# Patient Record
Sex: FEMALE | Race: BLACK OR AFRICAN AMERICAN | Employment: UNEMPLOYED | URBAN - METROPOLITAN AREA
[De-identification: names, ages, dates, MRNs, and addresses within clinical notes are randomized per-mention and may not be internally consistent; named-entity substitution may affect disease eponyms.]

---

## 2019-10-14 ENCOUNTER — HOSPITAL ENCOUNTER (EMERGENCY)
Age: 59
Discharge: HOME | End: 2019-10-14
Payer: COMMERCIAL

## 2019-10-14 VITALS
OXYGEN SATURATION: 98 % | BODY MASS INDEX: 22.18 KG/M2 | WEIGHT: 138 LBS | HEART RATE: 93 BPM | HEIGHT: 66 IN | RESPIRATION RATE: 16 BRPM | DIASTOLIC BLOOD PRESSURE: 88 MMHG | SYSTOLIC BLOOD PRESSURE: 122 MMHG | TEMPERATURE: 98.2 F

## 2019-10-14 DIAGNOSIS — N30.00 ACUTE CYSTITIS WITHOUT HEMATURIA: Primary | ICD-10-CM

## 2019-10-14 LAB
BACTERIA: ABNORMAL /HPF
BILIRUBIN URINE: NEGATIVE
BLOOD, URINE: NEGATIVE
CLARITY: CLEAR
COLOR: YELLOW
EPITHELIAL CELLS, UA: ABNORMAL /HPF
GLUCOSE URINE: NEGATIVE MG/DL
KETONES, URINE: NEGATIVE MG/DL
LEUKOCYTE ESTERASE, URINE: ABNORMAL
NITRITE, URINE: NEGATIVE
PH UA: 6.5 (ref 5–9)
PROTEIN UA: NEGATIVE MG/DL
RBC UA: ABNORMAL /HPF (ref 0–2)
SPECIFIC GRAVITY UA: <=1.005 (ref 1–1.03)
UROBILINOGEN, URINE: 0.2 E.U./DL
WBC UA: ABNORMAL /HPF (ref 0–5)

## 2019-10-14 PROCEDURE — 99283 EMERGENCY DEPT VISIT LOW MDM: CPT

## 2019-10-14 PROCEDURE — 81001 URINALYSIS AUTO W/SCOPE: CPT

## 2019-10-14 RX ORDER — NITROFURANTOIN 25; 75 MG/1; MG/1
100 CAPSULE ORAL 2 TIMES DAILY
Qty: 14 CAPSULE | Refills: 0 | Status: SHIPPED | OUTPATIENT
Start: 2019-10-14 | End: 2019-10-21

## 2023-05-09 ENCOUNTER — APPOINTMENT (OUTPATIENT)
Dept: LAB | Facility: LAB | Age: 63
End: 2023-05-09
Payer: COMMERCIAL

## 2023-05-09 LAB
ACTIVATED PARTIAL THROMBOPLASTIN TIME IN PPP BY COAGULATION ASSAY: 34 SEC (ref 26–39)
ALANINE AMINOTRANSFERASE (SGPT) (U/L) IN SER/PLAS: 39 U/L (ref 7–45)
ALBUMIN (G/DL) IN SER/PLAS: 3.2 G/DL (ref 3.4–5)
ALKALINE PHOSPHATASE (U/L) IN SER/PLAS: 183 U/L (ref 33–136)
ALPHA-1 FETOPROTEIN (NG/ML) IN SER/PLAS: 5 NG/ML (ref 0–9)
AMMONIA (UMOL/L) IN PLASMA: 28 UMOL/L
ANION GAP IN SER/PLAS: 10 MMOL/L (ref 10–20)
ASPARTATE AMINOTRANSFERASE (SGOT) (U/L) IN SER/PLAS: 60 U/L (ref 9–39)
BASOPHILS (10*3/UL) IN BLOOD BY AUTOMATED COUNT: 0.07 X10E9/L (ref 0–0.1)
BASOPHILS/100 LEUKOCYTES IN BLOOD BY AUTOMATED COUNT: 1.5 % (ref 0–2)
BILIRUBIN TOTAL (MG/DL) IN SER/PLAS: 2.1 MG/DL (ref 0–1.2)
CALCIUM (MG/DL) IN SER/PLAS: 8.6 MG/DL (ref 8.6–10.6)
CARBON DIOXIDE, TOTAL (MMOL/L) IN SER/PLAS: 27 MMOL/L (ref 21–32)
CHLORIDE (MMOL/L) IN SER/PLAS: 106 MMOL/L (ref 98–107)
CHOLESTEROL (MG/DL) IN SER/PLAS: 234 MG/DL (ref 0–199)
CHOLESTEROL IN HDL (MG/DL) IN SER/PLAS: 48.2 MG/DL
CHOLESTEROL/HDL RATIO: 4.9
CREATININE (MG/DL) IN SER/PLAS: 0.7 MG/DL (ref 0.5–1.05)
EOSINOPHILS (10*3/UL) IN BLOOD BY AUTOMATED COUNT: 0.12 X10E9/L (ref 0–0.7)
EOSINOPHILS/100 LEUKOCYTES IN BLOOD BY AUTOMATED COUNT: 2.5 % (ref 0–6)
ERYTHROCYTE DISTRIBUTION WIDTH (RATIO) BY AUTOMATED COUNT: 17.1 % (ref 11.5–14.5)
ERYTHROCYTE MEAN CORPUSCULAR HEMOGLOBIN CONCENTRATION (G/DL) BY AUTOMATED: 30.9 G/DL (ref 32–36)
ERYTHROCYTE MEAN CORPUSCULAR VOLUME (FL) BY AUTOMATED COUNT: 88 FL (ref 80–100)
ERYTHROCYTES (10*6/UL) IN BLOOD BY AUTOMATED COUNT: 4.09 X10E12/L (ref 4–5.2)
ESTIMATED AVERAGE GLUCOSE FOR HBA1C: 103 MG/DL
GFR FEMALE: >90 ML/MIN/1.73M2
GLUCOSE (MG/DL) IN SER/PLAS: 93 MG/DL (ref 74–99)
HEMATOCRIT (%) IN BLOOD BY AUTOMATED COUNT: 35.9 % (ref 36–46)
HEMOGLOBIN (G/DL) IN BLOOD: 11.1 G/DL (ref 12–16)
HEMOGLOBIN A1C/HEMOGLOBIN TOTAL IN BLOOD: 5.2 %
IMMATURE GRANULOCYTES/100 LEUKOCYTES IN BLOOD BY AUTOMATED COUNT: 0.2 % (ref 0–0.9)
INR IN PPP BY COAGULATION ASSAY: 1.2 (ref 0.9–1.1)
LDL: 161 MG/DL (ref 0–99)
LEUKOCYTES (10*3/UL) IN BLOOD BY AUTOMATED COUNT: 4.8 X10E9/L (ref 4.4–11.3)
LYMPHOCYTES (10*3/UL) IN BLOOD BY AUTOMATED COUNT: 1.43 X10E9/L (ref 1.2–4.8)
LYMPHOCYTES/100 LEUKOCYTES IN BLOOD BY AUTOMATED COUNT: 30.1 % (ref 13–44)
MONOCYTES (10*3/UL) IN BLOOD BY AUTOMATED COUNT: 0.9 X10E9/L (ref 0.1–1)
MONOCYTES/100 LEUKOCYTES IN BLOOD BY AUTOMATED COUNT: 18.9 % (ref 2–10)
NEUTROPHILS (10*3/UL) IN BLOOD BY AUTOMATED COUNT: 2.22 X10E9/L (ref 1.2–7.7)
NEUTROPHILS/100 LEUKOCYTES IN BLOOD BY AUTOMATED COUNT: 46.8 % (ref 40–80)
NRBC (PER 100 WBCS) BY AUTOMATED COUNT: 0 /100 WBC (ref 0–0)
PLATELETS (10*3/UL) IN BLOOD AUTOMATED COUNT: 90 X10E9/L (ref 150–450)
POTASSIUM (MMOL/L) IN SER/PLAS: 4 MMOL/L (ref 3.5–5.3)
PROTEIN TOTAL: 6.9 G/DL (ref 6.4–8.2)
PROTHROMBIN TIME (PT) IN PPP BY COAGULATION ASSAY: 14.4 SEC (ref 9.8–13.4)
SODIUM (MMOL/L) IN SER/PLAS: 139 MMOL/L (ref 136–145)
TRIGLYCERIDE (MG/DL) IN SER/PLAS: 122 MG/DL (ref 0–149)
UREA NITROGEN (MG/DL) IN SER/PLAS: 8 MG/DL (ref 6–23)
VLDL: 24 MG/DL (ref 0–40)

## 2023-05-10 LAB
HCV PCR QUANT: NOT DETECTED IU/ML
HCV RNA, PCR LOG: NORMAL LOG10 IU/ML

## 2023-06-07 LAB
ALANINE AMINOTRANSFERASE (SGPT) (U/L) IN SER/PLAS: 26 U/L (ref 7–45)
ALBUMIN (G/DL) IN SER/PLAS: 2.8 G/DL (ref 3.4–5)
ALKALINE PHOSPHATASE (U/L) IN SER/PLAS: 168 U/L (ref 33–136)
ALPHA 1 ANTITRYPSIN (MG/DL) IN SER/PLAS: 152 MG/DL (ref 84–218)
ANION GAP IN SER/PLAS: 10 MMOL/L (ref 10–20)
ASPARTATE AMINOTRANSFERASE (SGOT) (U/L) IN SER/PLAS: 51 U/L (ref 9–39)
BASOPHILS (10*3/UL) IN BLOOD BY AUTOMATED COUNT: 0.07 X10E9/L (ref 0–0.1)
BASOPHILS/100 LEUKOCYTES IN BLOOD BY AUTOMATED COUNT: 1.3 % (ref 0–2)
BILIRUBIN TOTAL (MG/DL) IN SER/PLAS: 2.1 MG/DL (ref 0–1.2)
CALCIUM (MG/DL) IN SER/PLAS: 8.1 MG/DL (ref 8.6–10.6)
CARBON DIOXIDE, TOTAL (MMOL/L) IN SER/PLAS: 25 MMOL/L (ref 21–32)
CERULOPLASMIN (MG/DL) IN SER/PLAS: 32 MG/DL (ref 20–60)
CHLORIDE (MMOL/L) IN SER/PLAS: 108 MMOL/L (ref 98–107)
CHOLESTEROL (MG/DL) IN SER/PLAS: 202 MG/DL (ref 0–199)
CHOLESTEROL IN HDL (MG/DL) IN SER/PLAS: 38.9 MG/DL
CHOLESTEROL/HDL RATIO: 5.2
CREATININE (MG/DL) IN SER/PLAS: 0.7 MG/DL (ref 0.5–1.05)
EOSINOPHILS (10*3/UL) IN BLOOD BY AUTOMATED COUNT: 0.12 X10E9/L (ref 0–0.7)
EOSINOPHILS/100 LEUKOCYTES IN BLOOD BY AUTOMATED COUNT: 2.1 % (ref 0–6)
ERYTHROCYTE DISTRIBUTION WIDTH (RATIO) BY AUTOMATED COUNT: 16.5 % (ref 11.5–14.5)
ERYTHROCYTE MEAN CORPUSCULAR HEMOGLOBIN CONCENTRATION (G/DL) BY AUTOMATED: 32.8 G/DL (ref 32–36)
ERYTHROCYTE MEAN CORPUSCULAR VOLUME (FL) BY AUTOMATED COUNT: 84 FL (ref 80–100)
ERYTHROCYTES (10*6/UL) IN BLOOD BY AUTOMATED COUNT: 3.48 X10E12/L (ref 4–5.2)
FERRITIN (UG/LL) IN SER/PLAS: 164 UG/L (ref 8–150)
GFR FEMALE: >90 ML/MIN/1.73M2
GLUCOSE (MG/DL) IN SER/PLAS: 102 MG/DL (ref 74–99)
HEMATOCRIT (%) IN BLOOD BY AUTOMATED COUNT: 29.3 % (ref 36–46)
HEMOGLOBIN (G/DL) IN BLOOD: 9.6 G/DL (ref 12–16)
HEPATITIS B VIRUS SURFACE AB (MIU/ML) IN SERUM: 5.9 MIU/ML
HEPATITIS B VIRUS SURFACE AG PRESENCE IN SERUM: NONREACTIVE
IMMATURE GRANULOCYTES/100 LEUKOCYTES IN BLOOD BY AUTOMATED COUNT: 0.2 % (ref 0–0.9)
IRON (UG/DL) IN SER/PLAS: 84 UG/DL (ref 35–150)
IRON BINDING CAPACITY (UG/DL) IN SER/PLAS: 323 UG/DL (ref 240–445)
IRON SATURATION (%) IN SER/PLAS: 26 % (ref 25–45)
LDL: 134 MG/DL (ref 0–99)
LEUKOCYTES (10*3/UL) IN BLOOD BY AUTOMATED COUNT: 5.6 X10E9/L (ref 4.4–11.3)
LYMPHOCYTES (10*3/UL) IN BLOOD BY AUTOMATED COUNT: 1.57 X10E9/L (ref 1.2–4.8)
LYMPHOCYTES/100 LEUKOCYTES IN BLOOD BY AUTOMATED COUNT: 28.1 % (ref 13–44)
MONOCYTES (10*3/UL) IN BLOOD BY AUTOMATED COUNT: 1.37 X10E9/L (ref 0.1–1)
MONOCYTES/100 LEUKOCYTES IN BLOOD BY AUTOMATED COUNT: 24.5 % (ref 2–10)
NEUTROPHILS (10*3/UL) IN BLOOD BY AUTOMATED COUNT: 2.45 X10E9/L (ref 1.2–7.7)
NEUTROPHILS/100 LEUKOCYTES IN BLOOD BY AUTOMATED COUNT: 43.8 % (ref 40–80)
NRBC (PER 100 WBCS) BY AUTOMATED COUNT: 0 /100 WBC (ref 0–0)
PLATELETS (10*3/UL) IN BLOOD AUTOMATED COUNT: 80 X10E9/L (ref 150–450)
POTASSIUM (MMOL/L) IN SER/PLAS: 3.5 MMOL/L (ref 3.5–5.3)
PROTEIN TOTAL: 6.1 G/DL (ref 6.4–8.2)
SODIUM (MMOL/L) IN SER/PLAS: 139 MMOL/L (ref 136–145)
TRIGLYCERIDE (MG/DL) IN SER/PLAS: 148 MG/DL (ref 0–149)
UREA NITROGEN (MG/DL) IN SER/PLAS: 8 MG/DL (ref 6–23)
VLDL: 30 MG/DL (ref 0–40)

## 2023-06-08 LAB
ANA PATTERN: ABNORMAL
ANA TITER: ABNORMAL
ANTI-CENTROMERE: <0.2 AI
ANTI-CHROMATIN: <0.2 AI
ANTI-DNA (DS): <1 IU/ML
ANTI-JO-1 IGG: <0.2 AI
ANTI-NUCLEAR ANTIBODY (ANA): POSITIVE
ANTI-RIBOSOMAL P: <0.2 AI
ANTI-RNP: 0.2 AI
ANTI-SCL-70: <0.2 AI
ANTI-SM/RNP: <0.2 AI
ANTI-SM: <0.2 AI
ANTI-SMOOTH MUSCLE ANTIBODY: NEGATIVE
ANTI-SSA: <0.2 AI
ANTI-SSB: <0.2 AI
MITOCHONDRIAL ANTIBODY: NEGATIVE

## 2023-06-14 ENCOUNTER — HOSPITAL ENCOUNTER (OUTPATIENT)
Dept: DATA CONVERSION | Facility: HOSPITAL | Age: 63
End: 2023-06-14
Attending: RADIOLOGY | Admitting: RADIOLOGY
Payer: COMMERCIAL

## 2023-06-14 DIAGNOSIS — K70.31 ALCOHOLIC CIRRHOSIS OF LIVER WITH ASCITES (MULTI): ICD-10-CM

## 2023-06-14 DIAGNOSIS — K70.30 ALCOHOLIC CIRRHOSIS OF LIVER WITHOUT ASCITES (MULTI): ICD-10-CM

## 2023-06-14 DIAGNOSIS — R18.8 OTHER ASCITES: ICD-10-CM

## 2023-06-14 DIAGNOSIS — R14.0 ABDOMINAL DISTENSION (GASEOUS): ICD-10-CM

## 2023-06-14 DIAGNOSIS — R10.9 UNSPECIFIED ABDOMINAL PAIN: ICD-10-CM

## 2023-06-14 DIAGNOSIS — E78.5 HYPERLIPIDEMIA, UNSPECIFIED: ICD-10-CM

## 2023-06-15 ENCOUNTER — PATIENT OUTREACH (OUTPATIENT)
Dept: CARE COORDINATION | Facility: CLINIC | Age: 63
End: 2023-06-15
Payer: COMMERCIAL

## 2023-06-15 ENCOUNTER — DOCUMENTATION (OUTPATIENT)
Dept: CARE COORDINATION | Facility: CLINIC | Age: 63
End: 2023-06-15
Payer: COMMERCIAL

## 2023-06-15 NOTE — PROGRESS NOTES
" attempted to call patient for initial outreach. SW introduced self and patient responded \"I don't feel like talking.\" SW asked patient if she would be willing to talk for a few minutes and she again said \"I don't feel like talking.\" SW asked if there would be a better time to call back. Patient then hung up and ended the call.    "

## 2023-06-15 NOTE — PROGRESS NOTES
Referral received for care management services. Will outreach to patient to assess needs and provide support.

## 2023-06-16 NOTE — PROGRESS NOTES
"SW attempted to call patient's daughters Eli Moreno and Milana Foster. Purpose of outreach attempts to these family members was to assess any safety concerns they have for patient, as SW was unable to engage patient on the phone. SW was unable to reach Eli due to the number in the chart being incorrect. Left voicemail for Milana requesting callback.     MATHEW contacted [facility] team to refer patient for outreach and possibly safety check. Spoke with  Malia. She obtained patient's information and stated the Mobile Crisis team would reach out to patient on the phone. She also said that if patient refused to talk or if she did not answer, the team would complete a safety check.    While MATHEW was on the phone with [facility], patient's daughter Milana called back. Milana told MATHEW that patient is \"doing good.\" Milana said she did not have any concerns about patient or her safety. Milana confirmed that patient lives with her, so she has seen and talked to patient throughout the day since her procedure on Wednesday. MATHEW provided Milana with the phone number for Mobile [facility] and advised her to call if patient begins to have new or worsening depression symptoms or thoughts of harming herself or others.    SW called [facility] back and provided update about conversation with patient's daughter Milana. They updated patient's chart and said they planned to still complete an outreach call at the very least.   "

## 2023-09-07 ENCOUNTER — HOSPITAL ENCOUNTER (OUTPATIENT)
Dept: DATA CONVERSION | Facility: HOSPITAL | Age: 63
End: 2023-09-07
Attending: NURSE PRACTITIONER
Payer: COMMERCIAL

## 2023-09-07 VITALS — BODY MASS INDEX: 26.68 KG/M2 | HEIGHT: 66 IN | WEIGHT: 166.01 LBS

## 2023-09-07 DIAGNOSIS — K70.30 ALCOHOLIC CIRRHOSIS OF LIVER WITHOUT ASCITES (MULTI): ICD-10-CM

## 2023-09-07 DIAGNOSIS — R18.8 OTHER ASCITES: ICD-10-CM

## 2023-09-07 DIAGNOSIS — K70.31 ALCOHOLIC CIRRHOSIS OF LIVER WITH ASCITES (MULTI): ICD-10-CM

## 2023-09-28 ENCOUNTER — HOSPITAL ENCOUNTER (OUTPATIENT)
Dept: DATA CONVERSION | Facility: HOSPITAL | Age: 63
End: 2023-09-28
Attending: NURSE PRACTITIONER | Admitting: NURSE PRACTITIONER
Payer: COMMERCIAL

## 2023-09-28 DIAGNOSIS — K70.30 ALCOHOLIC CIRRHOSIS OF LIVER WITHOUT ASCITES (MULTI): ICD-10-CM

## 2023-09-28 DIAGNOSIS — K70.31 ALCOHOLIC CIRRHOSIS OF LIVER WITH ASCITES (MULTI): ICD-10-CM

## 2023-09-28 DIAGNOSIS — F10.90 ALCOHOL USE, UNSPECIFIED, UNCOMPLICATED: ICD-10-CM

## 2023-10-05 PROBLEM — F32.0 DEPRESSION, MAJOR, SINGLE EPISODE, MILD (CMS-HCC): Status: ACTIVE | Noted: 2023-10-05

## 2023-10-05 PROBLEM — R74.01 TRANSAMINITIS: Status: ACTIVE | Noted: 2023-10-05

## 2023-10-05 PROBLEM — K70.30 ALCOHOLIC CIRRHOSIS (MULTI): Status: ACTIVE | Noted: 2023-10-05

## 2023-10-05 PROBLEM — R06.02 SHORTNESS OF BREATH ON EXERTION: Status: ACTIVE | Noted: 2023-10-05

## 2023-10-05 PROBLEM — R18.8 ABDOMINAL ASCITES: Status: ACTIVE | Noted: 2023-10-05

## 2023-10-05 PROBLEM — G89.29 CHRONIC PAIN OF RIGHT ANKLE: Status: ACTIVE | Noted: 2023-10-05

## 2023-10-05 PROBLEM — F14.90 COCAINE USE: Status: ACTIVE | Noted: 2023-10-05

## 2023-10-05 PROBLEM — R78.81 POSITIVE BLOOD CULTURES: Status: ACTIVE | Noted: 2023-10-05

## 2023-10-05 PROBLEM — R00.0 TACHYCARDIA: Status: ACTIVE | Noted: 2023-10-05

## 2023-10-05 PROBLEM — I85.00 ESOPHAGEAL VARICES (MULTI): Status: ACTIVE | Noted: 2023-10-05

## 2023-10-05 PROBLEM — R78.81 BACTEREMIA: Status: ACTIVE | Noted: 2023-10-05

## 2023-10-05 PROBLEM — B18.2 CHRONIC HEPATITIS C WITH CIRRHOSIS (MULTI): Status: ACTIVE | Noted: 2023-10-05

## 2023-10-05 PROBLEM — K74.60 CHRONIC HEPATITIS C WITH CIRRHOSIS (MULTI): Status: ACTIVE | Noted: 2023-10-05

## 2023-10-05 PROBLEM — N13.30 HYDRONEPHROSIS, LEFT: Status: ACTIVE | Noted: 2023-10-05

## 2023-10-05 PROBLEM — R60.0 LEG EDEMA: Status: ACTIVE | Noted: 2023-10-05

## 2023-10-05 PROBLEM — F31.81: Status: ACTIVE | Noted: 2023-10-05

## 2023-10-05 PROBLEM — L30.9 ECZEMA: Status: ACTIVE | Noted: 2023-10-05

## 2023-10-05 PROBLEM — K70.9 ALCOHOLIC LIVER DISEASE (MULTI): Status: ACTIVE | Noted: 2023-10-05

## 2023-10-05 PROBLEM — G31.84 MILD COGNITIVE IMPAIRMENT: Status: ACTIVE | Noted: 2023-10-05

## 2023-10-05 PROBLEM — M25.571 CHRONIC PAIN OF RIGHT ANKLE: Status: ACTIVE | Noted: 2023-10-05

## 2023-10-05 PROBLEM — H61.23 BILATERAL IMPACTED CERUMEN: Status: ACTIVE | Noted: 2023-10-05

## 2023-10-05 PROBLEM — E78.5 DYSLIPIDEMIA: Status: ACTIVE | Noted: 2023-10-05

## 2023-10-05 PROBLEM — F33.0 MILD EPISODE OF RECURRENT MAJOR DEPRESSIVE DISORDER (CMS-HCC): Status: ACTIVE | Noted: 2023-10-05

## 2023-10-05 PROBLEM — F10.10 ALCOHOL ABUSE: Status: ACTIVE | Noted: 2023-10-05

## 2023-10-05 PROBLEM — F10.929: Status: ACTIVE | Noted: 2023-10-05

## 2023-10-05 PROBLEM — F22 PARANOIA (MULTI): Status: ACTIVE | Noted: 2023-10-05

## 2023-10-05 RX ORDER — ATORVASTATIN CALCIUM 20 MG/1
1 TABLET, FILM COATED ORAL NIGHTLY
COMMUNITY
Start: 2023-06-07 | End: 2024-03-07 | Stop reason: SDUPTHER

## 2023-10-05 RX ORDER — HYDROCORTISONE 1 G/100G
CREAM TOPICAL 3 TIMES DAILY
Status: ON HOLD | COMMUNITY
Start: 2022-06-07 | End: 2023-12-26 | Stop reason: ALTCHOICE

## 2023-10-05 RX ORDER — GUAIFENESIN 1200 MG
650 TABLET, EXTENDED RELEASE 12 HR ORAL 3 TIMES DAILY PRN
COMMUNITY
Start: 2022-06-06 | End: 2024-03-07

## 2023-10-05 RX ORDER — CARVEDILOL 6.25 MG/1
6.25 TABLET ORAL 2 TIMES DAILY
COMMUNITY
Start: 2023-09-08 | End: 2024-03-07 | Stop reason: SDUPTHER

## 2023-10-05 RX ORDER — CARVEDILOL 3.12 MG/1
1 TABLET ORAL 2 TIMES DAILY
COMMUNITY
Start: 2022-08-12 | End: 2023-12-23 | Stop reason: ALTCHOICE

## 2023-10-05 RX ORDER — ACETAMINOPHEN 500 MG/1
500 CAPSULE, LIQUID FILLED ORAL 3 TIMES DAILY PRN
COMMUNITY
Start: 2022-06-06 | End: 2023-12-23 | Stop reason: ALTCHOICE

## 2023-10-05 RX ORDER — FOLIC ACID 1 MG/1
1 TABLET ORAL DAILY
COMMUNITY
Start: 2023-08-30 | End: 2024-03-07 | Stop reason: SDUPTHER

## 2023-10-05 RX ORDER — FUROSEMIDE 40 MG/1
40 TABLET ORAL DAILY
COMMUNITY
Start: 2022-06-06 | End: 2024-03-07 | Stop reason: SDUPTHER

## 2023-10-05 RX ORDER — LANOLIN ALCOHOL/MO/W.PET/CERES
100 CREAM (GRAM) TOPICAL DAILY
COMMUNITY
Start: 2023-08-30 | End: 2024-03-07 | Stop reason: SDUPTHER

## 2023-10-05 RX ORDER — ASPIRIN 81 MG
TABLET, DELAYED RELEASE (ENTERIC COATED) ORAL
COMMUNITY
Start: 2022-11-14 | End: 2023-12-23 | Stop reason: ALTCHOICE

## 2023-10-05 RX ORDER — ELECTROLYTES/DEXTROSE
SOLUTION, ORAL ORAL
Status: ON HOLD | COMMUNITY
Start: 2023-03-07 | End: 2023-12-26 | Stop reason: ALTCHOICE

## 2023-10-05 RX ORDER — ARIPIPRAZOLE 5 MG/1
1 TABLET ORAL DAILY
COMMUNITY
Start: 2022-05-26 | End: 2024-03-07 | Stop reason: SDUPTHER

## 2023-10-05 RX ORDER — POLYETHYLENE GLYCOL 3350 17 G/17G
17 POWDER, FOR SOLUTION ORAL DAILY PRN
Status: ON HOLD | COMMUNITY
Start: 2023-07-05 | End: 2023-12-26 | Stop reason: ALTCHOICE

## 2023-10-05 RX ORDER — NAPROXEN 500 MG/1
500 TABLET ORAL EVERY 12 HOURS
COMMUNITY
Start: 2023-09-28 | End: 2024-03-07

## 2023-10-05 RX ORDER — SPIRONOLACTONE 50 MG/1
1 TABLET, FILM COATED ORAL DAILY
COMMUNITY
Start: 2022-06-06 | End: 2024-03-07 | Stop reason: SDUPTHER

## 2023-10-06 ENCOUNTER — TELEPHONE (OUTPATIENT)
Dept: PRIMARY CARE | Facility: HOSPITAL | Age: 63
End: 2023-10-06
Payer: COMMERCIAL

## 2023-10-06 ENCOUNTER — APPOINTMENT (OUTPATIENT)
Dept: GYNECOLOGIC ONCOLOGY | Facility: HOSPITAL | Age: 63
End: 2023-10-06
Payer: COMMERCIAL

## 2023-10-19 ENCOUNTER — HOSPITAL ENCOUNTER (OUTPATIENT)
Dept: RADIOLOGY | Facility: HOSPITAL | Age: 63
Discharge: HOME | End: 2023-10-19
Payer: COMMERCIAL

## 2023-10-19 VITALS
TEMPERATURE: 97.2 F | DIASTOLIC BLOOD PRESSURE: 70 MMHG | SYSTOLIC BLOOD PRESSURE: 111 MMHG | RESPIRATION RATE: 16 BRPM | OXYGEN SATURATION: 89 % | HEART RATE: 88 BPM

## 2023-10-19 DIAGNOSIS — K70.30 ALCOHOLIC CIRRHOSIS OF LIVER WITHOUT ASCITES (MULTI): ICD-10-CM

## 2023-10-19 DIAGNOSIS — R18.8 OTHER ASCITES: ICD-10-CM

## 2023-10-19 DIAGNOSIS — K70.31 ASCITES DUE TO ALCOHOLIC CIRRHOSIS (MULTI): Primary | ICD-10-CM

## 2023-10-19 PROCEDURE — 49083 ABD PARACENTESIS W/IMAGING: CPT | Performed by: NURSE PRACTITIONER

## 2023-10-19 PROCEDURE — 2500000004 HC RX 250 GENERAL PHARMACY W/ HCPCS (ALT 636 FOR OP/ED): Mod: JZ,SE | Performed by: NURSE PRACTITIONER

## 2023-10-19 PROCEDURE — P9045 ALBUMIN (HUMAN), 5%, 250 ML: HCPCS | Mod: JZ,SE | Performed by: NURSE PRACTITIONER

## 2023-10-19 PROCEDURE — 49083 ABD PARACENTESIS W/IMAGING: CPT

## 2023-10-19 RX ORDER — ALBUMIN HUMAN 50 G/1000ML
50 SOLUTION INTRAVENOUS ONCE
Status: COMPLETED | OUTPATIENT
Start: 2023-10-19 | End: 2023-10-19

## 2023-10-19 RX ADMIN — ALBUMIN (HUMAN) 50 G: 12.5 SOLUTION INTRAVENOUS at 10:30

## 2023-10-19 ASSESSMENT — PAIN - FUNCTIONAL ASSESSMENT
PAIN_FUNCTIONAL_ASSESSMENT: 0-10

## 2023-10-19 ASSESSMENT — ENCOUNTER SYMPTOMS
ABDOMINAL DISTENTION: 1
MUSCULOSKELETAL NEGATIVE: 1
CONSTITUTIONAL NEGATIVE: 1
PSYCHIATRIC NEGATIVE: 1
NEUROLOGICAL NEGATIVE: 1
CARDIOVASCULAR NEGATIVE: 1
ABDOMINAL PAIN: 1
RESPIRATORY NEGATIVE: 1

## 2023-10-19 ASSESSMENT — PAIN SCALES - GENERAL
PAINLEVEL_OUTOF10: 0 - NO PAIN

## 2023-10-19 NOTE — POST-PROCEDURE NOTE
A time out was performed and Left Hemiabdomen was examined with US and appropriate entry point was confirmed and marked.  The patient was prepped and draped in a sterile manner, 1% lidocaine was used to anesthesize the skin and subcutaneous tissue. A 5F Centesis needle was then introduced through the skin into the peritoneal space, the centesis catheter was then threaded without difficulty. 6800 ml of yellow fluid was removed without difficulty. The catheter was then removed. No immediate complications were noted during and immediately following the procedure.

## 2023-10-19 NOTE — H&P (VIEW-ONLY)
History Of Present Illness  Yolie Moreno is a 63 y.o. female presenting with ascites.     Past Medical History  Past Medical History:   Diagnosis Date    Encounter for gynecological examination (general) (routine) without abnormal findings 07/20/2022    Well woman exam    Trichomonal vulvovaginitis 07/30/2022    Trichomonas vaginitis       Surgical History  Past Surgical History:   Procedure Laterality Date    CHOLECYSTECTOMY  11/12/2014    Cholecystectomy    US GUIDED ABDOMINAL PARACENTESIS  6/14/2023    US GUIDED ABDOMINAL PARACENTESIS 6/14/2023 [facility]    US GUIDED ABDOMINAL PARACENTESIS  7/3/2023    US GUIDED ABDOMINAL PARACENTESIS 7/3/2023 [facility]    US GUIDED ABDOMINAL PARACENTESIS  9/7/2023    US GUIDED ABDOMINAL PARACENTESIS 9/7/2023 [facility]    US GUIDED ABDOMINAL PARACENTESIS  9/28/2023    US GUIDED ABDOMINAL PARACENTESIS 9/28/2023 [facility]        Social History  She has no history on file for tobacco use, alcohol use, and drug use.    Family History  No family history on file.     Allergies  Patient has no known allergies.    Review of Systems   Constitutional: Negative.    HENT: Negative.     Respiratory: Negative.     Cardiovascular: Negative.    Gastrointestinal:  Positive for abdominal distention and abdominal pain.   Genitourinary: Negative.    Musculoskeletal: Negative.    Skin: Negative.    Neurological: Negative.    Psychiatric/Behavioral: Negative.          Physical Exam  Constitutional:       Appearance: Normal appearance. She is normal weight.   Eyes:      Pupils: Pupils are equal, round, and reactive to light.   Cardiovascular:      Rate and Rhythm: Normal rate and regular rhythm.   Pulmonary:      Effort: Pulmonary effort is normal.   Abdominal:      General: Bowel sounds are normal. There is distension.      Tenderness: There is abdominal tenderness.   Musculoskeletal:         General: Normal range of motion.      Cervical back: Normal range of motion.   Skin:     General: Skin is warm and  dry.   Neurological:      Mental Status: She is alert and oriented to person, place, and time.   Psychiatric:         Mood and Affect: Mood normal.         Behavior: Behavior normal.         Thought Content: Thought content normal.         Judgment: Judgment normal.          Last Recorded Vitals  Blood pressure 104/57, pulse 90, temperature 36.6 °C (97.9 °F), temperature source Temporal, resp. rate 18, SpO2 90 %.    Relevant Results        Most recent labwork within safe range for procedure.     Assessment/Plan   Active Problems:  There are no active Hospital Problems.      Paracentesis.       I spent 30 minutes in the professional and overall care of this patient.      Carlos Chan, APRN-CNP

## 2023-10-19 NOTE — H&P
History Of Present Illness  Yolie Moreno is a 63 y.o. female presenting with ascites.     Past Medical History  Past Medical History:   Diagnosis Date    Encounter for gynecological examination (general) (routine) without abnormal findings 07/20/2022    Well woman exam    Trichomonal vulvovaginitis 07/30/2022    Trichomonas vaginitis       Surgical History  Past Surgical History:   Procedure Laterality Date    CHOLECYSTECTOMY  11/12/2014    Cholecystectomy    US GUIDED ABDOMINAL PARACENTESIS  6/14/2023    US GUIDED ABDOMINAL PARACENTESIS 6/14/2023 [facility]    US GUIDED ABDOMINAL PARACENTESIS  7/3/2023    US GUIDED ABDOMINAL PARACENTESIS 7/3/2023 [facility]    US GUIDED ABDOMINAL PARACENTESIS  9/7/2023    US GUIDED ABDOMINAL PARACENTESIS 9/7/2023 [facility]    US GUIDED ABDOMINAL PARACENTESIS  9/28/2023    US GUIDED ABDOMINAL PARACENTESIS 9/28/2023 [facility]        Social History  She has no history on file for tobacco use, alcohol use, and drug use.    Family History  No family history on file.     Allergies  Patient has no known allergies.    Review of Systems   Constitutional: Negative.    HENT: Negative.     Respiratory: Negative.     Cardiovascular: Negative.    Gastrointestinal:  Positive for abdominal distention and abdominal pain.   Genitourinary: Negative.    Musculoskeletal: Negative.    Skin: Negative.    Neurological: Negative.    Psychiatric/Behavioral: Negative.          Physical Exam  Constitutional:       Appearance: Normal appearance. She is normal weight.   Eyes:      Pupils: Pupils are equal, round, and reactive to light.   Cardiovascular:      Rate and Rhythm: Normal rate and regular rhythm.   Pulmonary:      Effort: Pulmonary effort is normal.   Abdominal:      General: Bowel sounds are normal. There is distension.      Tenderness: There is abdominal tenderness.   Musculoskeletal:         General: Normal range of motion.      Cervical back: Normal range of motion.   Skin:     General: Skin is warm and  dry.   Neurological:      Mental Status: She is alert and oriented to person, place, and time.   Psychiatric:         Mood and Affect: Mood normal.         Behavior: Behavior normal.         Thought Content: Thought content normal.         Judgment: Judgment normal.          Last Recorded Vitals  Blood pressure 104/57, pulse 90, temperature 36.6 °C (97.9 °F), temperature source Temporal, resp. rate 18, SpO2 90 %.    Relevant Results        Most recent labwork within safe range for procedure.     Assessment/Plan   Active Problems:  There are no active Hospital Problems.      Paracentesis.       I spent 30 minutes in the professional and overall care of this patient.      Carlos Chan, APRN-CNP

## 2023-10-29 PROBLEM — M25.571 CHRONIC PAIN OF RIGHT ANKLE: Status: ACTIVE | Noted: 2023-10-29

## 2023-10-29 PROBLEM — F31.81: Status: ACTIVE | Noted: 2023-10-29

## 2023-10-29 PROBLEM — K70.30 ALCOHOLIC CIRRHOSIS OF LIVER (MULTI): Status: ACTIVE | Noted: 2023-10-29

## 2023-10-29 PROBLEM — N13.30 HYDRONEPHROSIS, LEFT: Status: ACTIVE | Noted: 2023-10-29

## 2023-10-29 PROBLEM — I85.00 ESOPHAGEAL VARICES (MULTI): Status: ACTIVE | Noted: 2023-10-29

## 2023-10-29 PROBLEM — G89.29 CHRONIC PAIN OF RIGHT ANKLE: Status: ACTIVE | Noted: 2023-10-29

## 2023-10-29 PROBLEM — G31.84 MILD COGNITIVE IMPAIRMENT: Status: ACTIVE | Noted: 2023-10-29

## 2023-10-29 PROBLEM — R00.0 TACHYCARDIA: Status: ACTIVE | Noted: 2023-10-29

## 2023-10-29 PROBLEM — F10.90 ALCOHOL USE DISORDER: Status: ACTIVE | Noted: 2023-10-29

## 2023-10-29 PROBLEM — R06.02 SOB (SHORTNESS OF BREATH) ON EXERTION: Status: ACTIVE | Noted: 2023-10-29

## 2023-10-29 PROBLEM — R18.8 ASCITES: Status: ACTIVE | Noted: 2023-10-29

## 2023-10-29 PROBLEM — F33.0 MILD EPISODE OF RECURRENT MAJOR DEPRESSIVE DISORDER (CMS-HCC): Status: ACTIVE | Noted: 2023-10-29

## 2023-10-29 PROBLEM — K74.60 CHRONIC HEPATITIS C WITH CIRRHOSIS (MULTI): Status: ACTIVE | Noted: 2023-10-29

## 2023-10-29 PROBLEM — B18.2 CHRONIC HEPATITIS C WITH CIRRHOSIS (MULTI): Status: ACTIVE | Noted: 2023-10-29

## 2023-10-29 PROBLEM — L30.9 ECZEMA: Status: ACTIVE | Noted: 2023-10-29

## 2023-10-29 PROBLEM — F10.10 ALCOHOL ABUSE: Status: ACTIVE | Noted: 2023-10-29

## 2023-10-29 PROBLEM — E78.5 DYSLIPIDEMIA: Status: ACTIVE | Noted: 2023-10-29

## 2023-10-30 ENCOUNTER — TELEPHONE (OUTPATIENT)
Dept: PRIMARY CARE | Facility: HOSPITAL | Age: 63
End: 2023-10-30
Payer: COMMERCIAL

## 2023-10-31 DIAGNOSIS — Z59.41 FOOD INSECURITY: Primary | ICD-10-CM

## 2023-10-31 SDOH — ECONOMIC STABILITY: FOOD INSECURITY: WITHIN THE PAST 12 MONTHS, THE FOOD YOU BOUGHT JUST DIDN'T LAST AND YOU DIDN'T HAVE MONEY TO GET MORE.: OFTEN TRUE

## 2023-10-31 SDOH — ECONOMIC STABILITY: FOOD INSECURITY: WITHIN THE PAST 12 MONTHS, YOU WORRIED THAT YOUR FOOD WOULD RUN OUT BEFORE YOU GOT MONEY TO BUY MORE.: OFTEN TRUE

## 2023-10-31 SDOH — ECONOMIC STABILITY - FOOD INSECURITY: FOOD INSECURITY: Z59.41

## 2023-11-07 ENCOUNTER — APPOINTMENT (OUTPATIENT)
Dept: NUTRITION | Facility: HOSPITAL | Age: 63
End: 2023-11-07
Payer: COMMERCIAL

## 2023-11-07 ENCOUNTER — APPOINTMENT (OUTPATIENT)
Dept: PRIMARY CARE | Facility: HOSPITAL | Age: 63
End: 2023-11-07
Payer: COMMERCIAL

## 2023-11-08 ENCOUNTER — HOSPITAL ENCOUNTER (EMERGENCY)
Facility: HOSPITAL | Age: 63
Discharge: HOME | End: 2023-11-08
Attending: EMERGENCY MEDICINE
Payer: COMMERCIAL

## 2023-11-08 ENCOUNTER — DOCUMENTATION (OUTPATIENT)
Dept: PRIMARY CARE | Facility: HOSPITAL | Age: 63
End: 2023-11-08

## 2023-11-08 VITALS
RESPIRATION RATE: 18 BRPM | SYSTOLIC BLOOD PRESSURE: 110 MMHG | DIASTOLIC BLOOD PRESSURE: 66 MMHG | HEART RATE: 118 BPM | TEMPERATURE: 99.1 F | OXYGEN SATURATION: 97 %

## 2023-11-08 DIAGNOSIS — R60.0 LOCALIZED EDEMA: Primary | ICD-10-CM

## 2023-11-08 LAB
ALBUMIN SERPL BCP-MCNC: 2.8 G/DL (ref 3.4–5)
ALP SERPL-CCNC: 201 U/L (ref 33–136)
ALT SERPL W P-5'-P-CCNC: 42 U/L (ref 7–45)
AMMONIA PLAS-SCNC: 54 UMOL/L (ref 16–53)
ANION GAP SERPL CALC-SCNC: 9 MMOL/L (ref 10–20)
APTT PPP: 27 SECONDS (ref 27–38)
AST SERPL W P-5'-P-CCNC: 102 U/L (ref 9–39)
BASOPHILS # BLD AUTO: 0.05 X10*3/UL (ref 0–0.1)
BASOPHILS NFR BLD AUTO: 1 %
BILIRUB DIRECT SERPL-MCNC: 0.2 MG/DL (ref 0–0.3)
BILIRUB SERPL-MCNC: 1.2 MG/DL (ref 0–1.2)
BNP SERPL-MCNC: 10 PG/ML (ref 0–99)
BUN SERPL-MCNC: 5 MG/DL (ref 6–23)
CALCIUM SERPL-MCNC: 8.1 MG/DL (ref 8.6–10.6)
CARDIAC TROPONIN I PNL SERPL HS: 3 NG/L (ref 0–34)
CARDIAC TROPONIN I PNL SERPL HS: 3 NG/L (ref 0–34)
CHLORIDE SERPL-SCNC: 110 MMOL/L (ref 98–107)
CO2 SERPL-SCNC: 26 MMOL/L (ref 21–32)
CREAT SERPL-MCNC: 0.63 MG/DL (ref 0.5–1.05)
EOSINOPHIL # BLD AUTO: 0.24 X10*3/UL (ref 0–0.7)
EOSINOPHIL NFR BLD AUTO: 4.7 %
ERYTHROCYTE [DISTWIDTH] IN BLOOD BY AUTOMATED COUNT: 16.9 % (ref 11.5–14.5)
GFR SERPL CREATININE-BSD FRML MDRD: >90 ML/MIN/1.73M*2
GLUCOSE SERPL-MCNC: 94 MG/DL (ref 74–99)
HCT VFR BLD AUTO: 33.6 % (ref 36–46)
HGB BLD-MCNC: 10.6 G/DL (ref 12–16)
IMM GRANULOCYTES # BLD AUTO: 0.02 X10*3/UL (ref 0–0.7)
IMM GRANULOCYTES NFR BLD AUTO: 0.4 % (ref 0–0.9)
INR PPP: 1.2 (ref 0.9–1.1)
LYMPHOCYTES # BLD AUTO: 1.39 X10*3/UL (ref 1.2–4.8)
LYMPHOCYTES NFR BLD AUTO: 27.1 %
MCH RBC QN AUTO: 26.4 PG (ref 26–34)
MCHC RBC AUTO-ENTMCNC: 31.5 G/DL (ref 32–36)
MCV RBC AUTO: 84 FL (ref 80–100)
MONOCYTES # BLD AUTO: 1.05 X10*3/UL (ref 0.1–1)
MONOCYTES NFR BLD AUTO: 20.5 %
NEUTROPHILS # BLD AUTO: 2.38 X10*3/UL (ref 1.2–7.7)
NEUTROPHILS NFR BLD AUTO: 46.3 %
NRBC BLD-RTO: 0 /100 WBCS (ref 0–0)
PLATELET # BLD AUTO: 96 X10*3/UL (ref 150–450)
POTASSIUM SERPL-SCNC: 4 MMOL/L (ref 3.5–5.3)
PROT SERPL-MCNC: 7.5 G/DL (ref 6.4–8.2)
PROTHROMBIN TIME: 13.6 SECONDS (ref 9.8–12.8)
RBC # BLD AUTO: 4.01 X10*6/UL (ref 4–5.2)
SODIUM SERPL-SCNC: 141 MMOL/L (ref 136–145)
WBC # BLD AUTO: 5.1 X10*3/UL (ref 4.4–11.3)

## 2023-11-08 PROCEDURE — 82248 BILIRUBIN DIRECT: CPT | Performed by: EMERGENCY MEDICINE

## 2023-11-08 PROCEDURE — 99285 EMERGENCY DEPT VISIT HI MDM: CPT | Mod: 25 | Performed by: EMERGENCY MEDICINE

## 2023-11-08 PROCEDURE — 99284 EMERGENCY DEPT VISIT MOD MDM: CPT | Mod: 25

## 2023-11-08 PROCEDURE — 36415 COLL VENOUS BLD VENIPUNCTURE: CPT | Performed by: EMERGENCY MEDICINE

## 2023-11-08 PROCEDURE — 84075 ASSAY ALKALINE PHOSPHATASE: CPT | Performed by: EMERGENCY MEDICINE

## 2023-11-08 PROCEDURE — 82140 ASSAY OF AMMONIA: CPT | Performed by: EMERGENCY MEDICINE

## 2023-11-08 PROCEDURE — 2500000004 HC RX 250 GENERAL PHARMACY W/ HCPCS (ALT 636 FOR OP/ED): Mod: SE | Performed by: STUDENT IN AN ORGANIZED HEALTH CARE EDUCATION/TRAINING PROGRAM

## 2023-11-08 PROCEDURE — 99285 EMERGENCY DEPT VISIT HI MDM: CPT | Performed by: EMERGENCY MEDICINE

## 2023-11-08 PROCEDURE — 84484 ASSAY OF TROPONIN QUANT: CPT | Performed by: EMERGENCY MEDICINE

## 2023-11-08 PROCEDURE — 71045 X-RAY EXAM CHEST 1 VIEW: CPT | Mod: FOREIGN READ | Performed by: RADIOLOGY

## 2023-11-08 PROCEDURE — 84484 ASSAY OF TROPONIN QUANT: CPT | Mod: 91 | Performed by: EMERGENCY MEDICINE

## 2023-11-08 PROCEDURE — 83880 ASSAY OF NATRIURETIC PEPTIDE: CPT | Performed by: EMERGENCY MEDICINE

## 2023-11-08 PROCEDURE — 85025 COMPLETE CBC W/AUTO DIFF WBC: CPT | Performed by: EMERGENCY MEDICINE

## 2023-11-08 PROCEDURE — 80053 COMPREHEN METABOLIC PANEL: CPT | Performed by: EMERGENCY MEDICINE

## 2023-11-08 PROCEDURE — 85610 PROTHROMBIN TIME: CPT | Performed by: EMERGENCY MEDICINE

## 2023-11-08 PROCEDURE — 93005 ELECTROCARDIOGRAM TRACING: CPT

## 2023-11-08 PROCEDURE — 96374 THER/PROPH/DIAG INJ IV PUSH: CPT

## 2023-11-08 RX ORDER — FUROSEMIDE 10 MG/ML
40 INJECTION INTRAMUSCULAR; INTRAVENOUS ONCE
Status: COMPLETED | OUTPATIENT
Start: 2023-11-08 | End: 2023-11-08

## 2023-11-08 RX ADMIN — FUROSEMIDE 40 MG: 10 INJECTION, SOLUTION INTRAVENOUS at 19:32

## 2023-11-08 SDOH — HEALTH STABILITY: MENTAL HEALTH: HAVE YOU WISHED YOU WERE DEAD OR WISHED YOU COULD GO TO SLEEP AND NOT WAKE UP?: NO

## 2023-11-08 SDOH — HEALTH STABILITY: MENTAL HEALTH: SUICIDE ASSESSMENT: ADULT (C-SSRS)

## 2023-11-08 SDOH — HEALTH STABILITY: MENTAL HEALTH: HAVE YOU ACTUALLY HAD ANY THOUGHTS OF KILLING YOURSELF?: NO

## 2023-11-08 SDOH — HEALTH STABILITY: MENTAL HEALTH: HAVE YOU EVER DONE ANYTHING, STARTED TO DO ANYTHING, OR PREPARED TO DO ANYTHING TO END YOUR LIFE?: NO

## 2023-11-08 ASSESSMENT — PAIN - FUNCTIONAL ASSESSMENT: PAIN_FUNCTIONAL_ASSESSMENT: 0-10

## 2023-11-08 ASSESSMENT — COLUMBIA-SUICIDE SEVERITY RATING SCALE - C-SSRS
6. HAVE YOU EVER DONE ANYTHING, STARTED TO DO ANYTHING, OR PREPARED TO DO ANYTHING TO END YOUR LIFE?: NO
1. IN THE PAST MONTH, HAVE YOU WISHED YOU WERE DEAD OR WISHED YOU COULD GO TO SLEEP AND NOT WAKE UP?: NO
2. HAVE YOU ACTUALLY HAD ANY THOUGHTS OF KILLING YOURSELF?: NO

## 2023-11-08 ASSESSMENT — LIFESTYLE VARIABLES
HAVE PEOPLE ANNOYED YOU BY CRITICIZING YOUR DRINKING: NO
EVER HAD A DRINK FIRST THING IN THE MORNING TO STEADY YOUR NERVES TO GET RID OF A HANGOVER: NO
REASON UNABLE TO ASSESS: NO
EVER FELT BAD OR GUILTY ABOUT YOUR DRINKING: NO
HAVE YOU EVER FELT YOU SHOULD CUT DOWN ON YOUR DRINKING: NO

## 2023-11-08 ASSESSMENT — PAIN SCALES - GENERAL: PAINLEVEL_OUTOF10: 0 - NO PAIN

## 2023-11-08 NOTE — ED TRIAGE NOTES
Patient came to ED for fluid in abdomen and shortness of breath. Pt has hx of cirrohsis and states she gets paracentesis every 3 weeks. Patient states last paracentesis was 10/19.

## 2023-11-08 NOTE — PROGRESS NOTES
Patient called office stating that she wanted to kill herself. Stated she was tired of dealing with leg and stomach swelling and not being able to breath as a result of liver disease. I asked one of the doctors to come in and talk with Ms. Moreno while I went to call the police for a well check. I also called her emergency contact to make sure we had the correct address because all the patient would say was she had just moved, but would give no further details. Her daughter Princess gave me the correct address and while Dr. Lr was talking to the patient [place] [employer] showed up. The patient was upset and said she could not trust Dr. Lr because that's what everyone always does is call the police and she does not like them. Dr. Lr convinved her to answer the door despite her hesitation and she stated she did not want to be transported to the hospital. Dr. Lr continued to talk to her and told her that it would be beneficial because the ED could possibly get the fluid off of her today which would give her some relief and she would not have to wait until tomorrow. Ms. Moreno finally agreed to be transported and Dr. Lr ended the conversation and told her that she could call back if she needed anything else. [place] dispatch called the office with an update that Ms. Moreno was transported to  for suicidal ideation.

## 2023-11-09 ENCOUNTER — HOSPITAL ENCOUNTER (OUTPATIENT)
Dept: RADIOLOGY | Facility: HOSPITAL | Age: 63
Discharge: HOME | End: 2023-11-09
Payer: COMMERCIAL

## 2023-11-09 ENCOUNTER — CLINICAL SUPPORT (OUTPATIENT)
Dept: EMERGENCY MEDICINE | Facility: HOSPITAL | Age: 63
End: 2023-11-09

## 2023-11-09 VITALS
DIASTOLIC BLOOD PRESSURE: 76 MMHG | RESPIRATION RATE: 16 BRPM | HEART RATE: 114 BPM | SYSTOLIC BLOOD PRESSURE: 136 MMHG | OXYGEN SATURATION: 98 % | TEMPERATURE: 97.9 F

## 2023-11-09 DIAGNOSIS — R18.8 OTHER ASCITES: ICD-10-CM

## 2023-11-09 DIAGNOSIS — K70.31 ASCITES DUE TO ALCOHOLIC CIRRHOSIS (MULTI): ICD-10-CM

## 2023-11-09 DIAGNOSIS — K70.30 ALCOHOLIC CIRRHOSIS (MULTI): Primary | ICD-10-CM

## 2023-11-09 LAB
ATRIAL RATE: 116 BPM
P AXIS: 49 DEGREES
P OFFSET: 210 MS
P ONSET: 157 MS
PR INTERVAL: 124 MS
Q ONSET: 219 MS
QRS COUNT: 19 BEATS
QRS DURATION: 88 MS
QT INTERVAL: 326 MS
QTC CALCULATION(BAZETT): 453 MS
QTC FREDERICIA: 406 MS
R AXIS: 27 DEGREES
T AXIS: -4 DEGREES
T OFFSET: 382 MS
VENTRICULAR RATE: 116 BPM

## 2023-11-09 PROCEDURE — 49083 ABD PARACENTESIS W/IMAGING: CPT

## 2023-11-09 PROCEDURE — 49083 ABD PARACENTESIS W/IMAGING: CPT | Performed by: NURSE PRACTITIONER

## 2023-11-09 RX ORDER — ALBUMIN HUMAN 250 G/1000ML
25 SOLUTION INTRAVENOUS ONCE
Status: DISCONTINUED | OUTPATIENT
Start: 2023-11-09 | End: 2023-12-21

## 2023-11-09 ASSESSMENT — PAIN SCALES - GENERAL
PAINLEVEL_OUTOF10: 0 - NO PAIN

## 2023-11-09 ASSESSMENT — PAIN - FUNCTIONAL ASSESSMENT
PAIN_FUNCTIONAL_ASSESSMENT: 0-10

## 2023-11-09 NOTE — ED PROVIDER NOTES
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  Patient reports that she started using alcohol approximately 1 month ago and stopped using her medications at this time.  These medications include 40 of Lasix.  Patient presenting the emergency department with a documented chief concern of shortness of breath however on my examination patient denies any shortness of breath.  She states that her primary presentation is due to swelling of the bilateral lower extremities.  With the swelling of the bilateral lower extremities patient reports that she has had minimal pain, no other significant symptoms.  Patient reports that she is scheduled for paracentesis tomorrow.  Patient states that she has adequate transportation to present to this paracentesis, states that she has Lasix at home, has filled her prescription but has not taken the medications.  Patient is otherwise denying any current chest pain, shortness of breath, nausea/vomiting, fevers/chills, changes in bowel bladder function, new focal numbness weeks but her arms, legs, face.    Records Reviewed:  Recent available ED and inpatient notes reviewed in EMR.    PMHx/PSHx:  Per HPI.   - has a past medical history of Cirrhosis (CMS/HCC).  - has no past surgical history on file.    Medications:  Reviewed in EMR. See EMR for complete list of medications and doses.    Allergies:  Patient has no known allergies.    Social History:  - Tobacco:  reports that she has never smoked. She has never used smokeless tobacco.   - Alcohol:  has no history on file for alcohol use.   - Illicit Drugs:  has no history on file for drug use.     ROS:  Per HPI.       ???????????????????????????????????????????????????????????????  Triage Vitals:  T 37.3 °C (99.1 °F)  HR (!) 118  /66  RR 18  O2 97 %      Physical Exam  Vitals and nursing note reviewed.   Constitutional:       General:  She is not in acute distress.     Appearance: Normal appearance. She is not toxic-appearing.   HENT:      Head: Normocephalic and atraumatic.      Nose: No congestion or rhinorrhea.      Mouth/Throat:      Mouth: Mucous membranes are moist.      Pharynx: Oropharynx is clear.   Eyes:      Extraocular Movements: Extraocular movements intact.      Conjunctiva/sclera: Conjunctivae normal.      Pupils: Pupils are equal, round, and reactive to light.   Cardiovascular:      Rate and Rhythm: Normal rate and regular rhythm.      Pulses: Normal pulses.      Heart sounds: No murmur heard.     No friction rub. No gallop.   Pulmonary:      Effort: Pulmonary effort is normal.      Breath sounds: No wheezing, rhonchi or rales.   Abdominal:      General: There is distension.      Palpations: Abdomen is soft. There is fluid wave.      Tenderness: There is no abdominal tenderness. There is no guarding or rebound.      Comments: No tenderness to palpation in all 4 abdominal quadrants   Musculoskeletal:         General: No swelling, deformity or signs of injury. Normal range of motion.      Cervical back: Normal range of motion.      Right lower le+ Edema present.      Left lower le+ Edema present.   Skin:     General: Skin is warm and dry.      Capillary Refill: Capillary refill takes less than 2 seconds.      Findings: No bruising, lesion or rash.   Neurological:      General: No focal deficit present.      Mental Status: She is alert and oriented to person, place, and time. Mental status is at baseline.      Cranial Nerves: No cranial nerve deficit.      Sensory: No sensory deficit.      Coordination: Coordination normal.   Psychiatric:         Mood and Affect: Mood normal.         Thought Content: Thought content normal.         Judgment: Judgment normal.       ???????????????????????????????????????????????????????????????  EKG:  EKG performed at 1449 is noted to be tachycardic with a ventricular rate of 116 bpm, normal  rhythm indicating sinus tachycardia, IA interval 124, QRS duration of 88 and a QTc of 453, there are no consistent T wave inversions, no significant ST elevations, no prior EKG available for comparison.  Overall interpretation is sinus tachycardia with no other signs of ischemia or infarction.    Assessment and Plan:  Patient is a 63-year-old female with past medical history significant for alcoholic cirrhosis who is presenting to the emergency department with a chief concern of swelling in the bilateral lower extremities.  Patient reports that she has been noncompliant with diuretics over the course of the past month.  Patient reports mild pain in the bilateral lower extremity however there is no significant calf tenderness, no erythema, no asymmetric swelling that would be consistent or concerning for DVT.  Patient does have 2+ pitting edema of the bilateral lower extremities most consistent with peripheral edema likely secondary to hepatic cirrhosis.  Patient was given 40 of IV Lasix in the emergency department with appropriate response, advised to resume diuretics.  Patient's labs are reviewed and are consistent with cirrhosis.  Patient is scheduled for therapeutic paracentesis tomorrow and beyond abdominal distention and fluid wave has no tenderness to palpation that would make me concern for SBP.  With advised to resume diuretics patient is appropriate for discharge from the emergency department.  She reports that she has adequate transport social support to make paracentesis that is scheduled for tomorrow.  She agrees to return to the emergency department should she have any asymmetrical swelling of the bilateral lower extremities, significant shortness of breath, lightheadedness or near syncope.  She is discharged from emergency department stable condition.    ED Course:  ED Course as of 11/10/23 1410   Wed Nov 08, 2023   1916 Results reviewed elevated ammonia, elevated coags consistent with cirrhosis, no  concerning findings on CMP, no significant findings on CBC, chest x-ray reviewed by emergency physician no signs of significant infiltrate/pulmonary edema. [BN]   1923 Heart Rate(!): 118 [AL]      ED Course User Index  [AL] Melvin Sargent MD  [BN] Parth Golden MD         Diagnoses as of 11/10/23 1410   Localized edema        Social Determinants Limiting Care:      Disposition:  Discharge    Parth Golden MD       Procedures ? [employer] last updated 11/8/2023 7:17 PM        Parth Golden MD  Resident  11/10/23 1415

## 2023-11-09 NOTE — POST-PROCEDURE NOTE
A time out was performed and Right Hemiabdomen was examined with US and appropriate entry point was confirmed and marked.  The patient was prepped and draped in a sterile manner, 1% lidocaine was used to anesthesize the skin and subcutaneous tissue. A 5F Centesis needle was then introduced through the skin into the peritoneal space, the centesis catheter was then threaded without difficulty. 5500 ml of yellow fluid was removed without difficulty. The catheter was then removed. No immediate complications were noted during and immediately following the procedure.

## 2023-11-30 ENCOUNTER — HOSPITAL ENCOUNTER (OUTPATIENT)
Dept: RADIOLOGY | Facility: HOSPITAL | Age: 63
Discharge: HOME | End: 2023-11-30
Payer: COMMERCIAL

## 2023-11-30 VITALS
DIASTOLIC BLOOD PRESSURE: 78 MMHG | OXYGEN SATURATION: 98 % | HEART RATE: 99 BPM | TEMPERATURE: 98.1 F | RESPIRATION RATE: 16 BRPM | SYSTOLIC BLOOD PRESSURE: 111 MMHG

## 2023-11-30 DIAGNOSIS — K70.30 ALCOHOLIC CIRRHOSIS (MULTI): ICD-10-CM

## 2023-11-30 DIAGNOSIS — K70.9 ALCOHOLIC LIVER DISEASE (MULTI): Primary | ICD-10-CM

## 2023-11-30 PROCEDURE — 49083 ABD PARACENTESIS W/IMAGING: CPT | Performed by: NURSE PRACTITIONER

## 2023-11-30 PROCEDURE — 94760 N-INVAS EAR/PLS OXIMETRY 1: CPT

## 2023-11-30 PROCEDURE — 49083 ABD PARACENTESIS W/IMAGING: CPT

## 2023-11-30 ASSESSMENT — PAIN - FUNCTIONAL ASSESSMENT
PAIN_FUNCTIONAL_ASSESSMENT: 0-10
PAIN_FUNCTIONAL_ASSESSMENT: 0-10

## 2023-11-30 ASSESSMENT — PAIN SCALES - GENERAL: PAINLEVEL_OUTOF10: 0 - NO PAIN

## 2023-11-30 NOTE — PROCEDURES
A time out was performed and Left Hemiabdomen was examined with US and appropriate entry point was confirmed and marked.  The patient was prepped and draped in a sterile manner, 1% lidocaine was used to anesthesize the skin and subcutaneous tissue. A 5F Centesis needle was then introduced through the skin into the peritoneal space, the centesis catheter was then threaded without difficulty. 3500 ml of yellow fluid was removed without difficulty. The catheter was then removed. No immediate complications were noted during and immediately following the procedure.

## 2023-12-07 ENCOUNTER — APPOINTMENT (OUTPATIENT)
Dept: RADIOLOGY | Facility: HOSPITAL | Age: 63
End: 2023-12-07
Payer: COMMERCIAL

## 2023-12-07 ENCOUNTER — HOSPITAL ENCOUNTER (EMERGENCY)
Facility: HOSPITAL | Age: 63
Discharge: HOME | End: 2023-12-07
Attending: EMERGENCY MEDICINE
Payer: COMMERCIAL

## 2023-12-07 VITALS
BODY MASS INDEX: 22.98 KG/M2 | OXYGEN SATURATION: 96 % | SYSTOLIC BLOOD PRESSURE: 107 MMHG | WEIGHT: 143 LBS | HEART RATE: 106 BPM | RESPIRATION RATE: 16 BRPM | DIASTOLIC BLOOD PRESSURE: 75 MMHG | TEMPERATURE: 100.9 F | HEIGHT: 66 IN

## 2023-12-07 DIAGNOSIS — R60.0 BILATERAL LOWER EXTREMITY EDEMA: Primary | ICD-10-CM

## 2023-12-07 DIAGNOSIS — E88.09 HYPOALBUMINEMIA: ICD-10-CM

## 2023-12-07 LAB
ALBUMIN SERPL BCP-MCNC: 3.1 G/DL (ref 3.4–5)
ALP SERPL-CCNC: 170 U/L (ref 33–136)
ALT SERPL W P-5'-P-CCNC: 35 U/L (ref 7–45)
ANION GAP SERPL CALC-SCNC: 15 MMOL/L (ref 10–20)
APTT PPP: 30 SECONDS (ref 27–38)
AST SERPL W P-5'-P-CCNC: 85 U/L (ref 9–39)
BASOPHILS # BLD AUTO: 0.06 X10*3/UL (ref 0–0.1)
BASOPHILS NFR BLD AUTO: 1.1 %
BILIRUB SERPL-MCNC: 1.9 MG/DL (ref 0–1.2)
BUN SERPL-MCNC: 4 MG/DL (ref 6–23)
CALCIUM SERPL-MCNC: 8.5 MG/DL (ref 8.6–10.6)
CHLORIDE SERPL-SCNC: 110 MMOL/L (ref 98–107)
CO2 SERPL-SCNC: 20 MMOL/L (ref 21–32)
CREAT SERPL-MCNC: 0.58 MG/DL (ref 0.5–1.05)
EOSINOPHIL # BLD AUTO: 0.2 X10*3/UL (ref 0–0.7)
EOSINOPHIL NFR BLD AUTO: 3.7 %
ERYTHROCYTE [DISTWIDTH] IN BLOOD BY AUTOMATED COUNT: 19.6 % (ref 11.5–14.5)
GFR SERPL CREATININE-BSD FRML MDRD: >90 ML/MIN/1.73M*2
GLUCOSE SERPL-MCNC: 97 MG/DL (ref 74–99)
HCT VFR BLD AUTO: 35.2 % (ref 36–46)
HGB BLD-MCNC: 11.5 G/DL (ref 12–16)
IMM GRANULOCYTES # BLD AUTO: 0.01 X10*3/UL (ref 0–0.7)
IMM GRANULOCYTES NFR BLD AUTO: 0.2 % (ref 0–0.9)
INR PPP: 1.2 (ref 0.9–1.1)
LYMPHOCYTES # BLD AUTO: 1.76 X10*3/UL (ref 1.2–4.8)
LYMPHOCYTES NFR BLD AUTO: 33 %
MAGNESIUM SERPL-MCNC: 1.97 MG/DL (ref 1.6–2.4)
MCH RBC QN AUTO: 27.4 PG (ref 26–34)
MCHC RBC AUTO-ENTMCNC: 32.7 G/DL (ref 32–36)
MCV RBC AUTO: 84 FL (ref 80–100)
MONOCYTES # BLD AUTO: 1.06 X10*3/UL (ref 0.1–1)
MONOCYTES NFR BLD AUTO: 19.9 %
NEUTROPHILS # BLD AUTO: 2.25 X10*3/UL (ref 1.2–7.7)
NEUTROPHILS NFR BLD AUTO: 42.1 %
NRBC BLD-RTO: 0 /100 WBCS (ref 0–0)
PHOSPHATE SERPL-MCNC: 3.2 MG/DL (ref 2.5–4.9)
PLATELET # BLD AUTO: 123 X10*3/UL (ref 150–450)
POTASSIUM SERPL-SCNC: 4 MMOL/L (ref 3.5–5.3)
PROT SERPL-MCNC: 8.3 G/DL (ref 6.4–8.2)
PROTHROMBIN TIME: 13 SECONDS (ref 9.8–12.8)
RBC # BLD AUTO: 4.19 X10*6/UL (ref 4–5.2)
SODIUM SERPL-SCNC: 141 MMOL/L (ref 136–145)
WBC # BLD AUTO: 5.3 X10*3/UL (ref 4.4–11.3)

## 2023-12-07 PROCEDURE — 71045 X-RAY EXAM CHEST 1 VIEW: CPT | Mod: FR

## 2023-12-07 PROCEDURE — 36415 COLL VENOUS BLD VENIPUNCTURE: CPT

## 2023-12-07 PROCEDURE — 83735 ASSAY OF MAGNESIUM: CPT

## 2023-12-07 PROCEDURE — 85730 THROMBOPLASTIN TIME PARTIAL: CPT

## 2023-12-07 PROCEDURE — 99284 EMERGENCY DEPT VISIT MOD MDM: CPT | Performed by: EMERGENCY MEDICINE

## 2023-12-07 PROCEDURE — 71045 X-RAY EXAM CHEST 1 VIEW: CPT | Mod: FOREIGN READ | Performed by: RADIOLOGY

## 2023-12-07 PROCEDURE — 99283 EMERGENCY DEPT VISIT LOW MDM: CPT | Mod: 25 | Performed by: EMERGENCY MEDICINE

## 2023-12-07 PROCEDURE — 85025 COMPLETE CBC W/AUTO DIFF WBC: CPT

## 2023-12-07 PROCEDURE — 80053 COMPREHEN METABOLIC PANEL: CPT

## 2023-12-07 PROCEDURE — 84100 ASSAY OF PHOSPHORUS: CPT

## 2023-12-07 RX ORDER — FUROSEMIDE 40 MG/1
40 TABLET ORAL DAILY
Qty: 5 TABLET | Refills: 0 | Status: SHIPPED | OUTPATIENT
Start: 2023-12-07 | End: 2023-12-26 | Stop reason: HOSPADM

## 2023-12-07 ASSESSMENT — COLUMBIA-SUICIDE SEVERITY RATING SCALE - C-SSRS
1. IN THE PAST MONTH, HAVE YOU WISHED YOU WERE DEAD OR WISHED YOU COULD GO TO SLEEP AND NOT WAKE UP?: NO
6. HAVE YOU EVER DONE ANYTHING, STARTED TO DO ANYTHING, OR PREPARED TO DO ANYTHING TO END YOUR LIFE?: NO
2. HAVE YOU ACTUALLY HAD ANY THOUGHTS OF KILLING YOURSELF?: NO

## 2023-12-07 ASSESSMENT — PAIN - FUNCTIONAL ASSESSMENT: PAIN_FUNCTIONAL_ASSESSMENT: 0-10

## 2023-12-07 ASSESSMENT — PAIN SCALES - GENERAL: PAINLEVEL_OUTOF10: 0 - NO PAIN

## 2023-12-07 NOTE — ED PROVIDER NOTES
HPI   Chief Complaint   Patient presents with    Numbness       Patient is a 63-year-old female with past medical history of liver cirrhosis with ascites, chronic hydronephrosis, bipolar disease presenting with concern for right lower leg numbness since this morning.  Patient endorses numbness has been inconsistently located but predominantly on right anterior shin down to foot.  Patient does endorse some associated pain related to swelling in that leg but otherwise does not endorse profound weakness other than related to pain.  Patient endorses that she had a paracentesis on Thursday but otherwise does not endorse fevers, chills, malaise, or abdominal pain other than mild tenderness at site of needle insertion.  Patient endorses she had similar symptoms a year ago related to worsening edema.  On review of systems, patient otherwise endorses 1 episode of coughing up a streak ok blood but otherwise no cough, congestion, rhinorrhea, or repeated hemoptysis.  Patient otherwise does not endorse any blood in stools or dark bowel movements or any concern for bleeding anywhere else.                          No data recorded                Patient History   Past Medical History:   Diagnosis Date    Encounter for gynecological examination (general) (routine) without abnormal findings 07/20/2022    Well woman exam    Trichomonal vulvovaginitis 07/30/2022    Trichomonas vaginitis     Past Surgical History:   Procedure Laterality Date    CHOLECYSTECTOMY  11/12/2014    Cholecystectomy    US GUIDED ABDOMINAL PARACENTESIS  6/14/2023    US GUIDED ABDOMINAL PARACENTESIS 6/14/2023 [facility] [place]    US GUIDED ABDOMINAL PARACENTESIS  7/3/2023    US GUIDED ABDOMINAL PARACENTESIS 7/3/2023 [facility] [place]    US GUIDED ABDOMINAL PARACENTESIS  9/7/2023    US GUIDED ABDOMINAL PARACENTESIS 9/7/2023 [facility] [place]    US GUIDED ABDOMINAL PARACENTESIS  9/28/2023    US GUIDED ABDOMINAL PARACENTESIS 9/28/2023 [facility] [place]    US GUIDED ABDOMINAL PARACENTESIS  10/19/2023    [place]  GUIDED ABDOMINAL PARACENTESIS 10[place]19/2023 [facility] [place]    US GUIDED ABDOMINAL PARACENTESIS  11/9/2023    US GUIDED ABDOMINAL PARACENTESIS 11/9/2023 Carlos Chan, APRN-CNP [facility] [place]    US GUIDED ABDOMINAL PARACENTESIS  11/30/2023    US GUIDED ABDOMINAL PARACENTESIS 11/30/2023 [facility]     No family history on file.  Social History     Tobacco Use    Smoking status: Not on file    Smokeless tobacco: Not on file   Substance Use Topics    Alcohol use: Not on file    Drug use: Not on file       Physical Exam   ED Triage Vitals [12/07/23 0742]   Temp Heart Rate Resp BP   38.3 °C (100.9 °F) 106 16 107/75      SpO2 Temp Source Heart Rate Source Patient Position   96 % Temporal -- --      BP Location FiO2 (%)     -- --       Physical Exam  Vitals and nursing note reviewed.   Constitutional:       General: She is not in acute distress.     Appearance: She is well-developed.   HENT:      Head: Normocephalic and atraumatic.   Eyes:      Conjunctiva/sclera: Conjunctivae normal.   Cardiovascular:      Rate and Rhythm: Normal rate and regular rhythm.      Heart sounds: No murmur heard.  Pulmonary:      Effort: Pulmonary effort is normal. No respiratory distress.      Breath sounds: Normal breath sounds.   Abdominal:      Comments: Very mild distention, no palpable fluid wave.  No significant tenderness palpation.  Tolerates deep palpation in all 4 quadrants.   Musculoskeletal:         General: No swelling.      Cervical back: Neck supple.   Skin:     General: Skin is warm and dry.      Capillary Refill: Capillary refill takes less than 2 seconds.   Neurological:      Mental Status: She is alert.      Comments: Inconsistent neurological exam.  5/5 strength in hip flexion, knee flexion/extension, dorsiflexion/plantarflexion in bilateral lower extremities.  Reported complete absence of symptoms most consistently in L5 distribution on right shin and foot but does not extend up to hip or lower back.  Reported sensation changes otherwise in  left lower extremity but does respond to pain in other dermatome distributions of lower leg.  No saddle anesthesia.  Reports foot pain with ambulation and resistant to ambulation but able to ambulate.   Psychiatric:         Mood and Affect: Mood normal.         ED Course & MDM   Diagnoses as of 12/07/23 1133   Hypoalbuminemia   Bilateral lower extremity edema       Medical Decision Making  Patient is a 63-year-old female with past medical history of liver cirrhosis with ascites, chronic hydronephrosis, bipolar disease presenting with concern for right lower leg numbness since this morning.  Neuroexam fairly nonspecific with roughly L5 distribution of sensory loss but inconsistent.  No associated symptoms concerning for cauda equina.  Otherwise no deficits in adjacent dermatomes and no weakness or associated neurological symptoms.  Overall impression is this is most probably related to patient's worsening lower extremity edema suspected to be secondary to hypoalbuminemia and liver failure.  Patient otherwise screened for electrolyte abnormalities to explain sensation loss  but labs generally near patient's baseline.  Outpatient spinal MRI discussed as well but not currently recommended given inability to isolate symptoms to spinal cord level.  Central cause of sensory loss felt to be less likely as well given isolated symptoms on anterior shin and CT head deferred at this time.  Exam otherwise less consistent with peripheral cause of symptoms as symptoms are unilateral, not in stocking distribution.  Patient otherwise with reported single episode of hemoptysis but INR near baseline, patient otherwise with no infectious symptoms and chest x-ray generally benign.  Patient with no evidence in oropharynx of bleeding and no reported symptoms of GI bleed.  Paracentesis considered as well but patient with benign abdominal exam, had paracentesis on Thursday. Patient otherwise has no leukocytosis, did not have fever on repeat  evaluation and has no other infectious symptoms.  Given predominant concern is for worsening lower extremity swelling, patient prescribed 5-day course of additional 20 mg furosemide and recommended to use compression stockings.  Return precautions discussed with patient and patient discharged home.    Patient seen and discussed with Dr. Abdias Le MD, PhD  Emergency Medicine PGY2          Procedure  Procedures     Adama Le MD  Resident  12/07/23 5862

## 2023-12-07 NOTE — ED TRIAGE NOTES
Pt presents to the ED with R lower leg numbness. Denies injury to leg and states that she was walking and then all of a sudden it went numb and she had to drag her leg. Feelings are the same on both sides. No other sx at this time. Denies pain. Pmhx cirrhosis of liver.

## 2023-12-07 NOTE — DISCHARGE INSTRUCTIONS
We are most concerned your symptoms are exacerbated by your worsening lower extremity swelling.  We would recommend an additional dose of your water pill for 5 days and using compression stockings.  Following up with your liver doctor for treatment of your low blood protein will also help this.  We would recommend follow-up with neurology if this does not improve symptoms for consideration of spinal imaging.

## 2023-12-21 ENCOUNTER — HOSPITAL ENCOUNTER (OUTPATIENT)
Dept: RADIOLOGY | Facility: HOSPITAL | Age: 63
Discharge: HOME | End: 2023-12-21
Payer: COMMERCIAL

## 2023-12-21 ENCOUNTER — TELEPHONE (OUTPATIENT)
Dept: PRIMARY CARE | Facility: HOSPITAL | Age: 63
End: 2023-12-21

## 2023-12-21 VITALS
HEART RATE: 97 BPM | TEMPERATURE: 98.2 F | DIASTOLIC BLOOD PRESSURE: 73 MMHG | SYSTOLIC BLOOD PRESSURE: 113 MMHG | OXYGEN SATURATION: 99 % | RESPIRATION RATE: 18 BRPM

## 2023-12-21 DIAGNOSIS — K70.9 ALCOHOLIC LIVER DISEASE (MULTI): ICD-10-CM

## 2023-12-21 DIAGNOSIS — K70.30 ALCOHOLIC CIRRHOSIS (MULTI): ICD-10-CM

## 2023-12-21 DIAGNOSIS — K70.31 ALCOHOLIC CIRRHOSIS OF LIVER WITH ASCITES (MULTI): Primary | ICD-10-CM

## 2023-12-21 PROCEDURE — P9047 ALBUMIN (HUMAN), 25%, 50ML: HCPCS | Mod: JZ,SE | Performed by: NURSE PRACTITIONER

## 2023-12-21 PROCEDURE — 49083 ABD PARACENTESIS W/IMAGING: CPT | Performed by: NURSE PRACTITIONER

## 2023-12-21 PROCEDURE — 2500000004 HC RX 250 GENERAL PHARMACY W/ HCPCS (ALT 636 FOR OP/ED): Mod: JZ,SE | Performed by: NURSE PRACTITIONER

## 2023-12-21 PROCEDURE — 49083 ABD PARACENTESIS W/IMAGING: CPT

## 2023-12-21 RX ORDER — ALBUMIN HUMAN 250 G/1000ML
25 SOLUTION INTRAVENOUS ONCE
Status: DISCONTINUED | OUTPATIENT
Start: 2023-12-21 | End: 2023-12-21

## 2023-12-21 RX ORDER — ALBUMIN HUMAN 250 G/1000ML
25 SOLUTION INTRAVENOUS ONCE
Status: COMPLETED | OUTPATIENT
Start: 2023-12-21 | End: 2023-12-21

## 2023-12-21 RX ADMIN — ALBUMIN (HUMAN) 25 G: 0.25 INJECTION, SOLUTION INTRAVENOUS at 10:02

## 2023-12-21 ASSESSMENT — ENCOUNTER SYMPTOMS
BACK PAIN: 1
NEUROLOGICAL NEGATIVE: 1
CARDIOVASCULAR NEGATIVE: 1
ACTIVITY CHANGE: 1
ABDOMINAL DISTENTION: 1
PSYCHIATRIC NEGATIVE: 1
RESPIRATORY NEGATIVE: 1
FATIGUE: 1
ABDOMINAL PAIN: 1

## 2023-12-21 NOTE — H&P
History Of Present Illness  Yolie Moreno is a 63 y.o. female presenting with ascites, cirrhosis.     Past Medical History  Past Medical History:   Diagnosis Date    Encounter for gynecological examination (general) (routine) without abnormal findings 07/20/2022    Well woman exam    Trichomonal vulvovaginitis 07/30/2022    Trichomonas vaginitis       Surgical History  Past Surgical History:   Procedure Laterality Date    CHOLECYSTECTOMY  11/12/2014    Cholecystectomy    US GUIDED ABDOMINAL PARACENTESIS  6/14/2023    US GUIDED ABDOMINAL PARACENTESIS 6/14/2023 [facility] [place]    US GUIDED ABDOMINAL PARACENTESIS  7/3/2023    US GUIDED ABDOMINAL PARACENTESIS 7/3/2023 [facility] [place]    US GUIDED ABDOMINAL PARACENTESIS  9/7/2023    US GUIDED ABDOMINAL PARACENTESIS 9/7/2023 [facility] [place]    US GUIDED ABDOMINAL PARACENTESIS  9/28/2023    US GUIDED ABDOMINAL PARACENTESIS 9/28/2023 [facility] [place] [facility]    US GUIDED ABDOMINAL PARACENTESIS  10/19/2023    US GUIDED ABDOMINAL PARACENTESIS 10/19/2023 [facility] [place] [facility]    US GUIDED ABDOMINAL PARACENTESIS  11/9/2023    US GUIDED ABDOMINAL PARACENTESIS 11/9/2023 Carlos Chan, APRN-CNP [facility] [place] [facility]    US GUIDED ABDOMINAL PARACENTESIS  11/30/2023    US GUIDED ABDOMINAL PARACENTESIS 11/30/2023 [facility] [place] [facility]        Social History  She has no history on file for tobacco use, alcohol use, and drug use.    Family History  No family history on file.     Allergies  Patient has no known allergies.    Review of Systems   Constitutional:  Positive for activity change and fatigue.   Respiratory: Negative.     Cardiovascular: Negative.    Gastrointestinal:  Positive for abdominal distention and abdominal pain.   Musculoskeletal:  Positive for back pain.   Skin: Negative.    Neurological: Negative.    Psychiatric/Behavioral: Negative.          Physical Exam     Last Recorded Vitals  Blood pressure 113/73, pulse 97, temperature 36.8 °C (98.2 °F), resp. rate 18, SpO2 99 %.    Relevant Results      12/7/23 Labs:    INR: 1.2  Plt: 123  H+H: 11.5/35.2         Assessment/Plan   Active Problems:  There are no active Hospital Problems.      Paracentesis       I spent 30 minutes in the professional and overall care of this patient.      Carlos Chan, MOODY-CNP

## 2023-12-21 NOTE — DISCHARGE INSTRUCTIONS
Discharge information    Ok to remove dressing on 12/22/23 at 1000.  Ok to shower on 12/22/23, no baths, jacuzzis, or swimming. Do not get submerged in a body of water (leads to increased risk of infection.)  Ok to keep open until healed. Healing is when a scab is created and falls off, usually within 5-10 days.     Look for signs and symptoms of infection:  Including: redness, swelling, discharge such as pus, or odor from site.    Look for bleeding from site:  If bleeding occurs hold pressure for 5 minutes with paper towel, check site if still bleeding hold for 5 more minutes  If site continues to bleed after 10 minutes call 911.    For questions related to your procedure:  Please call [phone] between the hours of 7:00am-5:00pm Monday through Friday.  Please call [phone] after 5:00pm weeknights and on weekends and holidays.     In the event of an emergency call 911 or go to your nearest emergency room.

## 2023-12-21 NOTE — POST-PROCEDURE NOTE
INTERVENTIONAL RADIOLOGY ADVANCED PRACTICE PROCEDURE  [facility] (formerly [facility])    A time out was performed and Left Hemiabdomen was examined with US and appropriate entry point was confirmed and marked.   The patient was prepped and draped in a sterile manner, 1% lidocaine was used to anesthesize the skin and subcutaneous tissue.   A 5F Centesis needle was then introduced through the skin into the peritoneal space, the centesis catheter was then threaded without difficulty.   6300 ml of yellow fluid was removed without difficulty. The catheter was then removed.   No immediate complications were noted during and immediately following the procedure.

## 2023-12-22 ENCOUNTER — APPOINTMENT (OUTPATIENT)
Dept: RADIOLOGY | Facility: HOSPITAL | Age: 63
End: 2023-12-22
Payer: COMMERCIAL

## 2023-12-22 ENCOUNTER — HOSPITAL ENCOUNTER (INPATIENT)
Facility: HOSPITAL | Age: 63
LOS: 3 days | Discharge: LEFT BEFORE DISCHARGE | End: 2023-12-26
Attending: EMERGENCY MEDICINE | Admitting: NURSE PRACTITIONER
Payer: COMMERCIAL

## 2023-12-22 DIAGNOSIS — I85.10 SECONDARY ESOPHAGEAL VARICES WITHOUT BLEEDING (MULTI): ICD-10-CM

## 2023-12-22 DIAGNOSIS — K70.31 ALCOHOLIC CIRRHOSIS OF LIVER WITH ASCITES (MULTI): ICD-10-CM

## 2023-12-22 DIAGNOSIS — K55.069 SUPERIOR MESENTERIC VEIN THROMBOSIS (MULTI): Primary | ICD-10-CM

## 2023-12-22 DIAGNOSIS — I85.00 ESOPHAGEAL VARICES WITHOUT BLEEDING, UNSPECIFIED ESOPHAGEAL VARICES TYPE (MULTI): ICD-10-CM

## 2023-12-22 DIAGNOSIS — R18.8 OTHER ASCITES: ICD-10-CM

## 2023-12-22 DIAGNOSIS — K55.069 MESENTERIC VENOUS THROMBOSIS (MULTI): ICD-10-CM

## 2023-12-22 LAB
ABO GROUP (TYPE) IN BLOOD: NORMAL
ALBUMIN SERPL BCP-MCNC: 2.9 G/DL (ref 3.4–5)
ALP SERPL-CCNC: 142 U/L (ref 33–136)
ALT SERPL W P-5'-P-CCNC: 42 U/L (ref 7–45)
AMPHETAMINES UR QL SCN: NORMAL
ANION GAP SERPL CALC-SCNC: 10 MMOL/L (ref 10–20)
ANTIBODY SCREEN: NORMAL
APAP SERPL-MCNC: <10 UG/ML
APPEARANCE UR: CLEAR
APTT PPP: 33 SECONDS (ref 27–38)
AST SERPL W P-5'-P-CCNC: 78 U/L (ref 9–39)
BARBITURATES UR QL SCN: NORMAL
BASOPHILS # BLD AUTO: 0.06 X10*3/UL (ref 0–0.1)
BASOPHILS NFR BLD AUTO: 1 %
BENZODIAZ UR QL SCN: NORMAL
BILIRUB SERPL-MCNC: 1.6 MG/DL (ref 0–1.2)
BILIRUB UR STRIP.AUTO-MCNC: NEGATIVE MG/DL
BUN SERPL-MCNC: 6 MG/DL (ref 6–23)
BZE UR QL SCN: NORMAL
CALCIUM SERPL-MCNC: 8.3 MG/DL (ref 8.6–10.6)
CANNABINOIDS UR QL SCN: NORMAL
CHLORIDE SERPL-SCNC: 107 MMOL/L (ref 98–107)
CO2 SERPL-SCNC: 22 MMOL/L (ref 21–32)
COLOR UR: YELLOW
CREAT SERPL-MCNC: 0.61 MG/DL (ref 0.5–1.05)
EOSINOPHIL # BLD AUTO: 0.08 X10*3/UL (ref 0–0.7)
EOSINOPHIL NFR BLD AUTO: 1.3 %
ERYTHROCYTE [DISTWIDTH] IN BLOOD BY AUTOMATED COUNT: 18.5 % (ref 11.5–14.5)
ETHANOL SERPL-MCNC: 18 MG/DL
FENTANYL+NORFENTANYL UR QL SCN: NORMAL
GFR SERPL CREATININE-BSD FRML MDRD: >90 ML/MIN/1.73M*2
GLUCOSE SERPL-MCNC: 109 MG/DL (ref 74–99)
GLUCOSE UR STRIP.AUTO-MCNC: NEGATIVE MG/DL
HCT VFR BLD AUTO: 33.5 % (ref 36–46)
HGB BLD-MCNC: 11.3 G/DL (ref 12–16)
IMM GRANULOCYTES # BLD AUTO: 0.02 X10*3/UL (ref 0–0.7)
IMM GRANULOCYTES NFR BLD AUTO: 0.3 % (ref 0–0.9)
INR PPP: 1.3 (ref 0.9–1.1)
KETONES UR STRIP.AUTO-MCNC: NEGATIVE MG/DL
LEUKOCYTE ESTERASE UR QL STRIP.AUTO: ABNORMAL
LYMPHOCYTES # BLD AUTO: 1.28 X10*3/UL (ref 1.2–4.8)
LYMPHOCYTES NFR BLD AUTO: 21.3 %
MAGNESIUM SERPL-MCNC: 2.1 MG/DL (ref 1.6–2.4)
MCH RBC QN AUTO: 28.5 PG (ref 26–34)
MCHC RBC AUTO-ENTMCNC: 33.7 G/DL (ref 32–36)
MCV RBC AUTO: 84 FL (ref 80–100)
MONOCYTES # BLD AUTO: 1.43 X10*3/UL (ref 0.1–1)
MONOCYTES NFR BLD AUTO: 23.8 %
MUCOUS THREADS #/AREA URNS AUTO: ABNORMAL /LPF
NEUTROPHILS # BLD AUTO: 3.14 X10*3/UL (ref 1.2–7.7)
NEUTROPHILS NFR BLD AUTO: 52.3 %
NITRITE UR QL STRIP.AUTO: POSITIVE
NRBC BLD-RTO: 0 /100 WBCS (ref 0–0)
OPIATES UR QL SCN: NORMAL
OXYCODONE+OXYMORPHONE UR QL SCN: NORMAL
PCP UR QL SCN: NORMAL
PH UR STRIP.AUTO: 5 [PH]
PLATELET # BLD AUTO: 98 X10*3/UL (ref 150–450)
POTASSIUM SERPL-SCNC: 3.4 MMOL/L (ref 3.5–5.3)
PREGNANCY TEST URINE, POC: NEGATIVE
PROT SERPL-MCNC: 7.3 G/DL (ref 6.4–8.2)
PROT UR STRIP.AUTO-MCNC: NEGATIVE MG/DL
PROTHROMBIN TIME: 14.1 SECONDS (ref 9.8–12.8)
RBC # BLD AUTO: 3.97 X10*6/UL (ref 4–5.2)
RBC # UR STRIP.AUTO: NEGATIVE /UL
RBC #/AREA URNS AUTO: ABNORMAL /HPF
RH FACTOR (ANTIGEN D): NORMAL
SALICYLATES SERPL-MCNC: <3 MG/DL
SARS-COV-2 RNA RESP QL NAA+PROBE: NOT DETECTED
SODIUM SERPL-SCNC: 136 MMOL/L (ref 136–145)
SP GR UR STRIP.AUTO: 1.04
SQUAMOUS #/AREA URNS AUTO: ABNORMAL /HPF
UROBILINOGEN UR STRIP.AUTO-MCNC: 4 MG/DL
WBC # BLD AUTO: 6 X10*3/UL (ref 4.4–11.3)
WBC #/AREA URNS AUTO: ABNORMAL /HPF

## 2023-12-22 PROCEDURE — 2550000001 HC RX 255 CONTRASTS: Mod: SE | Performed by: EMERGENCY MEDICINE

## 2023-12-22 PROCEDURE — 84075 ASSAY ALKALINE PHOSPHATASE: CPT | Performed by: STUDENT IN AN ORGANIZED HEALTH CARE EDUCATION/TRAINING PROGRAM

## 2023-12-22 PROCEDURE — 83880 ASSAY OF NATRIURETIC PEPTIDE: CPT | Performed by: NURSE PRACTITIONER

## 2023-12-22 PROCEDURE — 74177 CT ABD & PELVIS W/CONTRAST: CPT

## 2023-12-22 PROCEDURE — 87086 URINE CULTURE/COLONY COUNT: CPT | Performed by: EMERGENCY MEDICINE

## 2023-12-22 PROCEDURE — 74177 CT ABD & PELVIS W/CONTRAST: CPT | Performed by: RADIOLOGY

## 2023-12-22 PROCEDURE — 99285 EMERGENCY DEPT VISIT HI MDM: CPT | Performed by: EMERGENCY MEDICINE

## 2023-12-22 PROCEDURE — 87635 SARS-COV-2 COVID-19 AMP PRB: CPT | Performed by: EMERGENCY MEDICINE

## 2023-12-22 PROCEDURE — 36415 COLL VENOUS BLD VENIPUNCTURE: CPT | Performed by: STUDENT IN AN ORGANIZED HEALTH CARE EDUCATION/TRAINING PROGRAM

## 2023-12-22 PROCEDURE — 84443 ASSAY THYROID STIM HORMONE: CPT | Performed by: NURSE PRACTITIONER

## 2023-12-22 PROCEDURE — 83735 ASSAY OF MAGNESIUM: CPT | Performed by: STUDENT IN AN ORGANIZED HEALTH CARE EDUCATION/TRAINING PROGRAM

## 2023-12-22 PROCEDURE — 80307 DRUG TEST PRSMV CHEM ANLYZR: CPT | Performed by: STUDENT IN AN ORGANIZED HEALTH CARE EDUCATION/TRAINING PROGRAM

## 2023-12-22 PROCEDURE — 2500000005 HC RX 250 GENERAL PHARMACY W/O HCPCS: Mod: SE | Performed by: STUDENT IN AN ORGANIZED HEALTH CARE EDUCATION/TRAINING PROGRAM

## 2023-12-22 PROCEDURE — 80143 DRUG ASSAY ACETAMINOPHEN: CPT | Performed by: STUDENT IN AN ORGANIZED HEALTH CARE EDUCATION/TRAINING PROGRAM

## 2023-12-22 PROCEDURE — 86901 BLOOD TYPING SEROLOGIC RH(D): CPT | Performed by: STUDENT IN AN ORGANIZED HEALTH CARE EDUCATION/TRAINING PROGRAM

## 2023-12-22 PROCEDURE — 85025 COMPLETE CBC W/AUTO DIFF WBC: CPT | Performed by: STUDENT IN AN ORGANIZED HEALTH CARE EDUCATION/TRAINING PROGRAM

## 2023-12-22 PROCEDURE — 96365 THER/PROPH/DIAG IV INF INIT: CPT

## 2023-12-22 PROCEDURE — 2500000004 HC RX 250 GENERAL PHARMACY W/ HCPCS (ALT 636 FOR OP/ED): Mod: SE | Performed by: EMERGENCY MEDICINE

## 2023-12-22 PROCEDURE — 85610 PROTHROMBIN TIME: CPT | Performed by: STUDENT IN AN ORGANIZED HEALTH CARE EDUCATION/TRAINING PROGRAM

## 2023-12-22 PROCEDURE — 81001 URINALYSIS AUTO W/SCOPE: CPT | Performed by: EMERGENCY MEDICINE

## 2023-12-22 PROCEDURE — 96366 THER/PROPH/DIAG IV INF ADDON: CPT

## 2023-12-22 RX ORDER — CEFTRIAXONE 1 G/50ML
1 INJECTION, SOLUTION INTRAVENOUS ONCE
Status: COMPLETED | OUTPATIENT
Start: 2023-12-22 | End: 2023-12-23

## 2023-12-22 RX ORDER — LIDOCAINE 560 MG/1
1 PATCH PERCUTANEOUS; TOPICAL; TRANSDERMAL ONCE
Status: COMPLETED | OUTPATIENT
Start: 2023-12-22 | End: 2023-12-23

## 2023-12-22 RX ADMIN — IOHEXOL 80 ML: 350 INJECTION, SOLUTION INTRAVENOUS at 20:50

## 2023-12-22 RX ADMIN — LIDOCAINE 1 PATCH: 4 PATCH TOPICAL at 20:11

## 2023-12-22 RX ADMIN — CEFTRIAXONE SODIUM 1 G: 1 INJECTION, SOLUTION INTRAVENOUS at 22:57

## 2023-12-22 SDOH — HEALTH STABILITY: MENTAL HEALTH: IN THE PAST FEW WEEKS, HAVE YOU WISHED YOU WERE DEAD?: YES

## 2023-12-22 SDOH — HEALTH STABILITY: MENTAL HEALTH
DEPRESSION SYMPTOMS: FEELINGS OF HELPLESSNESS;FEELINGS OF HOPELESSESS;INCREASED IRRITABILITY;ISOLATIVE;LOSS OF INTEREST;SLEEP DISTURBANCE

## 2023-12-22 SDOH — HEALTH STABILITY: MENTAL HEALTH: IN THE PAST FEW WEEKS, HAVE YOU FELT THAT YOU OR YOUR FAMILY WOULD BE BETTER OFF IF YOU WERE DEAD?: YES

## 2023-12-22 SDOH — ECONOMIC STABILITY: HOUSING INSECURITY: FEELS SAFE LIVING IN HOME: YES

## 2023-12-22 SDOH — HEALTH STABILITY: MENTAL HEALTH: SUICIDAL BEHAVIOR (LIFETIME): YES

## 2023-12-22 SDOH — HEALTH STABILITY: MENTAL HEALTH: NON-SPECIFIC ACTIVE SUICIDAL THOUGHTS (PAST 1 MONTH): YES

## 2023-12-22 SDOH — HEALTH STABILITY: MENTAL HEALTH: ARE YOU HAVING THOUGHTS OF KILLING YOURSELF RIGHT NOW?: NO

## 2023-12-22 SDOH — HEALTH STABILITY: MENTAL HEALTH: WHEN DID YOU TRY TO KILL YOURSELF?: 10 YEARS AGO

## 2023-12-22 SDOH — HEALTH STABILITY: MENTAL HEALTH: HOW DID YOU TRY TO KILL YOURSELF?: CUTTING WRIST

## 2023-12-22 SDOH — HEALTH STABILITY: MENTAL HEALTH: ACTIVE SUICIDAL IDEATION WITH SPECIFIC PLAN AND INTENT (PAST 1 MONTH): YES

## 2023-12-22 SDOH — HEALTH STABILITY: MENTAL HEALTH: ANXIETY SYMPTOMS: NO PROBLEMS REPORTED OR OBSERVED.

## 2023-12-22 SDOH — HEALTH STABILITY: MENTAL HEALTH: ACTIVE SUICIDAL IDEATION WITH SOME INTENT TO ACT, WITHOUT SPECIFIC PLAN (PAST 1 MONTH): YES

## 2023-12-22 SDOH — HEALTH STABILITY: MENTAL HEALTH: HAVE YOU EVER TRIED TO KILL YOURSELF?: YES

## 2023-12-22 SDOH — ECONOMIC STABILITY: GENERAL

## 2023-12-22 SDOH — HEALTH STABILITY: MENTAL HEALTH: WISH TO BE DEAD (PAST 1 MONTH): YES

## 2023-12-22 SDOH — HEALTH STABILITY: MENTAL HEALTH: IN THE PAST WEEK, HAVE YOU BEEN HAVING THOUGHTS ABOUT KILLING YOURSELF?: YES

## 2023-12-22 SDOH — HEALTH STABILITY: MENTAL HEALTH: SUICIDAL BEHAVIOR (3 MONTHS): YES

## 2023-12-22 SDOH — HEALTH STABILITY: MENTAL HEALTH
SUICIDAL BEHAVIOR (DESCRIPTION): PATIENT CUT WRIST 10 YEARS AGO. TODAY SHE HAD BLOOD PRESSURE PILLS OUT AND WAS READY TO INGEST THEM, BUT THANKFULLY DID NOT. PATIENT HAD SUICIDAL IDEATION WITH A CLEAR PLAN TO OVERDOSE

## 2023-12-22 ASSESSMENT — PAIN SCALES - GENERAL
PAINLEVEL_OUTOF10: 10 - WORST POSSIBLE PAIN
PAINLEVEL_OUTOF10: 0 - NO PAIN
PAINLEVEL_OUTOF10: 10 - WORST POSSIBLE PAIN

## 2023-12-22 ASSESSMENT — PAIN - FUNCTIONAL ASSESSMENT
PAIN_FUNCTIONAL_ASSESSMENT: 0-10
PAIN_FUNCTIONAL_ASSESSMENT: 0-10

## 2023-12-22 ASSESSMENT — COLUMBIA-SUICIDE SEVERITY RATING SCALE - C-SSRS
5. HAVE YOU STARTED TO WORK OUT OR WORKED OUT THE DETAILS OF HOW TO KILL YOURSELF? DO YOU INTEND TO CARRY OUT THIS PLAN?: YES
6. HAVE YOU EVER DONE ANYTHING, STARTED TO DO ANYTHING, OR PREPARED TO DO ANYTHING TO END YOUR LIFE?: YES
4. HAVE YOU HAD THESE THOUGHTS AND HAD SOME INTENTION OF ACTING ON THEM?: YES
2. HAVE YOU ACTUALLY HAD ANY THOUGHTS OF KILLING YOURSELF?: YES
1. IN THE PAST MONTH, HAVE YOU WISHED YOU WERE DEAD OR WISHED YOU COULD GO TO SLEEP AND NOT WAKE UP?: YES
6. HAVE YOU EVER DONE ANYTHING, STARTED TO DO ANYTHING, OR PREPARED TO DO ANYTHING TO END YOUR LIFE?: YES

## 2023-12-22 ASSESSMENT — LIFESTYLE VARIABLES
PRESCIPTION_ABUSE_PAST_12_MONTHS: NO
SUBSTANCE_ABUSE_PAST_12_MONTHS: NO

## 2023-12-22 NOTE — ED PROVIDER NOTES
HPI:  Yolie Moreno is a 63 y.o. female with a history of cirrhosis, bipolar disorder, presents to the emergency department with concern for suicidal ideation.  Patient states that she had a paracentesis done yesterday, that was reportedly uncomplicated however afterwards she started to have right-sided back pain, abdominal pain so severe that she had suicidal ideation.  She endorses she called her doctor stating that her pain was so intolerable that she wanted to kill herself by overdosing on her medications prior to EMS arrival. She denies actually overdosing on her medications. She denies any homicidal ideation. She denies any trauma, falls, or injuries. No passing out. Denies any chest pain, shortness of breath, but does endorse nausea. No headache, dizziness, vision changes, neck pain, vomiting, numbness, tingling, fevers, or chills. She is unsure of last bowel movement.     ------------------------------------------------------------------------------------------------------------------------------------------    Physical Exam:    ED Triage Vitals [12/22/23 1713]   Temp Heart Rate Resp BP   36.9 °C (98.4 °F) 84 16 106/67      SpO2 Temp Source Heart Rate Source Patient Position   99 % Oral Monitor Lying      BP Location FiO2 (%)     Right arm --         Gen: Alert, NAD. Sitting up in bed  Head: normocephalic, atraumatic.   Neck: supple, full ROM intact, no meningeal signs.   Eyes: EOMI, PERRL, anicteric sclerae, noninjected conjunctivae  Nose: Nares patent w/o rhinorrhea  Mouth:  MMM, no OP lesions noted  Heart: RRR, well perfused  Lungs: CTA b/l no RRW, no increased work of breathing  Abdomen: soft, diffusely distended. LLQ with bandage which was removed no underlying skin changes, no drainage or significant ttp. R flank pinpoint area of reproducible ttp without overt CVA ttp. No overlying skin changes.   Musculoskeletal: no joint swelling noted  Extremities: Warm, well perfused. Compartments soft,  nontender   Neurologic: Alert, symmetrical facies, phonates clearly, moves all extremities equally, responsive to touch   Skin: warm, dry   Psychological: calm, endorsing suicidal ideation, denies homicidal ideation.    ------------------------------------------------------------------------------------------------------------------------------------------    Medical Decision Making  63-year-old female with past medical history of cirrhosis, bipolar disorder, presenting to the emergency department primarily with concern for suicidal ideation in the setting of worsening right flank pain/abdominal pain since recent paracentesis done yesterday.  Of note, the paracentesis was done in  the left lower quadrant, and the site itself looks well.  Vital signs on arrival do show soft blood pressure 106/67, no tachycardia, afebrile.  Patient is nontoxic though notably does have distended abdomen.  She remains mentating well, suicide precautions were placed. Will plan for CTA of the abdomen.  CTA does show concern for partially occlusive SMV thrombus, hgb is stable, no leukocytosis. Metabolic panel shows elevated alkp which is downtrending from prior, cr wnl, slight hypokalemia. Patient was found to also have UTI for which she was given a dose of IV ceftriaxone in the ED. Vascular surgery team consulted given the CTA findings and are recommending medicine admission for vascular medicine consult. They did not recommend starting anticoagulation at this time. EPAT did evaluate the patient and and the patient meets inpt psych criteria. Discussed patient with medicine team, will plan for med psych admission. Remains mentating well, afebrile and nontoxic.       Diagnoses as of 12/30/23 1427   Superior mesenteric vein thrombosis (CMS/HCC)        Procedures      Discussion of Management with Other Providers:  vasc sx, gen med       Clinical Impression: SMV thrombus     Dispo: [facility]ADM      Discussed with ED Attending, Dr. Rhonda Jolly  DO [place]   Emergency Medicine, PGY3       Rik Skelton DO  Resident  12/30/23 1432       Liberty Ellis MD  12/30/23 0572

## 2023-12-22 NOTE — ED TRIAGE NOTES
Patient arrives to the ED via [facility] d/t suicidal ideation. Patient originally called emergency with a complaint of ABD pain but then told EMS she tried to overdose on BP meds. Patient denies HI and A/V hallucinations.

## 2023-12-23 PROBLEM — F10.20 ALCOHOLISM (MULTI): Status: ACTIVE | Noted: 2023-12-23

## 2023-12-23 PROBLEM — I10 ESSENTIAL (PRIMARY) HYPERTENSION: Status: ACTIVE | Noted: 2023-08-30

## 2023-12-23 PROBLEM — K70.31 ALCOHOLIC CIRRHOSIS OF LIVER WITH ASCITES (MULTI): Status: ACTIVE | Noted: 2023-12-23

## 2023-12-23 PROBLEM — K55.069 SUPERIOR MESENTERIC VEIN THROMBOSIS (MULTI): Status: ACTIVE | Noted: 2023-12-23

## 2023-12-23 LAB
25(OH)D3 SERPL-MCNC: 8 NG/ML (ref 30–100)
ALBUMIN SERPL BCP-MCNC: 2.7 G/DL (ref 3.4–5)
ALP SERPL-CCNC: 120 U/L (ref 33–136)
ALT SERPL W P-5'-P-CCNC: 42 U/L (ref 7–45)
ANION GAP SERPL CALC-SCNC: 8 MMOL/L (ref 10–20)
ANION GAP SERPL CALC-SCNC: 8 MMOL/L (ref 10–20)
APTT PPP: 188 SECONDS (ref 27–38)
APTT PPP: >200 SECONDS (ref 27–38)
AST SERPL W P-5'-P-CCNC: 64 U/L (ref 9–39)
BILIRUB DIRECT SERPL-MCNC: 0.9 MG/DL (ref 0–0.3)
BILIRUB SERPL-MCNC: 2.3 MG/DL (ref 0–1.2)
BNP SERPL-MCNC: 10 PG/ML (ref 0–99)
BUN SERPL-MCNC: 6 MG/DL (ref 6–23)
BUN SERPL-MCNC: 8 MG/DL (ref 6–23)
CALCIUM SERPL-MCNC: 7.9 MG/DL (ref 8.6–10.6)
CALCIUM SERPL-MCNC: 8 MG/DL (ref 8.6–10.6)
CHLORIDE SERPL-SCNC: 106 MMOL/L (ref 98–107)
CHLORIDE SERPL-SCNC: 106 MMOL/L (ref 98–107)
CO2 SERPL-SCNC: 24 MMOL/L (ref 21–32)
CO2 SERPL-SCNC: 25 MMOL/L (ref 21–32)
CREAT SERPL-MCNC: 0.55 MG/DL (ref 0.5–1.05)
CREAT SERPL-MCNC: 0.62 MG/DL (ref 0.5–1.05)
ERYTHROCYTE [DISTWIDTH] IN BLOOD BY AUTOMATED COUNT: 18.6 % (ref 11.5–14.5)
GFR SERPL CREATININE-BSD FRML MDRD: >90 ML/MIN/1.73M*2
GFR SERPL CREATININE-BSD FRML MDRD: >90 ML/MIN/1.73M*2
GLUCOSE SERPL-MCNC: 113 MG/DL (ref 74–99)
GLUCOSE SERPL-MCNC: 122 MG/DL (ref 74–99)
HCT VFR BLD AUTO: 32.7 % (ref 36–46)
HGB BLD-MCNC: 10.3 G/DL (ref 12–16)
HOLD SPECIMEN: NORMAL
INR PPP: 1.4 (ref 0.9–1.1)
INR PPP: 1.4 (ref 0.9–1.1)
LACTATE SERPL-SCNC: 1.2 MMOL/L (ref 0.4–2)
LACTATE SERPL-SCNC: 1.5 MMOL/L (ref 0.4–2)
MAGNESIUM SERPL-MCNC: 1.98 MG/DL (ref 1.6–2.4)
MCH RBC QN AUTO: 27.6 PG (ref 26–34)
MCHC RBC AUTO-ENTMCNC: 31.5 G/DL (ref 32–36)
MCV RBC AUTO: 88 FL (ref 80–100)
NRBC BLD-RTO: 0 /100 WBCS (ref 0–0)
PHOSPHATE SERPL-MCNC: 2.6 MG/DL (ref 2.5–4.9)
PHOSPHATE SERPL-MCNC: 3 MG/DL (ref 2.5–4.9)
PLATELET # BLD AUTO: 92 X10*3/UL (ref 150–450)
POTASSIUM SERPL-SCNC: 3.9 MMOL/L (ref 3.5–5.3)
POTASSIUM SERPL-SCNC: 4.1 MMOL/L (ref 3.5–5.3)
PROT SERPL-MCNC: 6.6 G/DL (ref 6.4–8.2)
PROTHROMBIN TIME: 15.6 SECONDS (ref 9.8–12.8)
PROTHROMBIN TIME: 15.6 SECONDS (ref 9.8–12.8)
RBC # BLD AUTO: 3.73 X10*6/UL (ref 4–5.2)
SODIUM SERPL-SCNC: 134 MMOL/L (ref 136–145)
SODIUM SERPL-SCNC: 135 MMOL/L (ref 136–145)
TSH SERPL-ACNC: 1.2 MIU/L (ref 0.44–3.98)
UFH PPP CHRO-ACNC: 0.6 IU/ML
UFH PPP CHRO-ACNC: 0.7 IU/ML
UFH PPP CHRO-ACNC: 0.9 IU/ML
VIT B12 SERPL-MCNC: 1195 PG/ML (ref 211–911)
WBC # BLD AUTO: 5.7 X10*3/UL (ref 4.4–11.3)

## 2023-12-23 PROCEDURE — 85610 PROTHROMBIN TIME: CPT | Performed by: NURSE PRACTITIONER

## 2023-12-23 PROCEDURE — 80069 RENAL FUNCTION PANEL: CPT | Mod: CCI | Performed by: NURSE PRACTITIONER

## 2023-12-23 PROCEDURE — 82105 ALPHA-FETOPROTEIN SERUM: CPT | Performed by: STUDENT IN AN ORGANIZED HEALTH CARE EDUCATION/TRAINING PROGRAM

## 2023-12-23 PROCEDURE — 2500000001 HC RX 250 WO HCPCS SELF ADMINISTERED DRUGS (ALT 637 FOR MEDICARE OP): Performed by: NURSE PRACTITIONER

## 2023-12-23 PROCEDURE — 82306 VITAMIN D 25 HYDROXY: CPT | Performed by: NURSE PRACTITIONER

## 2023-12-23 PROCEDURE — 2500000004 HC RX 250 GENERAL PHARMACY W/ HCPCS (ALT 636 FOR OP/ED): Performed by: STUDENT IN AN ORGANIZED HEALTH CARE EDUCATION/TRAINING PROGRAM

## 2023-12-23 PROCEDURE — 85520 HEPARIN ASSAY: CPT | Performed by: NURSE PRACTITIONER

## 2023-12-23 PROCEDURE — 99222 1ST HOSP IP/OBS MODERATE 55: CPT | Performed by: NURSE PRACTITIONER

## 2023-12-23 PROCEDURE — 85730 THROMBOPLASTIN TIME PARTIAL: CPT | Performed by: NURSE PRACTITIONER

## 2023-12-23 PROCEDURE — 85610 PROTHROMBIN TIME: CPT | Performed by: STUDENT IN AN ORGANIZED HEALTH CARE EDUCATION/TRAINING PROGRAM

## 2023-12-23 PROCEDURE — 2500000004 HC RX 250 GENERAL PHARMACY W/ HCPCS (ALT 636 FOR OP/ED): Performed by: NURSE PRACTITIONER

## 2023-12-23 PROCEDURE — 83735 ASSAY OF MAGNESIUM: CPT | Performed by: NURSE PRACTITIONER

## 2023-12-23 PROCEDURE — 1100000001 HC PRIVATE ROOM DAILY

## 2023-12-23 PROCEDURE — 85027 COMPLETE CBC AUTOMATED: CPT | Performed by: NURSE PRACTITIONER

## 2023-12-23 PROCEDURE — 36415 COLL VENOUS BLD VENIPUNCTURE: CPT | Performed by: NURSE PRACTITIONER

## 2023-12-23 PROCEDURE — 99222 1ST HOSP IP/OBS MODERATE 55: CPT | Performed by: PSYCHIATRY & NEUROLOGY

## 2023-12-23 PROCEDURE — 83605 ASSAY OF LACTIC ACID: CPT | Performed by: NURSE PRACTITIONER

## 2023-12-23 PROCEDURE — 82040 ASSAY OF SERUM ALBUMIN: CPT | Performed by: STUDENT IN AN ORGANIZED HEALTH CARE EDUCATION/TRAINING PROGRAM

## 2023-12-23 PROCEDURE — 99223 1ST HOSP IP/OBS HIGH 75: CPT | Performed by: INTERNAL MEDICINE

## 2023-12-23 PROCEDURE — 82607 VITAMIN B-12: CPT | Performed by: NURSE PRACTITIONER

## 2023-12-23 RX ORDER — FUROSEMIDE 40 MG/1
40 TABLET ORAL DAILY
Status: DISCONTINUED | OUTPATIENT
Start: 2023-12-23 | End: 2023-12-24

## 2023-12-23 RX ORDER — HEPARIN SODIUM 5000 [USP'U]/ML
2000-4000 INJECTION, SOLUTION INTRAVENOUS; SUBCUTANEOUS EVERY 4 HOURS PRN
Status: DISCONTINUED | OUTPATIENT
Start: 2023-12-23 | End: 2023-12-26 | Stop reason: HOSPADM

## 2023-12-23 RX ORDER — SPIRONOLACTONE 25 MG/1
50 TABLET ORAL DAILY
Status: DISCONTINUED | OUTPATIENT
Start: 2023-12-23 | End: 2023-12-26 | Stop reason: HOSPADM

## 2023-12-23 RX ORDER — ACETAMINOPHEN 500 MG
5 TABLET ORAL NIGHTLY PRN
Status: DISCONTINUED | OUTPATIENT
Start: 2023-12-23 | End: 2023-12-26 | Stop reason: HOSPADM

## 2023-12-23 RX ORDER — CEFTRIAXONE 1 G/50ML
1 INJECTION, SOLUTION INTRAVENOUS EVERY 24 HOURS
Status: DISCONTINUED | OUTPATIENT
Start: 2023-12-23 | End: 2023-12-26 | Stop reason: HOSPADM

## 2023-12-23 RX ORDER — HEPARIN SODIUM 10000 [USP'U]/100ML
0-4500 INJECTION, SOLUTION INTRAVENOUS CONTINUOUS
Status: DISCONTINUED | OUTPATIENT
Start: 2023-12-23 | End: 2023-12-26 | Stop reason: HOSPADM

## 2023-12-23 RX ORDER — ATORVASTATIN CALCIUM 20 MG/1
20 TABLET, FILM COATED ORAL NIGHTLY
Status: DISCONTINUED | OUTPATIENT
Start: 2023-12-23 | End: 2023-12-26 | Stop reason: HOSPADM

## 2023-12-23 RX ORDER — CARVEDILOL 12.5 MG/1
6.25 TABLET ORAL 2 TIMES DAILY
Status: DISCONTINUED | OUTPATIENT
Start: 2023-12-23 | End: 2023-12-26 | Stop reason: HOSPADM

## 2023-12-23 RX ORDER — HEPARIN SODIUM 5000 [USP'U]/ML
80 INJECTION, SOLUTION INTRAVENOUS; SUBCUTANEOUS ONCE
Status: COMPLETED | OUTPATIENT
Start: 2023-12-23 | End: 2023-12-23

## 2023-12-23 RX ORDER — HYDROMORPHONE HYDROCHLORIDE 1 MG/ML
0.4 INJECTION, SOLUTION INTRAMUSCULAR; INTRAVENOUS; SUBCUTANEOUS ONCE
Status: COMPLETED | OUTPATIENT
Start: 2023-12-23 | End: 2023-12-23

## 2023-12-23 RX ORDER — AMOXICILLIN 250 MG
2 CAPSULE ORAL 2 TIMES DAILY
Status: DISCONTINUED | OUTPATIENT
Start: 2023-12-23 | End: 2023-12-26 | Stop reason: HOSPADM

## 2023-12-23 RX ORDER — ARIPIPRAZOLE 5 MG/1
5 TABLET ORAL DAILY
Status: DISCONTINUED | OUTPATIENT
Start: 2023-12-23 | End: 2023-12-26 | Stop reason: HOSPADM

## 2023-12-23 RX ORDER — FOLIC ACID 1 MG/1
1 TABLET ORAL DAILY
Status: DISCONTINUED | OUTPATIENT
Start: 2023-12-23 | End: 2023-12-26 | Stop reason: HOSPADM

## 2023-12-23 RX ORDER — HYDROMORPHONE HYDROCHLORIDE 1 MG/ML
0.5 INJECTION, SOLUTION INTRAMUSCULAR; INTRAVENOUS; SUBCUTANEOUS ONCE
Status: COMPLETED | OUTPATIENT
Start: 2023-12-23 | End: 2023-12-23

## 2023-12-23 RX ORDER — ACETAMINOPHEN 325 MG/1
650 TABLET ORAL EVERY 8 HOURS PRN
Status: DISCONTINUED | OUTPATIENT
Start: 2023-12-23 | End: 2023-12-26 | Stop reason: HOSPADM

## 2023-12-23 RX ORDER — LANOLIN ALCOHOL/MO/W.PET/CERES
100 CREAM (GRAM) TOPICAL DAILY
Status: DISCONTINUED | OUTPATIENT
Start: 2023-12-23 | End: 2023-12-26 | Stop reason: HOSPADM

## 2023-12-23 RX ORDER — POLYETHYLENE GLYCOL 3350 17 G/17G
17 POWDER, FOR SOLUTION ORAL DAILY PRN
Status: DISCONTINUED | OUTPATIENT
Start: 2023-12-23 | End: 2023-12-26 | Stop reason: HOSPADM

## 2023-12-23 RX ORDER — POTASSIUM CHLORIDE 20 MEQ/1
40 TABLET, EXTENDED RELEASE ORAL ONCE
Status: COMPLETED | OUTPATIENT
Start: 2023-12-23 | End: 2023-12-23

## 2023-12-23 RX ADMIN — FUROSEMIDE 40 MG: 40 TABLET ORAL at 09:51

## 2023-12-23 RX ADMIN — SPIRONOLACTONE 50 MG: 25 TABLET, FILM COATED ORAL at 09:52

## 2023-12-23 RX ADMIN — THIAMINE HCL TAB 100 MG 100 MG: 100 TAB at 09:51

## 2023-12-23 RX ADMIN — SENNOSIDES AND DOCUSATE SODIUM 2 TABLET: 8.6; 5 TABLET ORAL at 09:00

## 2023-12-23 RX ADMIN — FOLIC ACID 1 MG: 1 TABLET ORAL at 09:52

## 2023-12-23 RX ADMIN — HEPARIN SODIUM 1000 UNITS/HR: 10000 INJECTION, SOLUTION INTRAVENOUS at 21:10

## 2023-12-23 RX ADMIN — ARIPIPRAZOLE 5 MG: 5 TABLET ORAL at 09:51

## 2023-12-23 RX ADMIN — ATORVASTATIN CALCIUM 20 MG: 20 TABLET, FILM COATED ORAL at 21:10

## 2023-12-23 RX ADMIN — HEPARIN SODIUM 1100 UNITS/HR: 10000 INJECTION, SOLUTION INTRAVENOUS at 03:00

## 2023-12-23 RX ADMIN — CARVEDILOL 6.25 MG: 12.5 TABLET, FILM COATED ORAL at 09:51

## 2023-12-23 RX ADMIN — CEFTRIAXONE SODIUM 1 G: 1 INJECTION, SOLUTION INTRAVENOUS at 21:10

## 2023-12-23 RX ADMIN — SENNOSIDES AND DOCUSATE SODIUM 2 TABLET: 8.6; 5 TABLET ORAL at 21:10

## 2023-12-23 RX ADMIN — HYDROMORPHONE HYDROCHLORIDE 0.5 MG: 1 INJECTION, SOLUTION INTRAMUSCULAR; INTRAVENOUS; SUBCUTANEOUS at 01:18

## 2023-12-23 RX ADMIN — HYDROMORPHONE HYDROCHLORIDE 0.4 MG: 1 INJECTION, SOLUTION INTRAMUSCULAR; INTRAVENOUS; SUBCUTANEOUS at 03:12

## 2023-12-23 RX ADMIN — HEPARIN SODIUM 5000 UNITS: 5000 INJECTION, SOLUTION INTRAVENOUS; SUBCUTANEOUS at 01:15

## 2023-12-23 RX ADMIN — POTASSIUM CHLORIDE 40 MEQ: 1500 TABLET, EXTENDED RELEASE ORAL at 01:17

## 2023-12-23 SDOH — SOCIAL STABILITY: SOCIAL INSECURITY: ARE THERE ANY APPARENT SIGNS OF INJURIES/BEHAVIORS THAT COULD BE RELATED TO ABUSE/NEGLECT?: NO

## 2023-12-23 SDOH — SOCIAL STABILITY: SOCIAL INSECURITY: ARE YOU OR HAVE YOU BEEN THREATENED OR ABUSED PHYSICALLY, EMOTIONALLY, OR SEXUALLY BY ANYONE?: NO

## 2023-12-23 SDOH — SOCIAL STABILITY: SOCIAL INSECURITY: ABUSE: ADULT

## 2023-12-23 SDOH — SOCIAL STABILITY: SOCIAL INSECURITY: HAS ANYONE EVER THREATENED TO HURT YOUR FAMILY OR YOUR PETS?: NO

## 2023-12-23 SDOH — SOCIAL STABILITY: SOCIAL INSECURITY: DO YOU FEEL ANYONE HAS EXPLOITED OR TAKEN ADVANTAGE OF YOU FINANCIALLY OR OF YOUR PERSONAL PROPERTY?: NO

## 2023-12-23 SDOH — SOCIAL STABILITY: SOCIAL INSECURITY: DO YOU FEEL UNSAFE GOING BACK TO THE PLACE WHERE YOU ARE LIVING?: NO

## 2023-12-23 SDOH — SOCIAL STABILITY: SOCIAL INSECURITY: DOES ANYONE TRY TO KEEP YOU FROM HAVING/CONTACTING OTHER FRIENDS OR DOING THINGS OUTSIDE YOUR HOME?: NO

## 2023-12-23 SDOH — SOCIAL STABILITY: SOCIAL INSECURITY: HAVE YOU HAD THOUGHTS OF HARMING ANYONE ELSE?: NO

## 2023-12-23 ASSESSMENT — ACTIVITIES OF DAILY LIVING (ADL)
JUDGMENT_ADEQUATE_SAFELY_COMPLETE_DAILY_ACTIVITIES: YES
ADEQUATE_TO_COMPLETE_ADL: YES
TOILETING: INDEPENDENT
DRESSING YOURSELF: INDEPENDENT
GROOMING: INDEPENDENT
PATIENT'S MEMORY ADEQUATE TO SAFELY COMPLETE DAILY ACTIVITIES?: YES
HEARING - LEFT EAR: FUNCTIONAL
LACK_OF_TRANSPORTATION: NO
WALKS IN HOME: INDEPENDENT
HEARING - RIGHT EAR: FUNCTIONAL
FEEDING YOURSELF: INDEPENDENT
BATHING: INDEPENDENT

## 2023-12-23 ASSESSMENT — COGNITIVE AND FUNCTIONAL STATUS - GENERAL
DAILY ACTIVITIY SCORE: 24
PATIENT BASELINE BEDBOUND: NO
MOBILITY SCORE: 24
MOBILITY SCORE: 24
DAILY ACTIVITIY SCORE: 24

## 2023-12-23 ASSESSMENT — COLUMBIA-SUICIDE SEVERITY RATING SCALE - C-SSRS
6. HAVE YOU EVER DONE ANYTHING, STARTED TO DO ANYTHING, OR PREPARED TO DO ANYTHING TO END YOUR LIFE?: YES
6. HAVE YOU EVER DONE ANYTHING, STARTED TO DO ANYTHING, OR PREPARED TO DO ANYTHING TO END YOUR LIFE?: YES
4. HAVE YOU HAD THESE THOUGHTS AND HAD SOME INTENTION OF ACTING ON THEM?: NO
5. HAVE YOU STARTED TO WORK OUT OR WORKED OUT THE DETAILS OF HOW TO KILL YOURSELF? DO YOU INTEND TO CARRY OUT THIS PLAN?: YES
1. IN THE PAST MONTH, HAVE YOU WISHED YOU WERE DEAD OR WISHED YOU COULD GO TO SLEEP AND NOT WAKE UP?: YES
2. HAVE YOU ACTUALLY HAD ANY THOUGHTS OF KILLING YOURSELF?: YES

## 2023-12-23 ASSESSMENT — ENCOUNTER SYMPTOMS
RHINORRHEA: 0
FEVER: 0
HEMATURIA: 0
NUMBNESS: 0
HEADACHES: 0
LIGHT-HEADEDNESS: 0
EYES NEGATIVE: 1
WHEEZING: 0
TROUBLE SWALLOWING: 0
AGITATION: 0
FATIGUE: 0
COUGH: 0
DIZZINESS: 0
FREQUENCY: 1
CHILLS: 0
DYSURIA: 0
SLEEP DISTURBANCE: 0
FLANK PAIN: 1
NERVOUS/ANXIOUS: 0
ABDOMINAL PAIN: 1
DIFFICULTY URINATING: 0
SHORTNESS OF BREATH: 0
ENDOCRINE NEGATIVE: 1
CONFUSION: 0
DIARRHEA: 0
NAUSEA: 0
VOMITING: 0
SORE THROAT: 0

## 2023-12-23 ASSESSMENT — PAIN SCALES - GENERAL
PAINLEVEL_OUTOF10: 10 - WORST POSSIBLE PAIN
PAINLEVEL_OUTOF10: 0 - NO PAIN
PAINLEVEL_OUTOF10: 10 - WORST POSSIBLE PAIN
PAINLEVEL_OUTOF10: 2
PAINLEVEL_OUTOF10: 5 - MODERATE PAIN

## 2023-12-23 ASSESSMENT — LIFESTYLE VARIABLES
HOW OFTEN DO YOU HAVE A DRINK CONTAINING ALCOHOL: NEVER
SKIP TO QUESTIONS 9-10: 1
AUDIT-C TOTAL SCORE: 0
HOW MANY STANDARD DRINKS CONTAINING ALCOHOL DO YOU HAVE ON A TYPICAL DAY: PATIENT DOES NOT DRINK
AUDIT-C TOTAL SCORE: 0
HOW OFTEN DO YOU HAVE 6 OR MORE DRINKS ON ONE OCCASION: NEVER

## 2023-12-23 ASSESSMENT — PAIN - FUNCTIONAL ASSESSMENT
PAIN_FUNCTIONAL_ASSESSMENT: 0-10

## 2023-12-23 ASSESSMENT — PAIN DESCRIPTION - LOCATION: LOCATION: ABDOMEN

## 2023-12-23 ASSESSMENT — PATIENT HEALTH QUESTIONNAIRE - PHQ9
2. FEELING DOWN, DEPRESSED OR HOPELESS: NOT AT ALL
1. LITTLE INTEREST OR PLEASURE IN DOING THINGS: SEVERAL DAYS
SUM OF ALL RESPONSES TO PHQ9 QUESTIONS 1 & 2: 1

## 2023-12-23 ASSESSMENT — PAIN DESCRIPTION - ORIENTATION: ORIENTATION: RIGHT;LEFT

## 2023-12-23 NOTE — H&P (VIEW-ONLY)
"History Of Present Illness  Yolie Moreno is a 63 y.o. female with a past medical history of Hep C, alcoholic cirrhosis c/b ascites, ETOH abuse, bipolar II disorder with psychotic features, MDD, dysplipidemia, and esophageal varices. Pt presented to the ED for further evaluation of 10/10 right flank pain that started at noon on 12/22 and SI. Pt reports that she gets scheduled paracentesis outpatient and last one was performed yesterday. 6300 mL was removed and pt received 25g of albumin after paracentesis on 12/21. Pt reports that she usually has no issues after paracentesis is done. Pt called her PCP d/t intolerable pain and told her PCP that she felt suicidal due to pain. Pt reports that she planned on overdosing on her BP medications. PCP called 911 after speaking with pt. Pt also called her daughter and told her that she was contemplating suicide with plan to overdose on her BP meds. Dtr left work early after speaking with pt but when she arrived to home of pt EMS was there to take pt to hospital. She reports that pain improves with movement and walking but pain gets worse when she lies down. Pt did not take any medications at home prior to ED presentation. Pt denied SI upon arrival to ED and pt told ED provider that she realizes suicide will not solve anything. Pt stated, \"I am just in pain.\" Pt also reports that she is not complaint with her home dose of Abilify because it makes her too sleepy. EPAT consulted by ED provider and pt meets criteria for inpatient criteria on Med psych unit.     Last admit to inpatient psych was 8/29-8/30 d/t paranoia/hallucinations. Labs obtained on admit notable for hypokalemia, UTI, elevated LFTs  -, and hypocalcemia. ETOH level 18 on admit. Utox negative. Imaging obtained while in the ED showed superior mesenteric vein thromobosis and persistent large volume ascites. Vascular surgery was consulted by ED provider due to findings seen on imaging. Pt seen by consulting team " while in the ED and no surgical intervention is planned at this time. Consulting team recommended admission to medicine team with vascular medicine consult for AC recs. IV antibiotics (Rocephin) initiated while pt in the ED. Suicide and elopement precautions initiated while in ED. Pt  ordered for pt safety. Heparin gtt initiated while in ED. Pt denied SI when seen by writer on HH3. Pt is still experiencing severe right flank pain. She denies hematuria, dysuria, pelvic pain, and suprapubic tenderness. She does endorse urinary frequency and dark colored urine. Pt denied SI when seen by writer. Pt will be admitted to medicine service for further treatment and management of UTI, SMV thrombosis, and SI.      Past Medical History  Past Medical History:   Diagnosis Date    Alcohol abuse     Bipolar II disorder (CMS/HCC)     Cirrhosis of liver (CMS/HCC)     Cocaine abuse (CMS/HCC)     Esophageal varices (CMS/HCC)     Hepatitis C     Hydronephrosis     MDD (major depressive disorder)     Trichomonal vulvovaginitis 07/30/2022    Trichomonas vaginitis       Surgical History  Past Surgical History:   Procedure Laterality Date    CHOLECYSTECTOMY  11/12/2014    Cholecystectomy    US GUIDED ABDOMINAL PARACENTESIS  6/14/2023    [place] GUIDED ABDOMINAL PARACENTES[place] [facility] GUIDED ABDOMINAL PARACENTESIS  7/3/2023    US GUIDED ABDOMINAL PARACENTESIS 7[place]/2023 [facility] [place]    US GUIDED ABDOMINAL PARACENTESIS  9/7/2023    US GUIDED ABDOMINAL PARACENTESIS 9[place]/2023 [facility]    US GUIDED ABDOMINAL PARACENTESIS  9/28/2023    US GUIDED ABDOMINAL PARACENTESIS 9[place]28/2023 [facility] [place]    US GUIDED ABDOMINAL PARACENTESIS  10/19/2023    US GUIDED ABDOMINAL PARACENTESIS 10[place]19/2023 [facility] [place]    US GUIDED ABDOMINAL PARACENTESIS  11/9/2023    US GUIDED ABDOMINAL PARACENTESIS 11/9/2023 Carlos Chan, APRN-CNP [facility] [place]    US GUIDED ABDOMINAL PARACENTESIS  11/30/2023    US GUIDED ABDOMINAL PARACENTESIS [place]/2023 [facility] [place]    [place] GUIDED ABDOMINAL  PARACENTESIS  12/21/2023     GUIDED ABDOMINAL PARACENTESIS 12/21/2023 [facility]        Social History  She reports that she has never smoked. She has never used smokeless tobacco. She reports that she does not currently use alcohol. She reports that she does not currently use drugs.    Family History  Family History   Problem Relation Name Age of Onset    Diabetes type II Father          Allergies  Patient has no known allergies.    Review of Systems   Constitutional:  Negative for chills, fatigue and fever.   HENT:  Negative for congestion, rhinorrhea, sore throat and trouble swallowing.    Eyes: Negative.    Respiratory:  Negative for cough, shortness of breath and wheezing.    Cardiovascular:  Positive for leg swelling. Negative for chest pain.   Gastrointestinal:  Positive for abdominal pain. Negative for diarrhea, nausea and vomiting.   Endocrine: Negative.    Genitourinary:  Positive for flank pain, frequency and urgency. Negative for difficulty urinating, dysuria and hematuria.   Skin: Negative.    Neurological:  Negative for dizziness, light-headedness, numbness and headaches.   Psychiatric/Behavioral:  Positive for suicidal ideas. Negative for agitation, confusion and sleep disturbance. The patient is not nervous/anxious.         Physical Exam  Vitals reviewed.   Constitutional:       General: She is not in acute distress.     Appearance: Normal appearance. She is not ill-appearing.   HENT:      Head: Normocephalic and atraumatic.      Nose: Nose normal.      Mouth/Throat:      Mouth: Mucous membranes are moist.   Eyes:      Extraocular Movements: Extraocular movements intact.      Conjunctiva/sclera: Conjunctivae normal.   Cardiovascular:      Rate and Rhythm: Normal rate.      Pulses: Normal pulses.      Heart sounds: Normal heart sounds.   Abdominal:      General: Bowel sounds are normal. There is distension.      Palpations: Abdomen is soft.      Tenderness: There is right CVA tenderness. There is no  "guarding.   Musculoskeletal:      Cervical back: Normal range of motion and neck supple.      Comments: Trace BLE edema   Skin:     General: Skin is warm and dry.   Neurological:      Mental Status: She is alert and oriented to person, place, and time. Mental status is at baseline.   Psychiatric:         Mood and Affect: Mood normal.          Last Recorded Vitals  Blood pressure 112/80, pulse 98, temperature 37.2 °C (98.9 °F), temperature source Oral, resp. rate 17, height 1.651 m (5' 5\"), weight 63.5 kg (140 lb), SpO2 100 %.    Relevant Results  CT abdomen pelvis w IV contrast    Result Date: 12/22/2023  Interpreted By:  Deepika Young  and Ursula Lovett STUDY: CT ABDOMEN PELVIS W IV CONTRAST;  12/22/2023 8:49 pm   INDICATION: Signs/Symptoms:severe R flank pain, recent paracentesis, hx cirrhosis.   COMPARISON: CT chest abdomen pelvis 07/02/2023   ACCESSION NUMBER(S): [id number]   ORDERING CLINICIAN: YUNIOR PRICE   TECHNIQUE: Contiguous axial images of the abdomen and pelvis were obtained after the intravenous administration of 80 mL Omnipaque 350 contrast. Coronal and sagittal reformatted images were reconstructed from the axial data.   FINDINGS: LOWER CHEST: No acute abnormality.     ABDOMEN/PELVIS:   ABDOMINAL WALL: No significant abnormality.   LIVER: The liver is shrunken with nodular contour. No focal lesion identified.   BILE DUCTS: There is left-greater-than-right intrahepatic biliary duct dilatation, similar to prior. Dilated common bile duct measuring up to 11 mm. There common bile duct again demonstrates internal intermediate density material.   GALLBLADDER: Surgically absent.   PANCREAS: Stable 12 mm hypodense lesion in the pancreatic uncinate process.   SPLEEN: Similar enlargement of the spleen measuring up to 13.6 cm in craniocaudal dimension. No focal lesion.   ADRENALS: No significant abnormality.   KIDNEYS, URETERS, BLADDER: The kidneys are symmetric in size and enhancement. There is chronic " marked enlargement of the left renal pelvis without significant hydronephrosis or hydroureter. No visualized urinary tract calculus. The urinary bladder is within normal limits for degree of distention.   REPRODUCTIVE ORGANS: There is similar multi-cystic enlargement of ovaries, left-greater-than-right. A 4.3 x 3.1 cm circumscribed hyperdense structure is again noted in the right anterior uterine fundus in keeping with suspected leiomyoma.   VESSELS: Recanalized umbilical vein. There is a new nonocclusive filling defect along the posterior and left lateral wall of the upper aspect of the superior mesenteric vein (series 201, images 50-53).   RETROPERITONEUM/LYMPH NODES: No enlarged lymph nodes. No acute retroperitoneal abnormality. No retroperitoneal hematoma.   BOWEL/MESENTERY/PERITONEUM: The stomach is within normal limits. No inflammatory bowel wall thickening or dilatation. The appendix is within normal limits.   Persistent large volume ascites. No focal fluid collection. No pneumoperitoneum.     MUSCULOSKELETAL: No acute osseous abnormality. No suspicious osseous lesions.       No acute abdominopelvic process.   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   Hepatic cirrhosis with similar findings of portal hypertension including large volume ascites, splenomegaly, and recanalized umbilical vein.   Enlarged ovaries with multiple cysts, grossly stable from 07/02/2023, however new from 05/26/2022. Further evaluation nonemergent pelvic ultrasound is recommended, if not already performed.   Stable biliary duct dilatation with intermediate density debris within the common bile duct. Choledocholithiasis not excluded. Please correlate clinically for biliary obstruction and consider further evaluation with ERCP or MRCP if clinically indicated.   I personally reviewed the images/study and I agree with the findings as stated by Rachell Vergara MD. This study was interpreted at  [place], [place].   MACRO: None.   Signed by: Deepika oYung 12/22/2023 10:45 PM Dictation workstation:   [id number]    US guided abdominal paracentesis    Result Date: 12/21/2023  Interpreted By:  Deanna Turner, STUDY: US GUIDED ABDOMINAL PARACENTESIS; 12/21/202312:02 pm   INDICATION: Signs/Symptoms:ascites.   COMPARISON: None.   ACCESSION NUMBER(S): [id number]   ORDERING CLINICIAN: DEANNA TURNER   TECHNIQUE: INTERVENTIONALIST(S): Deanna Turner   CONSENT: The patient/patient's POA/next of kin was informed of the nature of the proposed procedure. The purposes, alternatives, risks, and benefits were explained and discussed. All questions were answered and consent was obtained.   SEDATION: None   MEDICATION/CONTRAST: 25 Grams albumin IV was ordered following procedure.   TIME OUT: A time out was performed immediately prior to procedure start with the interventional team, correctly identifying the patient name, date of birth, MRN, procedure, anatomy (including marking of site and side), patient position, procedure consent form, relevant laboratory and imaging test results, antibiotic administration, safety precautions, and procedure-specific equipment needs.   FINDINGS: The patient was placed in the supine position.   The abdominal space was examined with grey scale ultrasound, and the most accessible fluid identified and marked for paracentesis.   The skin was prepped and draped in usual manner. Local anesthesia with Lidocaine was administered and a left-sided paracentesis was performed.  A 5 [place] One-Step paracentesis needle/catheter was then placed where marked.  Approximately 6300 mL of yellowish colored fluid was removed.  The needle/catheter was then withdrawn.   The patient tolerated the procedure well and there were no immediate complications.       Uneventful paracentesis, as detailed above. Left Hemiabdomen, 6300 mL   I personally performed and/or  directly supervised this study and was present for the entire procedure.   Performed and dictated at [facility].   Signed by: Carlos Chan 12/21/2023 12:02 PM Dictation workstation:   [id number]      Results for orders placed or performed during the hospital encounter of 12/22/23 (from the past 24 hour(s))   CBC and Auto Differential   Result Value Ref Range    WBC 6.0 4.4 - 11.3 x10*3/uL    nRBC 0.0 0.0 - 0.0 /100 WBCs    RBC 3.97 (L) 4.00 - 5.20 x10*6/uL    Hemoglobin 11.3 (L) 12.0 - 16.0 g/dL    Hematocrit 33.5 (L) 36.0 - 46.0 %    MCV 84 80 - 100 fL    MCH 28.5 26.0 - 34.0 pg    MCHC 33.7 32.0 - 36.0 g/dL    RDW 18.5 (H) 11.5 - 14.5 %    Platelets 98 (L) 150 - 450 x10*3/uL    Neutrophils % 52.3 40.0 - 80.0 %    Immature Granulocytes %, Automated 0.3 0.0 - 0.9 %    Lymphocytes % 21.3 13.0 - 44.0 %    Monocytes % 23.8 2.0 - 10.0 %    Eosinophils % 1.3 0.0 - 6.0 %    Basophils % 1.0 0.0 - 2.0 %    Neutrophils Absolute 3.14 1.20 - 7.70 x10*3/uL    Immature Granulocytes Absolute, Automated 0.02 0.00 - 0.70 x10*3/uL    Lymphocytes Absolute 1.28 1.20 - 4.80 x10*3/uL    Monocytes Absolute 1.43 (H) 0.10 - 1.00 x10*3/uL    Eosinophils Absolute 0.08 0.00 - 0.70 x10*3/uL    Basophils Absolute 0.06 0.00 - 0.10 x10*3/uL   Comprehensive metabolic panel   Result Value Ref Range    Glucose 109 (H) 74 - 99 mg/dL    Sodium 136 136 - 145 mmol/L    Potassium 3.4 (L) 3.5 - 5.3 mmol/L    Chloride 107 98 - 107 mmol/L    Bicarbonate 22 21 - 32 mmol/L    Anion Gap 10 10 - 20 mmol/L    Urea Nitrogen 6 6 - 23 mg/dL    Creatinine 0.61 0.50 - 1.05 mg/dL    eGFR >90 >60 mL/min/1.73m*2    Calcium 8.3 (L) 8.6 - 10.6 mg/dL    Albumin 2.9 (L) 3.4 - 5.0 g/dL    Alkaline Phosphatase 142 (H) 33 - 136 U/L    Total Protein 7.3 6.4 - 8.2 g/dL    AST 78 (H) 9 - 39 U/L    Bilirubin, Total 1.6 (H) 0.0 - 1.2 mg/dL    ALT 42 7 - 45 U/L   Acute Toxicology Panel, Blood   Result Value Ref Range    Acetaminophen <10.0  10.0 - 30.0 ug/mL    Salicylate  <3 4 - 20 mg/dL    Alcohol 18 (H) <=10 mg/dL   Magnesium   Result Value Ref Range    Magnesium 2.10 1.60 - 2.40 mg/dL   Coagulation Screen   Result Value Ref Range    Protime 14.1 (H) 9.8 - 12.8 seconds    INR 1.3 (H) 0.9 - 1.1    aPTT 33 27 - 38 seconds   Type and Screen   Result Value Ref Range    ABO TYPE A     Rh TYPE POS     ANTIBODY SCREEN NEG    Sars-CoV-2 PCR, Screen Asymptomatic   Result Value Ref Range    Coronavirus 2019, PCR Not Detected Not Detected   Drug Screen, Urine   Result Value Ref Range    Amphetamine Screen, Urine Presumptive Negative Presumptive Negative    Barbiturate Screen, Urine Presumptive Negative Presumptive Negative    Benzodiazepines Screen, Urine Presumptive Negative Presumptive Negative    Cannabinoid Screen, Urine Presumptive Negative Presumptive Negative    Cocaine Metabolite Screen, Urine Presumptive Negative Presumptive Negative    Fentanyl Screen, Urine Presumptive Negative Presumptive Negative    Opiate Screen, Urine Presumptive Negative Presumptive Negative    Oxycodone Screen, Urine Presumptive Negative Presumptive Negative    PCP Screen, Urine Presumptive Negative Presumptive Negative   Urinalysis with Reflex Culture and Microscopic   Result Value Ref Range    Color, Urine Yellow Straw, Yellow    Appearance, Urine Clear Clear    Specific Gravity, Urine 1.036 (N) 1.005 - 1.035    pH, Urine 5.0 5.0, 5.5, 6.0, 6.5, 7.0, 7.5, 8.0    Protein, Urine NEGATIVE NEGATIVE mg/dL    Glucose, Urine NEGATIVE NEGATIVE mg/dL    Blood, Urine NEGATIVE NEGATIVE    Ketones, Urine NEGATIVE NEGATIVE mg/dL    Bilirubin, Urine NEGATIVE NEGATIVE    Urobilinogen, Urine 4.0 (N) <2.0 mg/dL    Nitrite, Urine POSITIVE (A) NEGATIVE    Leukocyte Esterase, Urine MODERATE (2+) (A) NEGATIVE   Microscopic Only, Urine   Result Value Ref Range    WBC, Urine 21-50 (A) 1-5, NONE /HPF    RBC, Urine 1-2 NONE, 1-2, 3-5 /HPF    Squamous Epithelial Cells, Urine 1-9 (SPARSE) Reference  range not established. /HPF    Mucus, Urine 1+ Reference range not established. /LPF   POCT pregnancy, urine   Result Value Ref Range    Preg Test, Ur Negative Negative       Scheduled medications  ARIPiprazole, 5 mg, oral, Daily  atorvastatin, 20 mg, oral, Nightly  carvedilol, 6.25 mg, oral, BID  cefTRIAXone, 1 g, intravenous, q24h  folic acid, 1 mg, oral, Daily  furosemide, 40 mg, oral, Daily  heparin, 80 Units/kg, intravenous, Once  lidocaine, 1 patch, transdermal, Once  sennosides-docusate sodium, 2 tablet, oral, BID  spironolactone, 50 mg, oral, Daily  thiamine, 100 mg, oral, Daily      Continuous medications  heparin, 0-4,500 Units/hr      PRN medications  PRN medications: acetaminophen, heparin, melatonin, polyethylene glycol    Assessment/Plan   Principal Problem:    Superior mesenteric vein thrombosis (CMS/HCC)    Yolie Moreno is 63 yr old female with a PMH of Hep C, alcoholic cirrhosis c/b ascites, ETOH abuse, bipolar II disorder with psychotic features, MDD, dysplipidemia, and esophageal varices. Pt admitted to medicine service for further treatment and management of UTI, SMV thrombosis, and SI. Vascular medicine consult needed.    #suicidal ideation  #bipolar II disorder  - suicide and elopement precautions in place  - EPAT consulted and pt meets criteria for inpatient psych admission. Plan to transfer pt to med psych unit later today  - patient safety tray ordered  - sitter ordered for pt safety  - continue home dose of Abilify 5 mg PO every day (consider switching to alternate medication since pt does not like feeling too sleepy while on this med)    #superior mesenteric vein thrombosis  - consult vascular medicine for AC recs  - Heparin gtt initiated   - monitor pt for s/s of acute mesenteric ischemia (worsening abdominal pain out of proportion to physical exam, n/v/d), trend lactate (AM draw and PM draw ordered), and RFP (ordered for AM draw and PM draw)  - Lidocaine patch to R flank  - Tylenol  650 mg q8h PRN     #UTI  - UA: +2 leukocyte esterase, +nitrite, 21-50 WBCs, 1-2 RBC Urine culture: Pending results with sensitivities   - Prior Urine culture result (6/25/23): E.coli >100,000 CFU/ml sensitive to Ampicillin, Ancef, Rocephin, Zosyn, Bactrim DS, Levaquin, Gentamicin, and Cipro  - Antibiotic Regimen: Rocephin 1g Q24hr (12/22-). Transition to PO antibiotic at discharge   - Monitor kidney function; Provide maintenance fluids if needed; Encourage oral hydration     #hypokalemia  - K 3.4 on admit, potassium chloride 40 mEQ x1 dose ordered  - likely d/t diuretic use outpatient  - RFP in AM    #anemia (stable)  - Hgb 11.3 on admit, Hgb 11.5 on 12/7  - continue to monitor CBC    #Alcoholic cirrhosis c/b recurrent ascites  #Hep C  #elevated LFTs (stable)  - AST 78 Alk P 142 total bili 1.6 on admit, AST 85 Alk P 170 total bili 1.9 on 12/7  - consider RUQ ultrasound if LFTs start to uptrend  - last paracentesis 12/21: 6300 ml removed  - MELD score: 11  - continue home dose of Lasix 40 mg PO every day and Aldactone 50 mg PO every day  - 2g Na diet, 2L FR  - daily weight  - Hep C + 5/2022, viral load undetectable  - HCV viral load undetectable 5/2023     #HTN  - last /80  - continue to monitor BP  - continue home dose of Coreg 6.25 mg PO BID (hold for SBP<110 or HR<60)    #dyslipidemia  - continue home dose of Lipitor 20 mg PO at bedtime    #ETOH abuse  #cocaine abuse  - Ethanol level 18 on admit  - Utox  negative  - Utox +cocaine on 8/29/83  - continue home dose of Thiamine 100 mg PO every day and Folic acid 1 mg PO QD    Dispo: admit to [facility]. Plan per above      I spent 60 minutes in the professional and overall care of this patient.      Sepideh Frias, APRN-CNP

## 2023-12-23 NOTE — PROGRESS NOTES
Emergency Medicine Transition of Care Note.    I assumed care of Yolie Moreno at signout.  Please see the previous ED provider note for all HPI, PE and MDM prior to my arrival. This is in addition to the primary record.    In brief Yolie Moreno is an 63 y.o. female presenting for   Chief Complaint   Patient presents with    Suicidal     At the time of signout we were awaiting: Patient with acute flank pain after therapeutic paracentesis yesterday.  Also endorsing suicidal ideation for which EPAT recommended inpatient psychiatric hospitalization.  Given her multiple comorbidities, patient was admitted to medicine with plan to transfer to med psych in the a.m.          Procedure  Procedures    Jose A Peter, DO

## 2023-12-23 NOTE — PROGRESS NOTES
EPAT - Social Work Psychiatric Assessment    Arrival Details  Mode of Arrival: Ambulance  Admission Source: Emergency department  Admission Type: Voluntary  EPAT Assessment Start Date: 12/22/23  EPAT Assessment Start Time: 1945  Name of : Franchesca Rick M.Ed., [facility]    History of Present Illness    Admission Reason: Suicidal ideation    HPI: The patient is a 63 yr old female with a history of Major Depressive Disorder and Bipolar Disorder. She presents in the ED with concerns of suicidal ideation. According to ED notes, the patient initially came into the ED via EMS after calling her PCP with SI and indicating she was in pain. The PCP called EMS. When EMS arrived, the patient told EMS that she had attempted to overdose on her BP medication, however, she said she did not swallow any pills. When she got to the ED, she denied any SI,HI,AH,VH. The patient reports that she does not take her Abilify because it makes her sleepy. The patient scored high risk for suicide on the C-SSRS when assessed in the ED. The patient was then referred to EPAT for a psychiatric evaluation.  Patient history was reviewed before EPAT evaluation.       Psychiatric Diagnosis History: Bipolar, Major Depressive Disorder      Psychiatric Medication/Treatment History: Abilify 5 mg tab 1X/daily. Patient has not been admitted for psychiatric reasons in over 10 years. 10 years ago, patient attempted suicide by cutting wrist.    SW Readmission Information   Readmission within 30 Days: No    Psychiatric Symptoms  Anxiety Symptoms: No problems reported or observed.  Depression Symptoms: Feelings of helplessness, Feelings of hopelessess, Increased irritability, Isolative, Loss of interest, Sleep disturbance  Mariely Symptoms: No problems reported or observed.    Psychosis Symptoms  Hallucination Type: No problems reported or observed.  Delusion Type: No problems reported or observed.    Additional Symptoms - Adult  Generalized Anxiety Disorder: No  problems reported or observed.  Obsessive Compulsive Disorder: No problems reported or observed.  Panic Attack: No problems reported or observed.  Post Traumatic Stress Disorder: No problems reported or observed.  Delirium: No problems reported or observed.  Review of Symptoms Comments: The patient has been struggling with cirrohsis of the liver and has been experiencing increased depressive symptoms with SI.    Past Psychiatric History/Meds/Treatments  Past Psychiatric History: Bipolar, Major Depressive Disorder  Past Psychiatric Meds/Treatments: Abilify 5 mg tab 1X/daily. Patient has not been admitted for psychiatric reasons in over 10 years. 10 years ago, patient attempted suicide by cutting wrist.  Past Violence/Victimization History: None reported    Current Mental Health Contacts   Name/Phone Number: No KELLY   Last Appointment Date: N/A  Provider Name/Phone Number: Faby Wakefield MD/ [phone]  Provider Last Appointment Date: 12/21/2023    Support System: Immediate family (Daughters)    Living Arrangement: Apartment, Lives alone    Home Safety  Feels Safe Living in Home: Yes  Potentially Unsafe Housing Conditions: Unable to Assess    Income Information  Employment Status for: Patient  Employment Status: Unemployed, Disabled  Income Source: Disability  Current/Previous Occupation: Unable to Assess  Financial Concerns: None    [place] Service/Education History  Current or Previous  Service: None  Education Level:  (Not assessed)    Social/Cultural History  Social History: Patient is own guarantor  Cultural Requests During Hospitalization: None  Spiritual Requests During Hospitalization: None  Important Activities: Family    Legal  Legal Considerations: Other (Comment) (None reported)  Criminal Activity/ Legal Involvement Pertinent to Current Situation/ Hospitalization: None    Drug Screening  Have you used any substances (canabis, cocaine, heroin, hallucinogens, inhalants,  etc.) in the past 12 months?: No (Patient was negative for drugs/alcohol. Substance treatment is not necessary at this time)  Have you used any prescription drugs other than prescribed in the past 12 months?: No  Is a toxicology screen needed?: Yes    Stage of Change  Stage of Change:  (Patient was negative for drugs/alcohol. Substance treatment is not necessary at this time)    Psychosocial  Psychosocial (WDL): Within Defined Limits  Behaviors/Mood: Appropriate for situation, Cooperative, Flat affect, Guarded  Affect: Appropriate to circumstances  Parent/Guardian/Significant Other Involvement: No involvement  Family Behaviors: Appropriate for situation, Cooperative, Calm, Supportive  Visitor Behaviors: Unable to assess  Needs Expressed: Emotional  Emotional Support Given: Patient counseling    Orientation  Orientation Level: Oriented X4    General Appearance  Motor Activity: Unremarkable  Speech Pattern: Other (Comment) (Normal speech pattern)  General Attitude: Cooperative, Guarded  Appearance/Hygiene: Unremarkable    Thought Process  Coherency: Other (Comment) (Patient was coherent)  Content: Unremarkable  Delusions: Other (Comment) (No delusions observed)  Perception: Not altered  Hallucination: None  Judgment/Insight: Poor  Confusion: None  Cognition: Appropriate attention/concentration, Follows commands    Sleep Pattern  Sleep Pattern: Sleeps all night    Risk Factors  Self Harm/Suicidal Ideation Plan: Patient was having suicidal thoughts with a plan to overdose on BP pills  Previous Self Harm/Suicidal Plans: Patient reports a suicide attempt 10 years ago by cutting wrist  Risk Factors: Major mental illness, Other (Comment) (Chronic illness)  Description of Thoughts/Ideas Leaving Unit Now: Unable to assess    Violence Risk Assessment  Assessment of Violence: On admission  Thoughts of Harm to Others: No    Ability to Assess Risk Screen  Risk Screen - Ability to Assess: Able to be screened  Ask Suicide-Screening  Questions  1. In the past few weeks, have you wished you were dead?: Yes  2. In the past few weeks, have you felt that you or your family would be better off if you were dead?: Yes  3. In the past week, have you been having thoughts about killing yourself?: Yes  4. Have you ever tried to kill yourself?: Yes  How did you try to kill yourself?: cutting wrist  When did you try to kill yourself?: 10 years ago  5. Are you having thoughts of killing yourself right now?: No  Calculated Risk Score: Potential Risk  [place] Suicide Severity Rating Scale (Screener/Recent Self-Report)  1. Wish to be Dead (Past 1 Month): Yes  2. Non-Specific Active Suicidal Thoughts (Past 1 Month): Yes  3. Active Suicidal Ideation with any Methods (Not Plan) Without Intent to Act (Past 1 Month): Yes  4. Active Suicidal Ideation with Some Intent to Act, Without Specific Plan (Past 1 Month): Yes  5. Active Suicidal Ideation with Specific Plan and Intent (Past 1 Month): Yes  6. Suicidal Behavior (Lifetime): Yes  6. Suicidal Behavior (3 Months): Yes  6. Suicidal Behavior (Description): Patient cut wrist 10 years ago. Today she had blood pressure pills out and was ready to ingest them, but thankfully did not. Patient had suicidal ideation with a clear plan to overdose  Calculated C-SSRS Risk Score (Lifetime/Recent): High Risk  Step 1: Risk Factors  Current & Past Psychiatric Dx: Mood disorder  Presenting Symptoms: Anhedonia, Hopelessness or despair  Family History: Other (Comment) (None reported)  Precipitants/Stressors: Triggering events leading to humiliation, shame, and/or despair (e.g. loss of relationship, financial or health status) (real or anticipated), Chronic physical pain or other acute medical problem (e.g. CNS disorders), Social isolation  Change in Treatment: Hopeless or dissatisfied with provider or treatment  Access to Lethal Methods : No  Step 2: Protective Factors   Protective Factors Internal: Identifies reasons for living  "(Patient said, \"My girls\" keep her going)  Protective Factors External: Supportive social network or family or friends  Step 3: Suicidal Ideation Intensity  Most Severe Suicidal Ideation Identified: Patient had BP pills out and was ready to take them with an intent to end her life.  How Many Times Have You Had These Thoughts: Daily or almost daily  When You Have the Thoughts How Long do They Last : 1-4 hours/a lot of the time  Could/Can You Stop Thinking About Killing Yourself or Wanting to Die if You Want to: Can control thoughts with some difficulty  Are There Things - Anyone or Anything - That Stopped You From Wanting to Die or Acting on: Uncertain that deterrents stopped you  What Sort of Reasons Did You Have For Thinking About Wanting to Die or Killing Yourself: Mostly to end or stop the pain (you couldn't go on living with the pain or how you were feeling)  Total Score: 17  Step 5: Documentation  Risk Level: High suicide risk    Psychiatric Impression and Plan of Care    Assessment and Plan: The patient is a 63 yr old AA female with a history of Bipolar disorder and Major Depressive Disorder. She presents with suicidal ideation with a plan to overdose on pills. The patient was calm and cooperative but also guarded throughout the EPAT evaluation.       The patient denies any HI/AH/VH. She endorses suicidal ideation “just for a moment because I am tired of dealing with the pain of this cirrhosis. I’m not suicidal anymore”. She reports that she was thinking about overdosing on her blood pressure pills because she was in pain. The patient reports that she attempted suicide 10 years ago by cutting her wrist and has not had an inpatient psychiatric admission in over 10 years. She does report that she has had multiple inpatient admissions but “that was long ago”. After a few minutes into the psychiatric interview, the patient was more open to answering questions more honestly and reported that she is feeling " depressed and has had suicidal thoughts since she has been dealing with cirrhosis. The patient denies any current drug/alcohol use and drug/alcohol screen was negative. The patient declines substance use treatment. The patient reports that the psychiatric medication she is on is not working. The patient scored high risk for suicide on the C-SSRS when assessed in the ED and when assessed by EPAT.      Collateral: This writer spoke with patient’s daughter who reports that she had left work early to drive to the patient’s house because she said that she “wanted to end her life”. The patient’s daughter reports that the ambulance was there by the time she got there. She reports her mother has voiced to her that she “doesn’t want to live anymore with the cirrhosis”.       After discussing evaluation with the ED provider, it was mutually decided that the patient meets criteria for inpatient psychiatric admission.  Diagnostic Impression: Major Depressive Disorder  Specific Resources Provided to Patient: Inpatient psychiatric admission.    CM Notified: N/A  PHP/IOP Recommended: None  Specific Information Provided for PHP/IOP: None  Plan Comments: None    Outcome/Disposition  Patient's Perception of Outcome Achieved: Unable to assess  Assessment, Recommendations and Risk Level Reviewed with: Liberty Ellis  Contact Name: Princess Moreno  Contact Number(s): [phone]  Contact Relationship: Patient's daughter  EPAT Assessment Completed Date: 12/22/23  EPAT Assessment Completed Time: 2323  Patient Disposition:  Adult Medical (ED provider informed EPAT that patient is most likely headed towards med/psych admission. Will keep EPAT updated.)

## 2023-12-23 NOTE — CONSULTS
"Psychiatry Consult-Liaison Initial Note    Inpatient consult to Psychiatry  Consult performed by: Kirk Anders MD  Consult ordered by: Abel Girard MD  Reason for consult: Si  Assessment/Recommendations: See assessment/recommendations below          HISTORY OF PRESENT ILLNESS:     Yolie Moreno is a 63 y.o. female with PMH of AUD, HCV, Alcoholic liver cirrhosis c/b esophageal varicies and ascites; PPH of Bipolar 2 with psychotic features (last admission >10 years ago). Patient presents to ED for R flank pain and SI. EPAT saw patient and recommended inpatient psychitric admission, however admitted to medicine for SMV thrombosis and UTI. [facility] psychiatry consulted for SI    On chart review, patient is non-adherent with home medications of abilify 5mg. Currently on heparin ggt and ceftriaxone qd with plans to transition to po. ALD with MELD 11, last paracentesis 12/21 w/ HCV viral load undetectable.     On interview, patient seen to be in discomfort given her R flank pain. She reported that yesterday her male friend called EMS given patient with severe R flank pain accompanied by SI. She reported that SI came on that may be related to pain. She reported intent with plans to OD on pills, which she has laying around at home. Over the past month, she reported stable mood, without symptoms of depression or dwayne, denies psychosis, denies HI/AVH. She is non-adherent with Abilify 5mg for over a year, reporting that \"I am control my mind and thoughts\". Currently, she denies symptoms of depression/dwayne/psychosis, denies current SI/HI/AVH. Reportedly pain is better control but still with acute pain. Regarding cessation of SI, she reported \"Well, I do not have it now because I can control my thoughts, and has nothing to do with my pain\". She continues to be rigid at times and irritably, but understandably due to pain.     Regarding PPH, patient unwilling to discuss symptoms of when she decompensates, and " reported that she is on SSD due to mental illness    Past Psychiatric History  Current/Previous Diagnoses:  Bipolar 2 with psychotic features  Current Psychiatrist/Provider: Denies  Current Therapist: Denies  Other Providers / Agencies: Denies  Outpatient Treatment History:  Non adherent with psychiatric medication and not connected with psychiatry  Past Medication Trials:  Abilify, does not remember others  Inpatient Hospitalizations:  7 times, last one >10 years ago  Suicide Attempts: x1 SA via cutting of wrist >10 years ago per chart  Homicide attempts/Violence: Denies  Self Harm/Self Injurious: Denies    Substance Abuse History  Reports chronic history of alcohol use, but abstinent for 17 months after her diagnosis of alcohol liver disease  Denies tobacco, THC, opioids, stimulant, hallucinogens or inhalants     Social History  Household: lives alone  Occupation: unemployed  Weapons at home and access to lethal means: Denies    OARRS REVIEW  OARRS checked: No data recorded   OARRS comments: No record, no red flags     Past Medical History  She has a past medical history of Alcohol abuse, Bipolar II disorder (CMS/HCC), Cirrhosis of liver (CMS/HCC), Cocaine abuse (CMS/HCC), Esophageal varices (CMS/HCC), Hepatitis C, Hydronephrosis, MDD (major depressive disorder), and Trichomonal vulvovaginitis (07/30/2022).    ALLERGIES  Patient has no known allergies.    Surgical History  She has a past surgical history that includes Cholecystectomy (11/12/2014); US guided abdominal paracentesis (6/14/2023); US guided abdominal paracentesis (7/3/2023); US guided abdominal paracentesis (9/7/2023); US guided abdominal paracentesis (9/28/2023); US guided abdominal paracentesis (10/19/2023); US guided abdominal paracentesis (11/9/2023); US guided abdominal paracentesis (11/30/2023); and US guided abdominal paracentesis (12/21/2023).    FAMILY HISTORY  Family History   Problem Relation Name Age of Onset    Diabetes type II Father       "    PSYCHIATRIC REVIEW OF SYSTEMS  Depression: fatigue or loss of energy  Anxiety: negative  Mariely: negative  Psychosis: negative  Delirium: negative   Trauma: negative    OBJECTIVE:     VITALS      12/21/2023     9:02 AM 12/22/2023     5:13 PM 12/22/2023     5:43 PM 12/22/2023     8:02 PM 12/23/2023     1:00 AM 12/23/2023     4:39 AM 12/23/2023     8:08 AM   Vitals   Systolic 113 106 100 112 112 96 104   Diastolic 73 67 64 80 80 64 61   Heart Rate 97 84 86 98 91 91 84   Temp 36.8 °C (98.2 °F) 36.9 °C (98.4 °F)  37.2 °C (98.9 °F) 36.5 °C (97.7 °F) 36.3 °C (97.3 °F) 36.4 °C (97.5 °F)   Resp 18 16 16 17 16 16 16   Height (in)  1.651 m (5' 5\")        Weight (lb)  140        BMI  23.3 kg/m2        BSA (m2)  1.71 m2           Body mass index is 23.3 kg/m².  Facility age limit for growth %randy is 20 years.  Wt Readings from Last 4 Encounters:   12/22/23 63.5 kg (140 lb)   12/07/23 64.9 kg (143 lb)   09/08/23 64 kg (141 lb)   08/01/23 67.2 kg (148 lb 2.4 oz)       Mental Status Exam  General: Mild distress appearing  Appearance: Appeared as age stated; appropriately dressed/groomed.  Attitude: Partially cooperative   Behavior: Fair EC; overall responding appropriately  Motor Activity: No notable norma PMAR  Speech: Clear, with fair phonation, and no lisp nor dysarthria.   Mood: \"In pain\"  Affect: Constricted and irritable   Thought Process: Goal directed, and perseverative on pain and \"I can control my thoughts\"  Thought Content: Denied SI/HI. Not voicing/endorsing delusions.  Thought Perception: Did not appear to be responding to internal stimuli. Not endorsing AVH  Cognition: Grossly intact; A&O x4/4 to self, place, date, and context.  Insight: Fair  Judgement: Fair    HOME MEDICATIONS  Medication Documentation Review Audit       Reviewed by Sepideh Frias APRN-CNP (Nurse Practitioner) on 12/23/23 at 0119      Medication Order Taking? Sig Documenting Provider Last Dose Status   acetaminophen (TylenoL) 325 mg " capsule [id number]  Take 2 capsules (650 mg) by mouth 3 times a day as needed. Elizabeth Fermin MD  Active   ARIPiprazole (Abilify) 5 mg tablet [id number]  Take 1 tablet (5 mg) by mouth once daily. Elizabeth Fermin MD  Active   atorvastatin (Lipitor) 20 mg tablet [id number]  Take 1 tablet (20 mg) by mouth once daily at bedtime. Elizabeth Fermin MD  Active   carvedilol (Coreg) 6.25 mg tablet [id number]  Take 1 tablet (6.25 mg) by mouth 2 times a day. Elizabeth Fermin MD  Active   folic acid (Folvite) 1 mg tablet [id number]  Take 1 tablet (1 mg) by mouth once daily. Elizabeth Fermin MD  Active   furosemide (Lasix) 40 mg tablet [id number]  Take 1 tablet (40 mg) by mouth once daily. Elizabeth Fermin MD  Active   furosemide (Lasix) 40 mg tablet [id number]  Take 1 tablet (40 mg) by mouth once daily for 5 days. Adama Le MD   [id number]   hydrocortisone acetate 1 % cream [id number]  Apply topically 3 times a day. Elizabeth Fermin MD  Active   hydrocortisone-aloe vera 1 % cream [id number]  Apply topically 3 times a day. Elizabeth Fermin MD  Active   naproxen (Naprosyn) 500 mg tablet [id number]  Take 1 tablet (500 mg) by mouth every 12 hours. Elizabeth Fermin MD  Active   polyethylene glycol (Glycolax, Miralax) 17 gram/dose powder [id number]  Take 17 g by mouth once daily as needed (constipation). Elizabeth Fermin MD  Active   spironolactone (Aldactone) 50 mg tablet [id number]  Take 1 tablet (50 mg) by mouth once daily. Elizabeth Fermin MD  Active   thiamine 100 mg tablet [id number]  Take 1 tablet (100 mg) by mouth once daily. Elizabeth Fermin MD  Active                     CURRENT MEDICATIONS  Scheduled medications  ARIPiprazole, 5 mg, oral, Daily  atorvastatin, 20 mg, oral, Nightly  carvedilol, 6.25 mg, oral, BID  cefTRIAXone, 1 g, intravenous, q24h  folic acid, 1 mg, oral, Daily  furosemide, 40 mg, oral, Daily  sennosides-docusate sodium, 2 tablet, oral,  BID  spironolactone, 50 mg, oral, Daily  thiamine, 100 mg, oral, Daily        Continuous medications  heparin, 0-4,500 Units/hr, Last Rate: 1,000 Units/hr (12/23/23 0835)        PRN medications  PRN medications: acetaminophen, heparin, melatonin, polyethylene glycol     LABS  Admission on 12/22/2023   Component Date Value Ref Range Status    WBC 12/22/2023 6.0  4.4 - 11.3 x10*3/uL Final    nRBC 12/22/2023 0.0  0.0 - 0.0 /100 WBCs Final    RBC 12/22/2023 3.97 (L)  4.00 - 5.20 x10*6/uL Final    Hemoglobin 12/22/2023 11.3 (L)  12.0 - 16.0 g/dL Final    Hematocrit 12/22/2023 33.5 (L)  36.0 - 46.0 % Final    MCV 12/22/2023 84  80 - 100 fL Final    MCH 12/22/2023 28.5  26.0 - 34.0 pg Final    MCHC 12/22/2023 33.7  32.0 - 36.0 g/dL Final    RDW 12/22/2023 18.5 (H)  11.5 - 14.5 % Final    Platelets 12/22/2023 98 (L)  150 - 450 x10*3/uL Final    Neutrophils % 12/22/2023 52.3  40.0 - 80.0 % Final    Immature Granulocytes %, Automated 12/22/2023 0.3  0.0 - 0.9 % Final    Immature Granulocyte Count (IG) includes promyelocytes, myelocytes and metamyelocytes but does not include bands. Percent differential counts (%) should be interpreted in the context of the absolute cell counts (cells/UL).    Lymphocytes % 12/22/2023 21.3  13.0 - 44.0 % Final    Monocytes % 12/22/2023 23.8  2.0 - 10.0 % Final    Eosinophils % 12/22/2023 1.3  0.0 - 6.0 % Final    Basophils % 12/22/2023 1.0  0.0 - 2.0 % Final    Neutrophils Absolute 12/22/2023 3.14  1.20 - 7.70 x10*3/uL Final    Percent differential counts (%) should be interpreted in the context of the absolute cell counts (cells/uL).    Immature Granulocytes Absolute, Au* 12/22/2023 0.02  0.00 - 0.70 x10*3/uL Final    Lymphocytes Absolute 12/22/2023 1.28  1.20 - 4.80 x10*3/uL Final    Monocytes Absolute 12/22/2023 1.43 (H)  0.10 - 1.00 x10*3/uL Final    Eosinophils Absolute 12/22/2023 0.08  0.00 - 0.70 x10*3/uL Final    Basophils Absolute 12/22/2023 0.06  0.00 - 0.10 x10*3/uL Final    Glucose  12/22/2023 109 (H)  74 - 99 mg/dL Final    Sodium 12/22/2023 136  136 - 145 mmol/L Final    Potassium 12/22/2023 3.4 (L)  3.5 - 5.3 mmol/L Final    Chloride 12/22/2023 107  98 - 107 mmol/L Final    Bicarbonate 12/22/2023 22  21 - 32 mmol/L Final    Anion Gap 12/22/2023 10  10 - 20 mmol/L Final    Urea Nitrogen 12/22/2023 6  6 - 23 mg/dL Final    Creatinine 12/22/2023 0.61  0.50 - 1.05 mg/dL Final    eGFR 12/22/2023 >90  >60 mL/min/1.73m*2 Final    Calculations of estimated GFR are performed using the 2021 CKD-EPI Study Refit equation without the race variable for the IDMS-Traceable creatinine methods.  https://jasn.asnjournals.org/content/early/2021/09/22/ASN.4689796449    Calcium 12/22/2023 8.3 (L)  8.6 - 10.6 mg/dL Final    Albumin 12/22/2023 2.9 (L)  3.4 - 5.0 g/dL Final    Alkaline Phosphatase 12/22/2023 142 (H)  33 - 136 U/L Final    Total Protein 12/22/2023 7.3  6.4 - 8.2 g/dL Final    AST 12/22/2023 78 (H)  9 - 39 U/L Final    Bilirubin, Total 12/22/2023 1.6 (H)  0.0 - 1.2 mg/dL Final    ALT 12/22/2023 42  7 - 45 U/L Final    Patients treated with Sulfasalazine may generate falsely decreased results for ALT.    Acetaminophen 12/22/2023 <10.0  10.0 - 30.0 ug/mL Final    Salicylate  12/22/2023 <3  4 - 20 mg/dL Final    Alcohol 12/22/2023 18 (H)  <=10 mg/dL Final    Amphetamine Screen, Urine 12/22/2023 Presumptive Negative  Presumptive Negative Final    CUTOFF LEVEL: 500 NG/ML   Cross-reactivity has been reported with high concentrations   of the following drugs: buproprion, chloroquine, chlorpromazine,   ephedrine, mephentermine, fenfluramine, phentermine,   phenylpropanolamine, pseudoephedrine, and propranolol.    Barbiturate Screen, Urine 12/22/2023 Presumptive Negative  Presumptive Negative Final    CUTOFF LEVEL: 200 NG/ML    Benzodiazepines Screen, Urine 12/22/2023 Presumptive Negative  Presumptive Negative Final    CUTOFF LEVEL: 200 NG/ML    Cannabinoid Screen, Urine 12/22/2023 Presumptive Negative   Presumptive Negative Final    CUTOFF LEVEL: 50 NG/ML    Cocaine Metabolite Screen, Urine 12/22/2023 Presumptive Negative  Presumptive Negative Final    CUTOFF LEVEL: 150 NG/ML    Fentanyl Screen, Urine 12/22/2023 Presumptive Negative  Presumptive Negative Final    CUTOFF LEVEL: 5 NG/ML    Opiate Screen, Urine 12/22/2023 Presumptive Negative  Presumptive Negative Final    CUTOFF LEVEL: 300 NG/ML  The opiate screen does not detect fentanyl, meperidine, or   tramadol. Oxycodone is not consistently detected (refer to  Oxycodone Screen, Urine result).    Oxycodone Screen, Urine 12/22/2023 Presumptive Negative  Presumptive Negative Final    CUTOFF LEVEL: 100 NG/ML  This test will accurately detect both oxycodone and oxymorphone.    PCP Screen, Urine 12/22/2023 Presumptive Negative  Presumptive Negative Final    CUTOFF LEVEL:  25 NG/ML  Cross-reactivity has been reported with dextromethorphan.    Magnesium 12/22/2023 2.10  1.60 - 2.40 mg/dL Final    Preg Test, Ur 12/22/2023 Negative  Negative In process    Coronavirus 2019, PCR 12/22/2023 Not Detected  Not Detected Final    Color, Urine 12/22/2023 Yellow  Straw, Yellow Final    Appearance, Urine 12/22/2023 Clear  Clear Final    Specific Gravity, Urine 12/22/2023 1.036 (N)  1.005 - 1.035 Final    pH, Urine 12/22/2023 5.0  5.0, 5.5, 6.0, 6.5, 7.0, 7.5, 8.0 Final    Protein, Urine 12/22/2023 NEGATIVE  NEGATIVE mg/dL Final    Glucose, Urine 12/22/2023 NEGATIVE  NEGATIVE mg/dL Final    Blood, Urine 12/22/2023 NEGATIVE  NEGATIVE Final    Ketones, Urine 12/22/2023 NEGATIVE  NEGATIVE mg/dL Final    Bilirubin, Urine 12/22/2023 NEGATIVE  NEGATIVE Final    Urobilinogen, Urine 12/22/2023 4.0 (N)  <2.0 mg/dL Final    Some pigments and medications may cause a false positive urobilinogen.    Nitrite, Urine 12/22/2023 POSITIVE (A)  NEGATIVE Final    Leukocyte Esterase, Urine 12/22/2023 MODERATE (2+) (A)  NEGATIVE Final    Extra Tube 12/22/2023 Hold for add-ons.   Final    Auto resulted.     Protime 12/22/2023 14.1 (H)  9.8 - 12.8 seconds Final    INR 12/22/2023 1.3 (H)  0.9 - 1.1 Final    aPTT 12/22/2023 33  27 - 38 seconds Final    ABO TYPE 12/22/2023 A   Final    Rh TYPE 12/22/2023 POS   Final    ANTIBODY SCREEN 12/22/2023 NEG   Final    WBC, Urine 12/22/2023 21-50 (A)  1-5, NONE /HPF Final    RBC, Urine 12/22/2023 1-2  NONE, 1-2, 3-5 /HPF Final    Squamous Epithelial Cells, Urine 12/22/2023 1-9 (SPARSE)  Reference range not established. /HPF Final    Mucus, Urine 12/22/2023 1+  Reference range not established. /LPF Final    Lactate 12/23/2023 1.5  0.4 - 2.0 mmol/L Final    WBC 12/23/2023 5.7  4.4 - 11.3 x10*3/uL Final    nRBC 12/23/2023 0.0  0.0 - 0.0 /100 WBCs Final    RBC 12/23/2023 3.73 (L)  4.00 - 5.20 x10*6/uL Final    Hemoglobin 12/23/2023 10.3 (L)  12.0 - 16.0 g/dL Final    Hematocrit 12/23/2023 32.7 (L)  36.0 - 46.0 % Final    MCV 12/23/2023 88  80 - 100 fL Final    MCH 12/23/2023 27.6  26.0 - 34.0 pg Final    MCHC 12/23/2023 31.5 (L)  32.0 - 36.0 g/dL Final    RDW 12/23/2023 18.6 (H)  11.5 - 14.5 % Final    Platelets 12/23/2023 92 (L)  150 - 450 x10*3/uL Final    Glucose 12/23/2023 122 (H)  74 - 99 mg/dL Final    Sodium 12/23/2023 135 (L)  136 - 145 mmol/L Final    Potassium 12/23/2023 3.9  3.5 - 5.3 mmol/L Final    Chloride 12/23/2023 106  98 - 107 mmol/L Final    Bicarbonate 12/23/2023 25  21 - 32 mmol/L Final    Anion Gap 12/23/2023 8 (L)  10 - 20 mmol/L Final    Urea Nitrogen 12/23/2023 6  6 - 23 mg/dL Final    Creatinine 12/23/2023 0.55  0.50 - 1.05 mg/dL Final    eGFR 12/23/2023 >90  >60 mL/min/1.73m*2 Final    Calculations of estimated GFR are performed using the 2021 CKD-EPI Study Refit equation without the race variable for the IDMS-Traceable creatinine methods.  https://jasn.asnjournals.org/content/early/2021/09/22/ASN.7407117989    Calcium 12/23/2023 7.9 (L)  8.6 - 10.6 mg/dL Final    Phosphorus 12/23/2023 2.6  2.5 - 4.9 mg/dL Final    The performance characteristics of  "phosphorus testing in heparinized plasma have been validated by the individual  laboratory site where testing is performed. Testing on heparinized plasma is not approved by the FDA; however, such approval is not necessary.    Albumin 12/23/2023 2.7 (L)  3.4 - 5.0 g/dL Final    Magnesium 12/23/2023 1.98  1.60 - 2.40 mg/dL Final    BNP 12/22/2023 10  0 - 99 pg/mL Final    Protime 12/23/2023 15.6 (H)  9.8 - 12.8 seconds Final    INR 12/23/2023 1.4 (H)  0.9 - 1.1 Final    aPTT 12/23/2023 >200 (HH)  27 - 38 seconds Final    Vitamin D, 25-Hydroxy, Total 12/23/2023 8 (L)  30 - 100 ng/mL Final    Thyroid Stimulating Hormone 12/22/2023 1.20  0.44 - 3.98 mIU/L Final    Vitamin B12 12/23/2023 1,195 (H)  211 - 911 pg/mL Final    Heparin Unfractionated 12/23/2023 0.9  See Comment Below for Therapeutic Ranges IU/mL Final       IMAGING  NA    ASSESSMENT:     PSYCHIATRIC RISK ASSESSMENT  Violence Risk Factors:  substance abuse   Acute Risk of Harm to Others is Considered: Low  Suicide Risk Factors: prior suicide attempts , lives alone or lack of social support, chronic medical illness, and chronic pain  Protective Factors: Jehovah's witness affiliation/spirituality  Acute Risk of Harm to Self is Considered: Moderate    DIAGNOSIS  Principal Problem:    Superior mesenteric vein thrombosis (CMS/HCC)        IMPRESSION  Yolie Moreno is a 63 y.o. female with PMH of AUD, HCV, Alcoholic liver cirrhosis c/b esophageal varicies and ascites; PPH of Bipolar 2 with psychotic features (last admission >10 years ago). Patient presents to ED for R flank pain and SI. EPAT saw patient and recommended inpatient psychitric admission, however admitted to medicine for SMV thrombosis and UTI. CL psychiatry consulted for SI    On assessment, patient appears to be in discomfort from pain, with irritable mood, guarded, and with concrete and perseverative thoughts. She denies any symptoms of depression/dwayne/psychosis and with cessation of SI as \"I can control " "my thoughts\", despite having active SI with intent and plan yesterday, which might be related to pain. Patient is at an elevated risk of suicide given previous attempts, current pain, and non-adherent with psychiatric medications. However, she denies SI at this time and does not meet criteria for IP psychiatric level of care, and is not appropriate for MPU transfer. Patient will benefit from restarting Abilify 5mg and adequate pain control.     RECOMMENDATIONS:   SAFETY  - Patient does not currently meet criteria for inpatient psychiatric admission.  - Patient lacks the capacity to leave AMA at this time and thus cannot leave AMA. Call [facility] if patient attempts to leave AMA.  - Patient require a 1:1 sitter from a psychiatric perspective at this time.    WORKUP  - EKG to monitor QTc given use of antipsychotics    MEDICATIONS  - Continue Abilify 5mg QHS (as patient reported it caused her sedation)  - PRN: Haldol 5mg po/im and Ativan 2mg po/im for severe agitation and anxiety    - Discussed recommendations with primary team.  - Psychiatry will continue to follow    Patient staffed/discussed with Dr. Betts, who agrees with above plan.    Thank you for allowing us to participate in the care of this patient. Please page [phone] with any questions or concerns.    Kirk Anders MD  Addiction Psychiatry Fellow, PGY5  [facility]    "

## 2023-12-23 NOTE — CONSULTS
[facility]   [facility]  INITIAL CONSULT NOTE       Reason For Consult  Decompensated EtoH cirrhosis    SUBJECTIVE     History Of Present Illness  Yolie Moreno is a 63 y.o. female with a past medical history of decompensated EtoH cirrhosis (large EV and ascites), ongoing alcohol abuse, bipolar disorder, hx of nephrolithiasis, HLD, admitted on 12/22/2023 with right flank pain. Hepatology is consulted for decompensated cirrhosis    She tells me she presented to the hospital for worsening abdominal pain. She had a paracentesis on 12/21 with 6.3L fluid removed. She has been going every 2 weeks. She feels like the fluid just came right back this last time. Feels like her legs are more swollen than usual. She tells me she hasn't had a drink in 17 months but her serum alcohol was positive yesterday. She denies any fevers and chills at home. Has not had any admissions for confusion and denies seeing blood in her stool or emesis. She reports living alone    Saw Dr. Guillaume 8/2022 at which point plan was to get EGD/colonoscopy, repeat labs, and follow up in 6-8 weeks. She was lost to follow up after this.On chart review, she has had multiple admissions this year for alcohol withdrawal/intoxication and has left AMA from several of these.    New SMV thrombus seen on CT A/P for which vascular surgery was consulted. They do not think the thrombus is related to her flank pain and have recommended anticoagulation. She has been started on heparin gtt     Review of Systems  See above    Past Medical History:    Past Medical History:   Diagnosis Date    Alcohol abuse     Bipolar II disorder (CMS/HCC)     Cirrhosis of liver (CMS/HCC)     Cocaine abuse (CMS/HCC)     Esophageal varices (CMS/HCC)     Hepatitis C     Hydronephrosis     MDD (major depressive disorder)     Trichomonal vulvovaginitis 07/30/2022    Trichomonas vaginitis       Home Medications  Medications Prior to  Admission   Medication Sig Dispense Refill Last Dose    acetaminophen (TylenoL) 325 mg capsule Take 2 capsules (650 mg) by mouth 3 times a day as needed.       ARIPiprazole (Abilify) 5 mg tablet Take 1 tablet (5 mg) by mouth once daily.       atorvastatin (Lipitor) 20 mg tablet Take 1 tablet (20 mg) by mouth once daily at bedtime.       carvedilol (Coreg) 6.25 mg tablet Take 1 tablet (6.25 mg) by mouth 2 times a day.       folic acid (Folvite) 1 mg tablet Take 1 tablet (1 mg) by mouth once daily.       furosemide (Lasix) 40 mg tablet Take 1 tablet (40 mg) by mouth once daily.       furosemide (Lasix) 40 mg tablet Take 1 tablet (40 mg) by mouth once daily for 5 days. 5 tablet 0     hydrocortisone acetate 1 % cream Apply topically 3 times a day.       hydrocortisone-aloe vera 1 % cream Apply topically 3 times a day.       naproxen (Naprosyn) 500 mg tablet Take 1 tablet (500 mg) by mouth every 12 hours.       polyethylene glycol (Glycolax, Miralax) 17 gram/dose powder Take 17 g by mouth once daily as needed (constipation).       spironolactone (Aldactone) 50 mg tablet Take 1 tablet (50 mg) by mouth once daily.       thiamine 100 mg tablet Take 1 tablet (100 mg) by mouth once daily.            Surgical History:    Past Surgical History:   Procedure Laterality Date    CHOLECYSTECTOMY  11/12/2014    Cholecystectomy    US GUIDED ABDOMINAL PARACENTESIS  6/14/2023    US GUIDED ABDOMINAL PARACENTESIS 6/14/2023 [facility] [place]    US GUIDED ABDOMINAL PARACENTESIS  7/3/2023    US GUIDED ABDOMINAL PARACENTESIS 7/3/2023 [facility] [place]    US GUIDED ABDOMINAL PARACENTESIS  9/7/2023    US GUIDED ABDOMINAL PARACENTESIS 9/7/2023 [facility] [place]    US GUIDED ABDOMINAL PARACENTESIS  9/28/2023    US GUIDED ABDOMINAL PARACENTESIS 9/28/2023 [facility] [place]    US GUIDED ABDOMINAL PARACENTESIS  10/19/2023    US GUIDED ABDOMINAL PARACENTESIS 10/19/2023 [facility] [place]    US GUIDED ABDOMINAL PARACENTESIS  11/9/2023    US GUIDED ABDOMINAL PARACENTESIS 11/9/2023 Carlos Chan,  APRN-CNP [facility]    US GUIDED ABDOMINAL PARACENTESIS  11/30/2023    US GUIDED ABDOMINAL PARACENTESIS 11/30/2023 [facility]    US GUIDED ABDOMINAL PARACENTESIS  12/21/2023    US GUIDED ABDOMINAL PARACENTESIS 12/21/2023 [facility]       Allergies:  No Known Allergies    Social History:    Social History     Socioeconomic History    Marital status:      Spouse name: Not on file    Number of children: Not on file    Years of education: Not on file    Highest education level: Not on file   Occupational History    Not on file   Tobacco Use    Smoking status: Never    Smokeless tobacco: Never   Vaping Use    Vaping Use: Never used   Substance and Sexual Activity    Alcohol use: Not Currently    Drug use: Not Currently    Sexual activity: Defer   Other Topics Concern    Not on file   Social History Narrative    Not on file     Social Determinants of Health     Financial Resource Strain: Low Risk  (12/23/2023)    Overall Financial Resource Strain (CARDIA)     Difficulty of Paying Living Expenses: Not hard at all   Food Insecurity: Food Insecurity Present (10/31/2023)    Hunger Vital Sign     Worried About Running Out of Food in the Last Year: Often true     Ran Out of Food in the Last Year: Often true   Transportation Needs: Unmet Transportation Needs (12/23/2023)    PRAPARE - Transportation     Lack of Transportation (Medical): Yes     Lack of Transportation (Non-Medical): No   Physical Activity: Not on file   Stress: Not on file   Social Connections: Not on file   Intimate Partner Violence: Not on file   Housing Stability: Low Risk  (12/23/2023)    Housing Stability Vital Sign     Unable to Pay for Housing in the Last Year: No     Number of Places Lived in the Last Year: 2     Unstable Housing in the Last Year: No       Family History:    Family History   Problem Relation Name Age of Onset    Diabetes type II Father         EXAM     Vitals:    Vitals:    12/22/23 2002 12/23/23 0100 12/23/23 0439 12/23/23 0808   BP: 112/80  112/80 96/64 104/61   BP Location:  Right arm     Patient Position:  Sitting     Pulse: 98 91 91 84   Resp: 17 16 16 16   Temp: 37.2 °C (98.9 °F) 36.5 °C (97.7 °F) 36.3 °C (97.3 °F) 36.4 °C (97.5 °F)   TempSrc: Oral Temporal     SpO2: 100% 100% 97% 99%   Weight:       Height:             Intake/Output Summary (Last 24 hours) at 12/23/2023 1108  Last data filed at 12/23/2023 0037  Gross per 24 hour   Intake 50 ml   Output --   Net 50 ml         Physical Exam  General: chronically ill appearing, no acute distress  HEENT: PERRLA, EOM intact, no scleral icterus, moist MM  Respiratory: CTA bilaterally, normal work of breathing  Cardiovascular: RRR, no murmurs/rubs/gallops  Abdomen: Soft, distended with fluid wave, nontender  Extremities: no edema, no asterixis  Neuro: alert and oriented, CNII-XII grossly intact, moves all 4 extremities with no focal deficits    OBJECTIVE                                                                              Medications       Current Facility-Administered Medications:     acetaminophen (Tylenol) tablet 650 mg, 650 mg, oral, q8h PRN, MOODY Iniguez-CNP    ARIPiprazole (Abilify) tablet 5 mg, 5 mg, oral, Daily, MAGALYS Iniguez, 5 mg at 12/23/23 0951    atorvastatin (Lipitor) tablet 20 mg, 20 mg, oral, Nightly, MOODY Iniguez-CNP    carvedilol (Coreg) tablet 6.25 mg, 6.25 mg, oral, BID, MAGALYS Iniguez, 6.25 mg at 12/23/23 0951    cefTRIAXone (Rocephin) IVPB 1 g, 1 g, intravenous, q24h, MAGALYS Iniguez    folic acid (Folvite) tablet 1 mg, 1 mg, oral, Daily, MAGALYS Iniguez, 1 mg at 12/23/23 0952    furosemide (Lasix) tablet 40 mg, 40 mg, oral, Daily, MAGALYS Iniguez, 40 mg at 12/23/23 0951    heparin (porcine) injection 2,000-4,000 Units, 2,000-4,000 Units, intravenous, q4h PRN, MOODY Iniguez-CNP    heparin 25,000 Units in dextrose 5% 250 mL (100 Units/mL) infusion  (premix), 0-4,500 Units/hr, intravenous, Continuous, MAGALYS Iniguez, Last Rate: 10 mL/hr at 12/23/23 0835, 1,000 Units/hr at 12/23/23 0835    melatonin tablet 5 mg, 5 mg, oral, Nightly PRN, MAGALYS Iniguez    polyethylene glycol (Glycolax, Miralax) packet 17 g, 17 g, oral, Daily PRN, MAGALYS Iniguez    sennosides-docusate sodium (Trinity-Colace) 8.6-50 mg per tablet 2 tablet, 2 tablet, oral, BID, MAGALYS Iniguez, 2 tablet at 12/23/23 0900    spironolactone (Aldactone) tablet 50 mg, 50 mg, oral, Daily, MAGALYS Iniguez, 50 mg at 12/23/23 0952    thiamine (Vitamin B-1) tablet 100 mg, 100 mg, oral, Daily, MAGALYS Iniguez, 100 mg at 12/23/23 0951                                                                            Labs     Results for orders placed or performed during the hospital encounter of 12/22/23 (from the past 24 hour(s))   CBC and Auto Differential   Result Value Ref Range    WBC 6.0 4.4 - 11.3 x10*3/uL    nRBC 0.0 0.0 - 0.0 /100 WBCs    RBC 3.97 (L) 4.00 - 5.20 x10*6/uL    Hemoglobin 11.3 (L) 12.0 - 16.0 g/dL    Hematocrit 33.5 (L) 36.0 - 46.0 %    MCV 84 80 - 100 fL    MCH 28.5 26.0 - 34.0 pg    MCHC 33.7 32.0 - 36.0 g/dL    RDW 18.5 (H) 11.5 - 14.5 %    Platelets 98 (L) 150 - 450 x10*3/uL    Neutrophils % 52.3 40.0 - 80.0 %    Immature Granulocytes %, Automated 0.3 0.0 - 0.9 %    Lymphocytes % 21.3 13.0 - 44.0 %    Monocytes % 23.8 2.0 - 10.0 %    Eosinophils % 1.3 0.0 - 6.0 %    Basophils % 1.0 0.0 - 2.0 %    Neutrophils Absolute 3.14 1.20 - 7.70 x10*3/uL    Immature Granulocytes Absolute, Automated 0.02 0.00 - 0.70 x10*3/uL    Lymphocytes Absolute 1.28 1.20 - 4.80 x10*3/uL    Monocytes Absolute 1.43 (H) 0.10 - 1.00 x10*3/uL    Eosinophils Absolute 0.08 0.00 - 0.70 x10*3/uL    Basophils Absolute 0.06 0.00 - 0.10 x10*3/uL   Comprehensive metabolic panel   Result Value Ref Range    Glucose 109 (H) 74 - 99 mg/dL     Sodium 136 136 - 145 mmol/L    Potassium 3.4 (L) 3.5 - 5.3 mmol/L    Chloride 107 98 - 107 mmol/L    Bicarbonate 22 21 - 32 mmol/L    Anion Gap 10 10 - 20 mmol/L    Urea Nitrogen 6 6 - 23 mg/dL    Creatinine 0.61 0.50 - 1.05 mg/dL    eGFR >90 >60 mL/min/1.73m*2    Calcium 8.3 (L) 8.6 - 10.6 mg/dL    Albumin 2.9 (L) 3.4 - 5.0 g/dL    Alkaline Phosphatase 142 (H) 33 - 136 U/L    Total Protein 7.3 6.4 - 8.2 g/dL    AST 78 (H) 9 - 39 U/L    Bilirubin, Total 1.6 (H) 0.0 - 1.2 mg/dL    ALT 42 7 - 45 U/L   Acute Toxicology Panel, Blood   Result Value Ref Range    Acetaminophen <10.0 10.0 - 30.0 ug/mL    Salicylate  <3 4 - 20 mg/dL    Alcohol 18 (H) <=10 mg/dL   Magnesium   Result Value Ref Range    Magnesium 2.10 1.60 - 2.40 mg/dL   Coagulation Screen   Result Value Ref Range    Protime 14.1 (H) 9.8 - 12.8 seconds    INR 1.3 (H) 0.9 - 1.1    aPTT 33 27 - 38 seconds   Type and Screen   Result Value Ref Range    ABO TYPE A     Rh TYPE POS     ANTIBODY SCREEN NEG    B-type natriuretic peptide   Result Value Ref Range    BNP 10 0 - 99 pg/mL   TSH with reflex to Free T4 if abnormal   Result Value Ref Range    Thyroid Stimulating Hormone 1.20 0.44 - 3.98 mIU/L   Sars-CoV-2 PCR, Screen Asymptomatic   Result Value Ref Range    Coronavirus 2019, PCR Not Detected Not Detected   Drug Screen, Urine   Result Value Ref Range    Amphetamine Screen, Urine Presumptive Negative Presumptive Negative    Barbiturate Screen, Urine Presumptive Negative Presumptive Negative    Benzodiazepines Screen, Urine Presumptive Negative Presumptive Negative    Cannabinoid Screen, Urine Presumptive Negative Presumptive Negative    Cocaine Metabolite Screen, Urine Presumptive Negative Presumptive Negative    Fentanyl Screen, Urine Presumptive Negative Presumptive Negative    Opiate Screen, Urine Presumptive Negative Presumptive Negative    Oxycodone Screen, Urine Presumptive Negative Presumptive Negative    PCP Screen, Urine Presumptive Negative  Presumptive Negative   Urinalysis with Reflex Culture and Microscopic   Result Value Ref Range    Color, Urine Yellow Straw, Yellow    Appearance, Urine Clear Clear    Specific Gravity, Urine 1.036 (N) 1.005 - 1.035    pH, Urine 5.0 5.0, 5.5, 6.0, 6.5, 7.0, 7.5, 8.0    Protein, Urine NEGATIVE NEGATIVE mg/dL    Glucose, Urine NEGATIVE NEGATIVE mg/dL    Blood, Urine NEGATIVE NEGATIVE    Ketones, Urine NEGATIVE NEGATIVE mg/dL    Bilirubin, Urine NEGATIVE NEGATIVE    Urobilinogen, Urine 4.0 (N) <2.0 mg/dL    Nitrite, Urine POSITIVE (A) NEGATIVE    Leukocyte Esterase, Urine MODERATE (2+) (A) NEGATIVE   Extra Urine Gray Tube   Result Value Ref Range    Extra Tube Hold for add-ons.    Microscopic Only, Urine   Result Value Ref Range    WBC, Urine 21-50 (A) 1-5, NONE /HPF    RBC, Urine 1-2 NONE, 1-2, 3-5 /HPF    Squamous Epithelial Cells, Urine 1-9 (SPARSE) Reference range not established. /HPF    Mucus, Urine 1+ Reference range not established. /LPF   POCT pregnancy, urine   Result Value Ref Range    Preg Test, Ur Negative Negative   Lactate   Result Value Ref Range    Lactate 1.5 0.4 - 2.0 mmol/L   CBC   Result Value Ref Range    WBC 5.7 4.4 - 11.3 x10*3/uL    nRBC 0.0 0.0 - 0.0 /100 WBCs    RBC 3.73 (L) 4.00 - 5.20 x10*6/uL    Hemoglobin 10.3 (L) 12.0 - 16.0 g/dL    Hematocrit 32.7 (L) 36.0 - 46.0 %    MCV 88 80 - 100 fL    MCH 27.6 26.0 - 34.0 pg    MCHC 31.5 (L) 32.0 - 36.0 g/dL    RDW 18.6 (H) 11.5 - 14.5 %    Platelets 92 (L) 150 - 450 x10*3/uL   Renal Function Panel   Result Value Ref Range    Glucose 122 (H) 74 - 99 mg/dL    Sodium 135 (L) 136 - 145 mmol/L    Potassium 3.9 3.5 - 5.3 mmol/L    Chloride 106 98 - 107 mmol/L    Bicarbonate 25 21 - 32 mmol/L    Anion Gap 8 (L) 10 - 20 mmol/L    Urea Nitrogen 6 6 - 23 mg/dL    Creatinine 0.55 0.50 - 1.05 mg/dL    eGFR >90 >60 mL/min/1.73m*2    Calcium 7.9 (L) 8.6 - 10.6 mg/dL    Phosphorus 2.6 2.5 - 4.9 mg/dL    Albumin 2.7 (L) 3.4 - 5.0 g/dL   Magnesium   Result Value  Ref Range    Magnesium 1.98 1.60 - 2.40 mg/dL   Coagulation Screen   Result Value Ref Range    Protime 15.6 (H) 9.8 - 12.8 seconds    INR 1.4 (H) 0.9 - 1.1    aPTT >200 (HH) 27 - 38 seconds   Vitamin D 25-Hydroxy,Total (for eval of Vitamin D levels)   Result Value Ref Range    Vitamin D, 25-Hydroxy, Total 8 (L) 30 - 100 ng/mL   Heparin Assay, UFH   Result Value Ref Range    Heparin Unfractionated 0.9 See Comment Below for Therapeutic Ranges IU/mL   Vitamin B12   Result Value Ref Range    Vitamin B12 1,195 (H) 211 - 911 pg/mL                                                                              Imaging           12/22 CT AP with IV contrast  IMPRESSION:  No acute abdominopelvic process.      Nonocclusive thrombus within the superior mesenteric vein, new from  07/02/2023.      Hepatic cirrhosis with similar findings of portal hypertension  including large volume ascites, splenomegaly, and recanalized  umbilical vein.      Enlarged ovaries with multiple cysts, grossly stable from 07/02/2023,  however new from 05/26/2022. Further evaluation nonemergent pelvic  ultrasound is recommended, if not already performed.      Stable biliary duct dilatation with intermediate density debris  within the common bile duct. Choledocholithiasis not excluded. Please  correlate clinically for biliary obstruction and consider further  evaluation with ERCP or MRCP if clinically indicated.                                                                           GI Procedures     8/2022 EGD  Findings:       The Z-line was regular and was found 40 cm from the incisors.       Three columns of large (> 5 mm) varices were found in the lower third of        the esophagus. Red diana signs were present. Banding was not attempted        due to patient's intolerance to anesthesia.       Moderate portal hypertensive gastropathy was found in the stomach.       One small semi-sessile polyp with no bleeding and no stigmata of recent         bleeding was found in the gastric fundus.       There is no endoscopic evidence of varices in the stomach.       The examined duodenum was normal.       The exam was otherwise without abnormality.  Impression:            - Z-line regular, 40 cm from the incisors.                         - Large (> 5 mm) esophageal varices.                         - Portal hypertensive gastropathy.                         - Normal examined duodenum.                         - No specimens collected.    8/2022 Colonoscopy  Findings:       The perianal and digital rectal examinations were normal.       A small polyp was found in the cecum. The polyp was sessile. The polyp        was removed with a cold biopsy forceps. Resection and retrieval were        complete. The pathology specimen was placed into Bottle A.       A 10 mm polyp was found in the descending colon. The polyp was        semi-sessile. The polyp was removed with a cold snare. Resection and        retrieval were complete. The pathology specimen was placed into Bottle B.       A 14 mm polyp was found in the sigmoid colon. The polyp was        semi-pedunculated. Area was successfully injected with 3 mL of a 0.1        mg/mL solution of epinephrine for a lift polypectomy. The polyp was        removed with a hot snare. Resection and retrieval were complete. The        pathology specimen was placed into Bottle C.       A small polyp was found in the rectum. The polyp was hyperplastic.        Polypectomy wasn't attempted.       Internal hemorrhoids were found during retroflexion. The hemorrhoids        were small.  Moderate Sedation:       Moderate (conscious) sedation was administered by the endoscopy nurse        and supervised by the endoscopist. The patient's oxygen saturation,        heart rate, blood pressure and response to care were monitored. Total        physician intraservice time was 57 minutes.  FINAL DIAGNOSIS   A. CECUM, POLYP, POLYPECTOMY:   -- TUBULAR ADENOMA.      B. DESCENDING COLON, POLYP, POLYPECTOMY:   -- TRADITIONAL SERRATED ADENOMA.     C. SIGMOID COLON, POLYP, POLYPECTOMY:   -- INFLAMMATORY-TYPE POLYP WITH SURFACE ULCERATION.   -- NEGATIVE FOR DYSPLASIA.                ASSESSMENT / PLAN                  ASSESSMENT/PLAN:  Yolie Moreno is a 63 y.o. female with a past medical history of decompensated EtoH cirrhosis (large EV and ascites), ongoing alcohol abuse, bipolar disorder, hx of nephrolithiasis, HLD, admitted on 12/22/2023 with right flank pain. Hepatology is consulted for decompensated cirrhosis.     #Decompensated EtOH Cirrhosis  #Alcohol Abuse    MELD 3.0: 15 at 12/23/2023  7:10 AM  MELD-Na: 14 at 12/23/2023  7:10 AM  Calculated from:  Serum Creatinine: 0.55 mg/dL (Using min of 1 mg/dL) at 12/23/2023  7:10 AM  Serum Sodium: 135 mmol/L at 12/23/2023  7:10 AM  Total Bilirubin: 1.6 mg/dL at 12/22/2023  7:43 PM  Serum Albumin: 2.7 g/dL at 12/23/2023  7:10 AM  INR(ratio): 1.4 at 12/23/2023  7:10 AM  Age at listing (hypothetical): 63 years  Sex: Female at 12/23/2023  7:10 AM    - Ascites: Present; paracentesis q2 weekly last 12/21; consider diagnostic para now to rule out SBP  - Volume: overloaded. On Lasix 40 and spironolactone 50  - EV: last EGD 8/2022 showed large EV, not eradicated. Continue coreg 6.25mg BID. Needs repeat EGD  - HE: No prior history  - Immunizations: defer to outpatient  - HCC screening: CTAP 12/2023 no lesions, 5/2023 AFP 5  - Transplant: still actively drinking, serum ethanol 18 on admission    #SMV thrombus  -incidentally seen on CT AP. Suspect this is a chronic finding and not related to her reports of flank pain  -Vascular surgery consulted and recommended anticoagulation  -Would recommend AGAINST anticoagulation at this time in the setting of known large esophageal varices 8/2022 and ongoing alcohol abuse. The risks of anticoagulation likely outweigh the benefits in this case    Recommendations:  -Recommend AGAINST anticoagulation at  this time as above  -Consider diagnostic paracentesis to rule out SBP  -Please send AFP  -Continue Lasix/Spironolactone  -Continue Coreg 6.25mg BID for now  -Consider CIWA given ongoing alcohol abuse    Patient was discussed with Dr. Guillaume. Will be seen by attending tomorrow AM    Thank you for the consultation. Hepatology will continue to the follow .  - During weekday hours of 7am- 5pm, please do not hesitate to contact me on [employer] Chat or page [phone] if there are any further questions   - After hours, on weekends, and on holidays, please page the on-call GI fellow at [phone]. Thank you.       Sera Escobar MD  PGY4 Gastroenterology Fellow  [facility]

## 2023-12-23 NOTE — CONSULTS
"Reason For Consult  Nonocclusive SMV thrombus    History Of Present Illness  Yolie Moreno is a 63 y.o. female with hx of decompensated EtOH cirrhosis requiring paracentesis every 2 weeks and remote hx of kidney stones presenting with severe R flank pain. Patient underwent scheduled therapeutic paracentesis yesterday, during which time 6.3L of ascites was drained. She felt well until this afternoon around 12pm, at which time she began to have severe R flank pain. Reports the pain is constant and does not radiate. It is somewhat improved now compared to earlier. She denies any abdominal pain. Denies fevers/chills, nausea/vomiting, dysuria, hematuria, urinary frequency or urgency. CT A/P was performed in ED for flank pain with finding of nonocclusive SMV thrombus new compared to prior imaging in July 2023. Vascular surgery is consulted for SMV thrombus. Patient denies hx of blood clots.     Past Medical History  EtOH cirrhosis  Kidney stones  Bipolar disorder    Surgical History  Cholecystectomy  Multiple paracenteses     Social History  Used to drink one case of [place] 45 daily; quit one year ago. Denies tobacco or illicit drug use.    Family History  Noncontributory     Allergies  Patient has no known allergies.    Review of Systems  12-point ROS negative except per HPI     Physical Exam  General: resting in bed, non toxic, NAD  CV: regular rate and rhythm  Pulm: nonlabored respiratory effort on RA  Abd: soft, protuberant, nontender to palpation  Flank/back: moderate reproducible R flank pain, no overlying skin changes  Ext: no edema, b/l DP/PT palpable  Neuro: awake and alert, no focal deficits  Psych: appropriate affect     Last Recorded Vitals  Blood pressure 112/80, pulse 98, temperature 37.2 °C (98.9 °F), temperature source Oral, resp. rate 17, height 1.651 m (5' 5\"), weight 63.5 kg (140 lb), SpO2 100 %.    Relevant Results  Results for orders placed or performed during the hospital encounter of 12/22/23 (from " the past 24 hour(s))   CBC and Auto Differential   Result Value Ref Range    WBC 6.0 4.4 - 11.3 x10*3/uL    nRBC 0.0 0.0 - 0.0 /100 WBCs    RBC 3.97 (L) 4.00 - 5.20 x10*6/uL    Hemoglobin 11.3 (L) 12.0 - 16.0 g/dL    Hematocrit 33.5 (L) 36.0 - 46.0 %    MCV 84 80 - 100 fL    MCH 28.5 26.0 - 34.0 pg    MCHC 33.7 32.0 - 36.0 g/dL    RDW 18.5 (H) 11.5 - 14.5 %    Platelets 98 (L) 150 - 450 x10*3/uL    Neutrophils % 52.3 40.0 - 80.0 %    Immature Granulocytes %, Automated 0.3 0.0 - 0.9 %    Lymphocytes % 21.3 13.0 - 44.0 %    Monocytes % 23.8 2.0 - 10.0 %    Eosinophils % 1.3 0.0 - 6.0 %    Basophils % 1.0 0.0 - 2.0 %    Neutrophils Absolute 3.14 1.20 - 7.70 x10*3/uL    Immature Granulocytes Absolute, Automated 0.02 0.00 - 0.70 x10*3/uL    Lymphocytes Absolute 1.28 1.20 - 4.80 x10*3/uL    Monocytes Absolute 1.43 (H) 0.10 - 1.00 x10*3/uL    Eosinophils Absolute 0.08 0.00 - 0.70 x10*3/uL    Basophils Absolute 0.06 0.00 - 0.10 x10*3/uL   Comprehensive metabolic panel   Result Value Ref Range    Glucose 109 (H) 74 - 99 mg/dL    Sodium 136 136 - 145 mmol/L    Potassium 3.4 (L) 3.5 - 5.3 mmol/L    Chloride 107 98 - 107 mmol/L    Bicarbonate 22 21 - 32 mmol/L    Anion Gap 10 10 - 20 mmol/L    Urea Nitrogen 6 6 - 23 mg/dL    Creatinine 0.61 0.50 - 1.05 mg/dL    eGFR >90 >60 mL/min/1.73m*2    Calcium 8.3 (L) 8.6 - 10.6 mg/dL    Albumin 2.9 (L) 3.4 - 5.0 g/dL    Alkaline Phosphatase 142 (H) 33 - 136 U/L    Total Protein 7.3 6.4 - 8.2 g/dL    AST 78 (H) 9 - 39 U/L    Bilirubin, Total 1.6 (H) 0.0 - 1.2 mg/dL    ALT 42 7 - 45 U/L   Acute Toxicology Panel, Blood   Result Value Ref Range    Acetaminophen <10.0 10.0 - 30.0 ug/mL    Salicylate  <3 4 - 20 mg/dL    Alcohol 18 (H) <=10 mg/dL   Magnesium   Result Value Ref Range    Magnesium 2.10 1.60 - 2.40 mg/dL   Coagulation Screen   Result Value Ref Range    Protime 14.1 (H) 9.8 - 12.8 seconds    INR 1.3 (H) 0.9 - 1.1    aPTT 33 27 - 38 seconds   Type and Screen   Result Value Ref Range     ABO TYPE A     Rh TYPE POS     ANTIBODY SCREEN NEG    Sars-CoV-2 PCR, Screen Asymptomatic   Result Value Ref Range    Coronavirus 2019, PCR Not Detected Not Detected   Drug Screen, Urine   Result Value Ref Range    Amphetamine Screen, Urine Presumptive Negative Presumptive Negative    Barbiturate Screen, Urine Presumptive Negative Presumptive Negative    Benzodiazepines Screen, Urine Presumptive Negative Presumptive Negative    Cannabinoid Screen, Urine Presumptive Negative Presumptive Negative    Cocaine Metabolite Screen, Urine Presumptive Negative Presumptive Negative    Fentanyl Screen, Urine Presumptive Negative Presumptive Negative    Opiate Screen, Urine Presumptive Negative Presumptive Negative    Oxycodone Screen, Urine Presumptive Negative Presumptive Negative    PCP Screen, Urine Presumptive Negative Presumptive Negative   Urinalysis with Reflex Culture and Microscopic   Result Value Ref Range    Color, Urine Yellow Straw, Yellow    Appearance, Urine Clear Clear    Specific Gravity, Urine 1.036 (N) 1.005 - 1.035    pH, Urine 5.0 5.0, 5.5, 6.0, 6.5, 7.0, 7.5, 8.0    Protein, Urine NEGATIVE NEGATIVE mg/dL    Glucose, Urine NEGATIVE NEGATIVE mg/dL    Blood, Urine NEGATIVE NEGATIVE    Ketones, Urine NEGATIVE NEGATIVE mg/dL    Bilirubin, Urine NEGATIVE NEGATIVE    Urobilinogen, Urine 4.0 (N) <2.0 mg/dL    Nitrite, Urine POSITIVE (A) NEGATIVE    Leukocyte Esterase, Urine MODERATE (2+) (A) NEGATIVE   Microscopic Only, Urine   Result Value Ref Range    WBC, Urine 21-50 (A) 1-5, NONE /HPF    RBC, Urine 1-2 NONE, 1-2, 3-5 /HPF    Squamous Epithelial Cells, Urine 1-9 (SPARSE) Reference range not established. /HPF    Mucus, Urine 1+ Reference range not established. /LPF   POCT pregnancy, urine   Result Value Ref Range    Preg Test, Ur Negative Negative      CT abdomen pelvis w IV contrast    Result Date: 12/22/2023  Interpreted By:  Deepika Young,  and Ursula Lovett STUDY: CT ABDOMEN PELVIS W IV CONTRAST;   12/22/2023 8:49 pm   INDICATION: Signs/Symptoms:severe R flank pain, recent paracentesis, hx cirrhosis.   COMPARISON: CT chest abdomen pelvis 07/02/2023   ACCESSION NUMBER(S): [id number]   ORDERING CLINICIAN: YUNIOR PRICE   TECHNIQUE: Contiguous axial images of the abdomen and pelvis were obtained after the intravenous administration of 80 mL Omnipaque 350 contrast. Coronal and sagittal reformatted images were reconstructed from the axial data.   FINDINGS: LOWER CHEST: No acute abnormality.     ABDOMEN/PELVIS:   ABDOMINAL WALL: No significant abnormality.   LIVER: The liver is shrunken with nodular contour. No focal lesion identified.   BILE DUCTS: There is left-greater-than-right intrahepatic biliary duct dilatation, similar to prior. Dilated common bile duct measuring up to 11 mm. There common bile duct again demonstrates internal intermediate density material.   GALLBLADDER: Surgically absent.   PANCREAS: Stable 12 mm hypodense lesion in the pancreatic uncinate process.   SPLEEN: Similar enlargement of the spleen measuring up to 13.6 cm in craniocaudal dimension. No focal lesion.   ADRENALS: No significant abnormality.   KIDNEYS, URETERS, BLADDER: The kidneys are symmetric in size and enhancement. There is chronic marked enlargement of the left renal pelvis without significant hydronephrosis or hydroureter. No visualized urinary tract calculus. The urinary bladder is within normal limits for degree of distention.   REPRODUCTIVE ORGANS: There is similar multi-cystic enlargement of ovaries, left-greater-than-right. A 4.3 x 3.1 cm circumscribed hyperdense structure is again noted in the right anterior uterine fundus in keeping with suspected leiomyoma.   VESSELS: Recanalized umbilical vein. There is a new nonocclusive filling defect along the posterior and left lateral wall of the upper aspect of the superior mesenteric vein (series 201, images 50-53).   RETROPERITONEUM/LYMPH NODES: No enlarged lymph nodes. No  acute retroperitoneal abnormality. No retroperitoneal hematoma.   BOWEL/MESENTERY/PERITONEUM: The stomach is within normal limits. No inflammatory bowel wall thickening or dilatation. The appendix is within normal limits.   Persistent large volume ascites. No focal fluid collection. No pneumoperitoneum.     MUSCULOSKELETAL: No acute osseous abnormality. No suspicious osseous lesions.       No acute abdominopelvic process.   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   Hepatic cirrhosis with similar findings of portal hypertension including large volume ascites, splenomegaly, and recanalized umbilical vein.   Enlarged ovaries with multiple cysts, grossly stable from 07/02/2023, however new from 05/26/2022. Further evaluation nonemergent pelvic ultrasound is recommended, if not already performed.   Stable biliary duct dilatation with intermediate density debris within the common bile duct. Choledocholithiasis not excluded. Please correlate clinically for biliary obstruction and consider further evaluation with ERCP or MRCP if clinically indicated.   I personally reviewed the images/study and I agree with the findings as stated by Rachell Vergara MD. This study was interpreted at [place], [place].   MACRO: None.   Signed by: Deepika Young 12/22/2023 10:45 PM Dictation workstation:   [id number]    US guided abdominal paracentesis    Result Date: 12/21/2023  Interpreted By:  Deanna Turner, STUDY: US GUIDED ABDOMINAL PARACENTESIS; 12/21/202312:02 pm   INDICATION: Signs/Symptoms:ascites.   COMPARISON: None.   ACCESSION NUMBER(S): [id number]   ORDERING CLINICIAN: DEANNA TURNER   TECHNIQUE: INTERVENTIONALIST(S): Deanna Turner   CONSENT: The patient/patient's POA/next of kin was informed of the nature of the proposed procedure. The purposes, alternatives, risks, and benefits were explained and discussed. All questions were  answered and consent was obtained.   SEDATION: None   MEDICATION/CONTRAST: 25 Grams albumin IV was ordered following procedure.   TIME OUT: A time out was performed immediately prior to procedure start with the interventional team, correctly identifying the patient name, date of birth, MRN, procedure, anatomy (including marking of site and side), patient position, procedure consent form, relevant laboratory and imaging test results, antibiotic administration, safety precautions, and procedure-specific equipment needs.   FINDINGS: The patient was placed in the supine position.   The abdominal space was examined with grey scale ultrasound, and the most accessible fluid identified and marked for paracentesis.   The skin was prepped and draped in usual manner. Local anesthesia with Lidocaine was administered and a left-sided paracentesis was performed.  A 5 Turkish One-Step paracentesis needle/catheter was then placed where marked.  Approximately 6300 mL of yellowish colored fluid was removed.  The needle/catheter was then withdrawn.   The patient tolerated the procedure well and there were no immediate complications.       Uneventful paracentesis, as detailed above. Left Hemiabdomen, 6300 mL   I personally performed and/or directly supervised this study and was present for the entire procedure.   Performed and dictated at [facility].   Signed by: Carlos Chan 12/21/2023 12:02 PM Dictation workstation:   [id number]       Assessment/Plan     63 y.o. female with hx of decompensated EtOH cirrhosis requiring paracentesis every 2 weeks and remote hx of kidney stones presenting with severe, non-radiating R flank pain. CT A/P was performed in ED for flank pain with finding of nonocclusive SMV thrombus new compared to prior imaging in July 2023, for which vascular surgery is consulted. No evidence of kidney stones on CT. UA with positive nitrite, moderate leuk esterase, 21-50 WBC. No  leukocytosis. Patient has no systemic symptoms and denies prior hx of blood clots. Her SMV thrombus appears unrelated to her current symptom of R flank pain, which appears musculoskeletal vs urologic in nature.    Recommendations:    - No indication for vascular surgery  - Recommend admission to medicine and vascular consult for therapeutic AC for SMV thrombus  - Please page with further questions or concerns.    Discussed with vascular fellow Dr. Zhao. To be discussed with attending Dr. Marcus.    Sandra Caro MD  Vascular Surgery [id number]

## 2023-12-23 NOTE — H&P
"History Of Present Illness  Yolie Moreno is a 63 y.o. female with a past medical history of Hep C, alcoholic cirrhosis c/b ascites, ETOH abuse, bipolar II disorder with psychotic features, MDD, dysplipidemia, and esophageal varices. Pt presented to the ED for further evaluation of 10/10 right flank pain that started at noon on 12/22 and SI. Pt reports that she gets scheduled paracentesis outpatient and last one was performed yesterday. 6300 mL was removed and pt received 25g of albumin after paracentesis on 12/21. Pt reports that she usually has no issues after paracentesis is done. Pt called her PCP d/t intolerable pain and told her PCP that she felt suicidal due to pain. Pt reports that she planned on overdosing on her BP medications. PCP called 911 after speaking with pt. Pt also called her daughter and told her that she was contemplating suicide with plan to overdose on her BP meds. Dtr left work early after speaking with pt but when she arrived to home of pt EMS was there to take pt to hospital. She reports that pain improves with movement and walking but pain gets worse when she lies down. Pt did not take any medications at home prior to ED presentation. Pt denied SI upon arrival to ED and pt told ED provider that she realizes suicide will not solve anything. Pt stated, \"I am just in pain.\" Pt also reports that she is not complaint with her home dose of Abilify because it makes her too sleepy. EPAT consulted by ED provider and pt meets criteria for inpatient criteria on Med psych unit.     Last admit to inpatient psych was 8/29-8/30 d/t paranoia/hallucinations. Labs obtained on admit notable for hypokalemia, UTI, elevated LFTs  -, and hypocalcemia. ETOH level 18 on admit. Utox negative. Imaging obtained while in the ED showed superior mesenteric vein thromobosis and persistent large volume ascites. Vascular surgery was consulted by ED provider due to findings seen on imaging. Pt seen by consulting team " while in the ED and no surgical intervention is planned at this time. Consulting team recommended admission to medicine team with vascular medicine consult for AC recs. IV antibiotics (Rocephin) initiated while pt in the ED. Suicide and elopement precautions initiated while in ED. Pt  ordered for pt safety. Heparin gtt initiated while in ED. Pt denied SI when seen by writer on HH3. Pt is still experiencing severe right flank pain. She denies hematuria, dysuria, pelvic pain, and suprapubic tenderness. She does endorse urinary frequency and dark colored urine. Pt denied SI when seen by writer. Pt will be admitted to medicine service for further treatment and management of UTI, SMV thrombosis, and SI.      Past Medical History  Past Medical History:   Diagnosis Date    Alcohol abuse     Bipolar II disorder (CMS/HCC)     Cirrhosis of liver (CMS/HCC)     Cocaine abuse (CMS/HCC)     Esophageal varices (CMS/HCC)     Hepatitis C     Hydronephrosis     MDD (major depressive disorder)     Trichomonal vulvovaginitis 07/30/2022    Trichomonas vaginitis       Surgical History  Past Surgical History:   Procedure Laterality Date    CHOLECYSTECTOMY  11/12/2014    Cholecystectomy    US GUIDED ABDOMINAL PARACENTESIS  6/14/2023    US GUIDED ABDOMINAL PARACENTESIS 6/14/2023 [facility] [place]    US GUIDED ABDOMINAL PARACENTESIS  7/3/2023    US GUIDED ABDOMINAL PARACENTESIS 7/3/2023 [facility] [place]    US GUIDED ABDOMINAL PARACENTESIS  9/7/2023    US GUIDED ABDOMINAL PARACENTESIS 9/7/2023 [facility] [place]    US GUIDED ABDOMINAL PARACENTESIS  9/28/2023    US GUIDED ABDOMINAL PARACENTESIS 9/28/2023 [facility] [place]    US GUIDED ABDOMINAL PARACENTESIS  10/19/2023    US GUIDED ABDOMINAL PARACENTESIS 10/19/2023 [facility] [place]    US GUIDED ABDOMINAL PARACENTESIS  11/9/2023    US GUIDED ABDOMINAL PARACENTESIS 11/9/2023 Carlos Chan, APRN-CNP [facility] [place]    US GUIDED ABDOMINAL PARACENTESIS  11/30/2023    US GUIDED ABDOMINAL PARACENTESIS 11/30/2023 [facility] [place] [facility] [place]    US GUIDED ABDOMINAL  PARACENTESIS  12/21/2023     GUIDED ABDOMINAL PARACENTESIS 12/21/2023 [facility]        Social History  She reports that she has never smoked. She has never used smokeless tobacco. She reports that she does not currently use alcohol. She reports that she does not currently use drugs.    Family History  Family History   Problem Relation Name Age of Onset    Diabetes type II Father          Allergies  Patient has no known allergies.    Review of Systems   Constitutional:  Negative for chills, fatigue and fever.   HENT:  Negative for congestion, rhinorrhea, sore throat and trouble swallowing.    Eyes: Negative.    Respiratory:  Negative for cough, shortness of breath and wheezing.    Cardiovascular:  Positive for leg swelling. Negative for chest pain.   Gastrointestinal:  Positive for abdominal pain. Negative for diarrhea, nausea and vomiting.   Endocrine: Negative.    Genitourinary:  Positive for flank pain, frequency and urgency. Negative for difficulty urinating, dysuria and hematuria.   Skin: Negative.    Neurological:  Negative for dizziness, light-headedness, numbness and headaches.   Psychiatric/Behavioral:  Positive for suicidal ideas. Negative for agitation, confusion and sleep disturbance. The patient is not nervous/anxious.         Physical Exam  Vitals reviewed.   Constitutional:       General: She is not in acute distress.     Appearance: Normal appearance. She is not ill-appearing.   HENT:      Head: Normocephalic and atraumatic.      Nose: Nose normal.      Mouth/Throat:      Mouth: Mucous membranes are moist.   Eyes:      Extraocular Movements: Extraocular movements intact.      Conjunctiva/sclera: Conjunctivae normal.   Cardiovascular:      Rate and Rhythm: Normal rate.      Pulses: Normal pulses.      Heart sounds: Normal heart sounds.   Abdominal:      General: Bowel sounds are normal. There is distension.      Palpations: Abdomen is soft.      Tenderness: There is right CVA tenderness. There is no  "guarding.   Musculoskeletal:      Cervical back: Normal range of motion and neck supple.      Comments: Trace BLE edema   Skin:     General: Skin is warm and dry.   Neurological:      Mental Status: She is alert and oriented to person, place, and time. Mental status is at baseline.   Psychiatric:         Mood and Affect: Mood normal.          Last Recorded Vitals  Blood pressure 112/80, pulse 98, temperature 37.2 °C (98.9 °F), temperature source Oral, resp. rate 17, height 1.651 m (5' 5\"), weight 63.5 kg (140 lb), SpO2 100 %.    Relevant Results  CT abdomen pelvis w IV contrast    Result Date: 12/22/2023  Interpreted By:  Deepika Young  and Ursula Lovett STUDY: CT ABDOMEN PELVIS W IV CONTRAST;  12/22/2023 8:49 pm   INDICATION: Signs/Symptoms:severe R flank pain, recent paracentesis, hx cirrhosis.   COMPARISON: CT chest abdomen pelvis 07/02/2023   ACCESSION NUMBER(S): [id number]   ORDERING CLINICIAN: YUNIOR PRICE   TECHNIQUE: Contiguous axial images of the abdomen and pelvis were obtained after the intravenous administration of 80 mL Omnipaque 350 contrast. Coronal and sagittal reformatted images were reconstructed from the axial data.   FINDINGS: LOWER CHEST: No acute abnormality.     ABDOMEN/PELVIS:   ABDOMINAL WALL: No significant abnormality.   LIVER: The liver is shrunken with nodular contour. No focal lesion identified.   BILE DUCTS: There is left-greater-than-right intrahepatic biliary duct dilatation, similar to prior. Dilated common bile duct measuring up to 11 mm. There common bile duct again demonstrates internal intermediate density material.   GALLBLADDER: Surgically absent.   PANCREAS: Stable 12 mm hypodense lesion in the pancreatic uncinate process.   SPLEEN: Similar enlargement of the spleen measuring up to 13.6 cm in craniocaudal dimension. No focal lesion.   ADRENALS: No significant abnormality.   KIDNEYS, URETERS, BLADDER: The kidneys are symmetric in size and enhancement. There is chronic " marked enlargement of the left renal pelvis without significant hydronephrosis or hydroureter. No visualized urinary tract calculus. The urinary bladder is within normal limits for degree of distention.   REPRODUCTIVE ORGANS: There is similar multi-cystic enlargement of ovaries, left-greater-than-right. A 4.3 x 3.1 cm circumscribed hyperdense structure is again noted in the right anterior uterine fundus in keeping with suspected leiomyoma.   VESSELS: Recanalized umbilical vein. There is a new nonocclusive filling defect along the posterior and left lateral wall of the upper aspect of the superior mesenteric vein (series 201, images 50-53).   RETROPERITONEUM/LYMPH NODES: No enlarged lymph nodes. No acute retroperitoneal abnormality. No retroperitoneal hematoma.   BOWEL/MESENTERY/PERITONEUM: The stomach is within normal limits. No inflammatory bowel wall thickening or dilatation. The appendix is within normal limits.   Persistent large volume ascites. No focal fluid collection. No pneumoperitoneum.     MUSCULOSKELETAL: No acute osseous abnormality. No suspicious osseous lesions.       No acute abdominopelvic process.   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   Hepatic cirrhosis with similar findings of portal hypertension including large volume ascites, splenomegaly, and recanalized umbilical vein.   Enlarged ovaries with multiple cysts, grossly stable from 07/02/2023, however new from 05/26/2022. Further evaluation nonemergent pelvic ultrasound is recommended, if not already performed.   Stable biliary duct dilatation with intermediate density debris within the common bile duct. Choledocholithiasis not excluded. Please correlate clinically for biliary obstruction and consider further evaluation with ERCP or MRCP if clinically indicated.   I personally reviewed the images/study and I agree with the findings as stated by Rachell Vergara MD. This study was interpreted at  [place], [place].   MACRO: None.   Signed by: Deepika Young 12/22/2023 10:45 PM Dictation workstation:   [id number]    US guided abdominal paracentesis    Result Date: 12/21/2023  Interpreted By:  Deanna Turner, STUDY: US GUIDED ABDOMINAL PARACENTESIS; 12/21/202312:02 pm   INDICATION: Signs/Symptoms:ascites.   COMPARISON: None.   ACCESSION NUMBER(S): [id number]   ORDERING CLINICIAN: DEANNA TURNER   TECHNIQUE: INTERVENTIONALIST(S): Deanna Turner   CONSENT: The patient/patient's POA/next of kin was informed of the nature of the proposed procedure. The purposes, alternatives, risks, and benefits were explained and discussed. All questions were answered and consent was obtained.   SEDATION: None   MEDICATION/CONTRAST: 25 Grams albumin IV was ordered following procedure.   TIME OUT: A time out was performed immediately prior to procedure start with the interventional team, correctly identifying the patient name, date of birth, MRN, procedure, anatomy (including marking of site and side), patient position, procedure consent form, relevant laboratory and imaging test results, antibiotic administration, safety precautions, and procedure-specific equipment needs.   FINDINGS: The patient was placed in the supine position.   The abdominal space was examined with grey scale ultrasound, and the most accessible fluid identified and marked for paracentesis.   The skin was prepped and draped in usual manner. Local anesthesia with Lidocaine was administered and a left-sided paracentesis was performed.  A 5 [facility] One-Step paracentesis needle/catheter was then placed where marked.  Approximately 6300 mL of yellowish colored fluid was removed.  The needle/catheter was then withdrawn.   The patient tolerated the procedure well and there were no immediate complications.       Uneventful paracentesis, as detailed above. Left Hemiabdomen, 6300 mL   I personally performed and/or  directly supervised this study and was present for the entire procedure.   Performed and dictated at [facility].   Signed by: Carlos Chan 12/21/2023 12:02 PM Dictation workstation:   [id number]      Results for orders placed or performed during the hospital encounter of 12/22/23 (from the past 24 hour(s))   CBC and Auto Differential   Result Value Ref Range    WBC 6.0 4.4 - 11.3 x10*3/uL    nRBC 0.0 0.0 - 0.0 /100 WBCs    RBC 3.97 (L) 4.00 - 5.20 x10*6/uL    Hemoglobin 11.3 (L) 12.0 - 16.0 g/dL    Hematocrit 33.5 (L) 36.0 - 46.0 %    MCV 84 80 - 100 fL    MCH 28.5 26.0 - 34.0 pg    MCHC 33.7 32.0 - 36.0 g/dL    RDW 18.5 (H) 11.5 - 14.5 %    Platelets 98 (L) 150 - 450 x10*3/uL    Neutrophils % 52.3 40.0 - 80.0 %    Immature Granulocytes %, Automated 0.3 0.0 - 0.9 %    Lymphocytes % 21.3 13.0 - 44.0 %    Monocytes % 23.8 2.0 - 10.0 %    Eosinophils % 1.3 0.0 - 6.0 %    Basophils % 1.0 0.0 - 2.0 %    Neutrophils Absolute 3.14 1.20 - 7.70 x10*3/uL    Immature Granulocytes Absolute, Automated 0.02 0.00 - 0.70 x10*3/uL    Lymphocytes Absolute 1.28 1.20 - 4.80 x10*3/uL    Monocytes Absolute 1.43 (H) 0.10 - 1.00 x10*3/uL    Eosinophils Absolute 0.08 0.00 - 0.70 x10*3/uL    Basophils Absolute 0.06 0.00 - 0.10 x10*3/uL   Comprehensive metabolic panel   Result Value Ref Range    Glucose 109 (H) 74 - 99 mg/dL    Sodium 136 136 - 145 mmol/L    Potassium 3.4 (L) 3.5 - 5.3 mmol/L    Chloride 107 98 - 107 mmol/L    Bicarbonate 22 21 - 32 mmol/L    Anion Gap 10 10 - 20 mmol/L    Urea Nitrogen 6 6 - 23 mg/dL    Creatinine 0.61 0.50 - 1.05 mg/dL    eGFR >90 >60 mL/min/1.73m*2    Calcium 8.3 (L) 8.6 - 10.6 mg/dL    Albumin 2.9 (L) 3.4 - 5.0 g/dL    Alkaline Phosphatase 142 (H) 33 - 136 U/L    Total Protein 7.3 6.4 - 8.2 g/dL    AST 78 (H) 9 - 39 U/L    Bilirubin, Total 1.6 (H) 0.0 - 1.2 mg/dL    ALT 42 7 - 45 U/L   Acute Toxicology Panel, Blood   Result Value Ref Range    Acetaminophen <10.0  10.0 - 30.0 ug/mL    Salicylate  <3 4 - 20 mg/dL    Alcohol 18 (H) <=10 mg/dL   Magnesium   Result Value Ref Range    Magnesium 2.10 1.60 - 2.40 mg/dL   Coagulation Screen   Result Value Ref Range    Protime 14.1 (H) 9.8 - 12.8 seconds    INR 1.3 (H) 0.9 - 1.1    aPTT 33 27 - 38 seconds   Type and Screen   Result Value Ref Range    ABO TYPE A     Rh TYPE POS     ANTIBODY SCREEN NEG    Sars-CoV-2 PCR, Screen Asymptomatic   Result Value Ref Range    Coronavirus 2019, PCR Not Detected Not Detected   Drug Screen, Urine   Result Value Ref Range    Amphetamine Screen, Urine Presumptive Negative Presumptive Negative    Barbiturate Screen, Urine Presumptive Negative Presumptive Negative    Benzodiazepines Screen, Urine Presumptive Negative Presumptive Negative    Cannabinoid Screen, Urine Presumptive Negative Presumptive Negative    Cocaine Metabolite Screen, Urine Presumptive Negative Presumptive Negative    Fentanyl Screen, Urine Presumptive Negative Presumptive Negative    Opiate Screen, Urine Presumptive Negative Presumptive Negative    Oxycodone Screen, Urine Presumptive Negative Presumptive Negative    PCP Screen, Urine Presumptive Negative Presumptive Negative   Urinalysis with Reflex Culture and Microscopic   Result Value Ref Range    Color, Urine Yellow Straw, Yellow    Appearance, Urine Clear Clear    Specific Gravity, Urine 1.036 (N) 1.005 - 1.035    pH, Urine 5.0 5.0, 5.5, 6.0, 6.5, 7.0, 7.5, 8.0    Protein, Urine NEGATIVE NEGATIVE mg/dL    Glucose, Urine NEGATIVE NEGATIVE mg/dL    Blood, Urine NEGATIVE NEGATIVE    Ketones, Urine NEGATIVE NEGATIVE mg/dL    Bilirubin, Urine NEGATIVE NEGATIVE    Urobilinogen, Urine 4.0 (N) <2.0 mg/dL    Nitrite, Urine POSITIVE (A) NEGATIVE    Leukocyte Esterase, Urine MODERATE (2+) (A) NEGATIVE   Microscopic Only, Urine   Result Value Ref Range    WBC, Urine 21-50 (A) 1-5, NONE /HPF    RBC, Urine 1-2 NONE, 1-2, 3-5 /HPF    Squamous Epithelial Cells, Urine 1-9 (SPARSE) Reference  range not established. /HPF    Mucus, Urine 1+ Reference range not established. /LPF   POCT pregnancy, urine   Result Value Ref Range    Preg Test, Ur Negative Negative       Scheduled medications  ARIPiprazole, 5 mg, oral, Daily  atorvastatin, 20 mg, oral, Nightly  carvedilol, 6.25 mg, oral, BID  cefTRIAXone, 1 g, intravenous, q24h  folic acid, 1 mg, oral, Daily  furosemide, 40 mg, oral, Daily  heparin, 80 Units/kg, intravenous, Once  lidocaine, 1 patch, transdermal, Once  sennosides-docusate sodium, 2 tablet, oral, BID  spironolactone, 50 mg, oral, Daily  thiamine, 100 mg, oral, Daily      Continuous medications  heparin, 0-4,500 Units/hr      PRN medications  PRN medications: acetaminophen, heparin, melatonin, polyethylene glycol    Assessment/Plan   Principal Problem:    Superior mesenteric vein thrombosis (CMS/HCC)    Yolie Moreno is 63 yr old female with a PMH of Hep C, alcoholic cirrhosis c/b ascites, ETOH abuse, bipolar II disorder with psychotic features, MDD, dysplipidemia, and esophageal varices. Pt admitted to medicine service for further treatment and management of UTI, SMV thrombosis, and SI. Vascular medicine consult needed.    #suicidal ideation  #bipolar II disorder  - suicide and elopement precautions in place  - EPAT consulted and pt meets criteria for inpatient psych admission. Plan to transfer pt to med psych unit later today  - patient safety tray ordered  - sitter ordered for pt safety  - continue home dose of Abilify 5 mg PO every day (consider switching to alternate medication since pt does not like feeling too sleepy while on this med)    #superior mesenteric vein thrombosis  - consult vascular medicine for AC recs  - Heparin gtt initiated   - monitor pt for s/s of acute mesenteric ischemia (worsening abdominal pain out of proportion to physical exam, n/v/d), trend lactate (AM draw and PM draw ordered), and RFP (ordered for AM draw and PM draw)  - Lidocaine patch to R flank  - Tylenol  650 mg q8h PRN     #UTI  - UA: +2 leukocyte esterase, +nitrite, 21-50 WBCs, 1-2 RBC Urine culture: Pending results with sensitivities   - Prior Urine culture result (6/25/23): E.coli >100,000 CFU/ml sensitive to Ampicillin, Ancef, Rocephin, Zosyn, Bactrim DS, Levaquin, Gentamicin, and Cipro  - Antibiotic Regimen: Rocephin 1g Q24hr (12/22-). Transition to PO antibiotic at discharge   - Monitor kidney function; Provide maintenance fluids if needed; Encourage oral hydration     #hypokalemia  - K 3.4 on admit, potassium chloride 40 mEQ x1 dose ordered  - likely d/t diuretic use outpatient  - RFP in AM    #anemia (stable)  - Hgb 11.3 on admit, Hgb 11.5 on 12/7  - continue to monitor CBC    #Alcoholic cirrhosis c/b recurrent ascites  #Hep C  #elevated LFTs (stable)  - AST 78 Alk P 142 total bili 1.6 on admit, AST 85 Alk P 170 total bili 1.9 on 12/7  - consider RUQ ultrasound if LFTs start to uptrend  - last paracentesis 12/21: 6300 ml removed  - MELD score: 11  - continue home dose of Lasix 40 mg PO every day and Aldactone 50 mg PO every day  - 2g Na diet, 2L FR  - daily weight  - Hep C + 5/2022, viral load undetectable  - HCV viral load undetectable 5/2023     #HTN  - last /80  - continue to monitor BP  - continue home dose of Coreg 6.25 mg PO BID (hold for SBP<110 or HR<60)    #dyslipidemia  - continue home dose of Lipitor 20 mg PO at bedtime    #ETOH abuse  #cocaine abuse  - Ethanol level 18 on admit  - Utox  negative  - Utox +cocaine on 8/29/83  - continue home dose of Thiamine 100 mg PO every day and Folic acid 1 mg PO QD    Dispo: admit to [facility]. Plan per above      I spent 60 minutes in the professional and overall care of this patient.      Sepideh Frias, APRN-CNP

## 2023-12-24 LAB
AFP SERPL-MCNC: 4 NG/ML (ref 0–9)
ALBUMIN SERPL BCP-MCNC: 2.2 G/DL (ref 3.4–5)
ANION GAP SERPL CALC-SCNC: 10 MMOL/L (ref 10–20)
APTT PPP: 198 SECONDS (ref 27–38)
BUN SERPL-MCNC: 8 MG/DL (ref 6–23)
CALCIUM SERPL-MCNC: 7.6 MG/DL (ref 8.6–10.6)
CHLORIDE SERPL-SCNC: 106 MMOL/L (ref 98–107)
CO2 SERPL-SCNC: 23 MMOL/L (ref 21–32)
CREAT SERPL-MCNC: 0.59 MG/DL (ref 0.5–1.05)
ERYTHROCYTE [DISTWIDTH] IN BLOOD BY AUTOMATED COUNT: 18.6 % (ref 11.5–14.5)
GFR SERPL CREATININE-BSD FRML MDRD: >90 ML/MIN/1.73M*2
GLUCOSE SERPL-MCNC: 92 MG/DL (ref 74–99)
HCT VFR BLD AUTO: 30.3 % (ref 36–46)
HGB BLD-MCNC: 9.7 G/DL (ref 12–16)
INR PPP: 1.4 (ref 0.9–1.1)
MAGNESIUM SERPL-MCNC: 1.96 MG/DL (ref 1.6–2.4)
MCH RBC QN AUTO: 28 PG (ref 26–34)
MCHC RBC AUTO-ENTMCNC: 32 G/DL (ref 32–36)
MCV RBC AUTO: 88 FL (ref 80–100)
NRBC BLD-RTO: 0 /100 WBCS (ref 0–0)
PHOSPHATE SERPL-MCNC: 2.7 MG/DL (ref 2.5–4.9)
PLATELET # BLD AUTO: 93 X10*3/UL (ref 150–450)
POTASSIUM SERPL-SCNC: 4.2 MMOL/L (ref 3.5–5.3)
PROTHROMBIN TIME: 15.7 SECONDS (ref 9.8–12.8)
RBC # BLD AUTO: 3.46 X10*6/UL (ref 4–5.2)
SODIUM SERPL-SCNC: 135 MMOL/L (ref 136–145)
UFH PPP CHRO-ACNC: 0.6 IU/ML
WBC # BLD AUTO: 5.8 X10*3/UL (ref 4.4–11.3)

## 2023-12-24 PROCEDURE — 2500000004 HC RX 250 GENERAL PHARMACY W/ HCPCS (ALT 636 FOR OP/ED): Mod: JZ | Performed by: STUDENT IN AN ORGANIZED HEALTH CARE EDUCATION/TRAINING PROGRAM

## 2023-12-24 PROCEDURE — 83735 ASSAY OF MAGNESIUM: CPT | Performed by: NURSE PRACTITIONER

## 2023-12-24 PROCEDURE — 80069 RENAL FUNCTION PANEL: CPT | Performed by: NURSE PRACTITIONER

## 2023-12-24 PROCEDURE — 99233 SBSQ HOSP IP/OBS HIGH 50: CPT | Performed by: STUDENT IN AN ORGANIZED HEALTH CARE EDUCATION/TRAINING PROGRAM

## 2023-12-24 PROCEDURE — 2500000004 HC RX 250 GENERAL PHARMACY W/ HCPCS (ALT 636 FOR OP/ED): Performed by: NURSE PRACTITIONER

## 2023-12-24 PROCEDURE — 1100000001 HC PRIVATE ROOM DAILY

## 2023-12-24 PROCEDURE — 99233 SBSQ HOSP IP/OBS HIGH 50: CPT | Performed by: INTERNAL MEDICINE

## 2023-12-24 PROCEDURE — 36415 COLL VENOUS BLD VENIPUNCTURE: CPT | Performed by: NURSE PRACTITIONER

## 2023-12-24 PROCEDURE — 85027 COMPLETE CBC AUTOMATED: CPT | Performed by: NURSE PRACTITIONER

## 2023-12-24 PROCEDURE — 2500000004 HC RX 250 GENERAL PHARMACY W/ HCPCS (ALT 636 FOR OP/ED): Performed by: STUDENT IN AN ORGANIZED HEALTH CARE EDUCATION/TRAINING PROGRAM

## 2023-12-24 PROCEDURE — 85610 PROTHROMBIN TIME: CPT | Performed by: NURSE PRACTITIONER

## 2023-12-24 PROCEDURE — P9047 ALBUMIN (HUMAN), 25%, 50ML: HCPCS | Mod: JZ | Performed by: STUDENT IN AN ORGANIZED HEALTH CARE EDUCATION/TRAINING PROGRAM

## 2023-12-24 PROCEDURE — 2500000001 HC RX 250 WO HCPCS SELF ADMINISTERED DRUGS (ALT 637 FOR MEDICARE OP): Performed by: NURSE PRACTITIONER

## 2023-12-24 PROCEDURE — 85520 HEPARIN ASSAY: CPT | Performed by: NURSE PRACTITIONER

## 2023-12-24 RX ORDER — FUROSEMIDE 10 MG/ML
20 INJECTION INTRAMUSCULAR; INTRAVENOUS EVERY 12 HOURS
Status: DISCONTINUED | OUTPATIENT
Start: 2023-12-24 | End: 2023-12-26 | Stop reason: HOSPADM

## 2023-12-24 RX ORDER — ALBUMIN HUMAN 250 G/1000ML
25 SOLUTION INTRAVENOUS ONCE
Status: COMPLETED | OUTPATIENT
Start: 2023-12-24 | End: 2023-12-24

## 2023-12-24 RX ADMIN — ACETAMINOPHEN 650 MG: 325 TABLET ORAL at 16:57

## 2023-12-24 RX ADMIN — FUROSEMIDE 20 MG: 10 INJECTION, SOLUTION INTRAVENOUS at 20:56

## 2023-12-24 RX ADMIN — THIAMINE HCL TAB 100 MG 100 MG: 100 TAB at 08:13

## 2023-12-24 RX ADMIN — SENNOSIDES AND DOCUSATE SODIUM 2 TABLET: 8.6; 5 TABLET ORAL at 08:13

## 2023-12-24 RX ADMIN — FOLIC ACID 1 MG: 1 TABLET ORAL at 08:13

## 2023-12-24 RX ADMIN — ATORVASTATIN CALCIUM 20 MG: 20 TABLET, FILM COATED ORAL at 20:55

## 2023-12-24 RX ADMIN — CEFTRIAXONE SODIUM 1 G: 1 INJECTION, SOLUTION INTRAVENOUS at 21:00

## 2023-12-24 RX ADMIN — SPIRONOLACTONE 50 MG: 25 TABLET, FILM COATED ORAL at 08:13

## 2023-12-24 RX ADMIN — FUROSEMIDE 40 MG: 40 TABLET ORAL at 08:13

## 2023-12-24 RX ADMIN — ARIPIPRAZOLE 5 MG: 5 TABLET ORAL at 08:14

## 2023-12-24 RX ADMIN — SODIUM CHLORIDE, SODIUM LACTATE, POTASSIUM CHLORIDE, AND CALCIUM CHLORIDE 1000 ML: 600; 310; 30; 20 INJECTION, SOLUTION INTRAVENOUS at 04:30

## 2023-12-24 RX ADMIN — ALBUMIN HUMAN 25 G: 0.25 SOLUTION INTRAVENOUS at 18:42

## 2023-12-24 ASSESSMENT — COGNITIVE AND FUNCTIONAL STATUS - GENERAL
DAILY ACTIVITIY SCORE: 24
MOBILITY SCORE: 22
CLIMB 3 TO 5 STEPS WITH RAILING: A LITTLE
MOBILITY SCORE: 24
DAILY ACTIVITIY SCORE: 24
WALKING IN HOSPITAL ROOM: A LITTLE
MOBILITY SCORE: 24

## 2023-12-24 ASSESSMENT — PAIN - FUNCTIONAL ASSESSMENT
PAIN_FUNCTIONAL_ASSESSMENT: 0-10
PAIN_FUNCTIONAL_ASSESSMENT: 0-10

## 2023-12-24 ASSESSMENT — PAIN SCALES - GENERAL
PAINLEVEL_OUTOF10: 3
PAINLEVEL_OUTOF10: 0 - NO PAIN
PAINLEVEL_OUTOF10: 2
PAINLEVEL_OUTOF10: 0 - NO PAIN

## 2023-12-24 NOTE — CARE PLAN
The patient's goals for the shift include  patient will be HD throughout shift    The clinical goals for the shift include patient will remain safe throughout shift      Problem: Fall/Injury  Goal: Not fall by end of shift  Outcome: Progressing  Goal: Be free from injury by end of the shift  Outcome: Progressing  Goal: Verbalize understanding of personal risk factors for fall in the hospital  Outcome: Progressing  Goal: Verbalize understanding of risk factor reduction measures to prevent injury from fall in the home  Outcome: Progressing  Goal: Use assistive devices by end of the shift  Outcome: Progressing  Goal: Pace activities to prevent fatigue by end of the shift  Outcome: Progressing     Problem: Pain - Adult  Goal: Verbalizes/displays adequate comfort level or baseline comfort level  Outcome: Progressing     Problem: Safety - Adult  Goal: Free from fall injury  Outcome: Progressing

## 2023-12-24 NOTE — PROGRESS NOTES
"[facility]  [facility] [place]  CONSULT FOLLOW-UP     Reason For Consult  Decompensated EtOH associated cirrhosis     SUBJECTIVE     No concerns. This AM her BP was 89/58, Coreg was held, and she was given 1 L LR.    EXAM     Last Recorded Vitals  Blood pressure 89/58, pulse 80, temperature 36.6 °C (97.9 °F), resp. rate 16, height 1.651 m (5' 5\"), weight 63.5 kg (140 lb), SpO2 98 %.      Intake/Output Summary (Last 24 hours) at 12/24/2023 0957  Last data filed at 12/24/2023 0000  Gross per 24 hour   Intake 265.58 ml   Output --   Net 265.58 ml       Physical Exam   General: chronically ill appearing, no acute distress  HEENT: PERRLA, EOM intact, no scleral icterus  Respiratory: CTA bilaterally, normal work of breathing  Cardiovascular: RRR, no murmurs/rubs/gallops  Abdomen: Soft, distended with fluid wave, nontender  Extremities: BL LE 2+ pitting edema above ankles, no asterixis  Neuro: alert and oriented, CNII-XII grossly intact, moves all 4 extremities with no focal deficits      OBJECTIVE                                                                              Medications             Current Facility-Administered Medications:     acetaminophen (Tylenol) tablet 650 mg, 650 mg, oral, q8h PRN, MAGALYS Iniguez    ARIPiprazole (Abilify) tablet 5 mg, 5 mg, oral, Daily, MAGALYS Iniguez, 5 mg at 12/24/23 0814    atorvastatin (Lipitor) tablet 20 mg, 20 mg, oral, Nightly, MAGALYS Iniguez, 20 mg at 12/23/23 2110    carvedilol (Coreg) tablet 6.25 mg, 6.25 mg, oral, BID, MAGALYS Iniguez, 6.25 mg at 12/23/23 0951    cefTRIAXone (Rocephin) IVPB 1 g, 1 g, intravenous, q24h, MAGALYS Iniguez, Stopped at 12/23/23 2140    folic acid (Folvite) tablet 1 mg, 1 mg, oral, Daily, MAGALYS Iniguez, 1 mg at 12/24/23 0813    furosemide (Lasix) tablet 40 mg, 40 mg, oral, Daily, Sepideh Frias, " APRN-CNP, 40 mg at 12/24/23 0813    heparin (porcine) injection 2,000-4,000 Units, 2,000-4,000 Units, intravenous, q4h PRN, MAGALYS Iniguez    heparin 25,000 Units in dextrose 5% 250 mL (100 Units/mL) infusion (premix), 0-4,500 Units/hr, intravenous, Continuous, MAGALYS Iniguez, Last Rate: 10 mL/hr at 12/24/23 0743, 1,000 Units/hr at 12/24/23 0743    melatonin tablet 5 mg, 5 mg, oral, Nightly PRN, MAGALYS Iniguez    polyethylene glycol (Glycolax, Miralax) packet 17 g, 17 g, oral, Daily PRN, MAGALYS Iniguez    sennosides-docusate sodium (Trinity-Colace) 8.6-50 mg per tablet 2 tablet, 2 tablet, oral, BID, MAGALYS Iniguez, 2 tablet at 12/24/23 0813    spironolactone (Aldactone) tablet 50 mg, 50 mg, oral, Daily, MAGALYS Iniguez, 50 mg at 12/24/23 0813    thiamine (Vitamin B-1) tablet 100 mg, 100 mg, oral, Daily, MAGALYS Iniguez, 100 mg at 12/24/23 0813                                                                            Labs     Results for orders placed or performed during the hospital encounter of 12/22/23 (from the past 24 hour(s))   aPTT - baseline   Result Value Ref Range    aPTT 188 (HH) 27 - 38 seconds   Heparin Assay, UFH   Result Value Ref Range    Heparin Unfractionated 0.7 See Comment Below for Therapeutic Ranges IU/mL   Lactate   Result Value Ref Range    Lactate 1.2 0.4 - 2.0 mmol/L   Renal Function Panel   Result Value Ref Range    Glucose 113 (H) 74 - 99 mg/dL    Sodium 134 (L) 136 - 145 mmol/L    Potassium 4.1 3.5 - 5.3 mmol/L    Chloride 106 98 - 107 mmol/L    Bicarbonate 24 21 - 32 mmol/L    Anion Gap 8 (L) 10 - 20 mmol/L    Urea Nitrogen 8 6 - 23 mg/dL    Creatinine 0.62 0.50 - 1.05 mg/dL    eGFR >90 >60 mL/min/1.73m*2    Calcium 8.0 (L) 8.6 - 10.6 mg/dL    Phosphorus 3.0 2.5 - 4.9 mg/dL    Albumin 2.7 (L) 3.4 - 5.0 g/dL   Hepatic function panel   Result Value Ref Range    Albumin 2.7 (L)  3.4 - 5.0 g/dL    Bilirubin, Total 2.3 (H) 0.0 - 1.2 mg/dL    Bilirubin, Direct 0.9 (H) 0.0 - 0.3 mg/dL    Alkaline Phosphatase 120 33 - 136 U/L    ALT 42 7 - 45 U/L    AST 64 (H) 9 - 39 U/L    Total Protein 6.6 6.4 - 8.2 g/dL   Heparin Assay, UFH   Result Value Ref Range    Heparin Unfractionated 0.6 See Comment Below for Therapeutic Ranges IU/mL   CBC   Result Value Ref Range    WBC 5.8 4.4 - 11.3 x10*3/uL    nRBC 0.0 0.0 - 0.0 /100 WBCs    RBC 3.46 (L) 4.00 - 5.20 x10*6/uL    Hemoglobin 9.7 (L) 12.0 - 16.0 g/dL    Hematocrit 30.3 (L) 36.0 - 46.0 %    MCV 88 80 - 100 fL    MCH 28.0 26.0 - 34.0 pg    MCHC 32.0 32.0 - 36.0 g/dL    RDW 18.6 (H) 11.5 - 14.5 %    Platelets 93 (L) 150 - 450 x10*3/uL   Renal Function Panel   Result Value Ref Range    Glucose 92 74 - 99 mg/dL    Sodium 135 (L) 136 - 145 mmol/L    Potassium 4.2 3.5 - 5.3 mmol/L    Chloride 106 98 - 107 mmol/L    Bicarbonate 23 21 - 32 mmol/L    Anion Gap 10 10 - 20 mmol/L    Urea Nitrogen 8 6 - 23 mg/dL    Creatinine 0.59 0.50 - 1.05 mg/dL    eGFR >90 >60 mL/min/1.73m*2    Calcium 7.6 (L) 8.6 - 10.6 mg/dL    Phosphorus 2.7 2.5 - 4.9 mg/dL    Albumin 2.2 (L) 3.4 - 5.0 g/dL   Magnesium   Result Value Ref Range    Magnesium 1.96 1.60 - 2.40 mg/dL   Coagulation Screen   Result Value Ref Range    Protime 15.7 (H) 9.8 - 12.8 seconds    INR 1.4 (H) 0.9 - 1.1    aPTT 198 (HH) 27 - 38 seconds   Heparin Assay, UFH   Result Value Ref Range    Heparin Unfractionated 0.6 See Comment Below for Therapeutic Ranges IU/mL            Imaging           12/22 CT A/P with IV contrast  IMPRESSION:  No acute abdominopelvic process.      Nonocclusive thrombus within the superior mesenteric vein, new from  07/02/2023.      Hepatic cirrhosis with similar findings of portal hypertension  including large volume ascites, splenomegaly, and recanalized  umbilical vein.      Enlarged ovaries with multiple cysts, grossly stable from 07/02/2023,  however new from 05/26/2022. Further  evaluation nonemergent pelvic  ultrasound is recommended, if not already performed.      Stable biliary duct dilatation with intermediate density debris  within the common bile duct. Choledocholithiasis not excluded. Please  correlate clinically for biliary obstruction and consider further  evaluation with ERCP or MRCP if clinically indicated.                                                                             GI Procedures      8/2022 EGD  Findings:       The Z-line was regular and was found 40 cm from the incisors.       Three columns of large (> 5 mm) varices were found in the lower third of        the esophagus. Red diana signs were present. Banding was not attempted        due to patient's intolerance to anesthesia.       Moderate portal hypertensive gastropathy was found in the stomach.       One small semi-sessile polyp with no bleeding and no stigmata of recent        bleeding was found in the gastric fundus.       There is no endoscopic evidence of varices in the stomach.       The examined duodenum was normal.       The exam was otherwise without abnormality.  Impression:    - Z-line regular, 40 cm from the incisors.                         - Large (> 5 mm) esophageal varices.                         - Portal hypertensive gastropathy.                         - Normal examined duodenum.                         - No specimens collected.     8/2022 Colonoscopy  Findings:       The perianal and digital rectal examinations were normal.       A small polyp was found in the cecum. The polyp was sessile. The polyp        was removed with a cold biopsy forceps. Resection and retrieval were        complete. The pathology specimen was placed into Bottle A.       A 10 mm polyp was found in the descending colon. The polyp was        semi-sessile. The polyp was removed with a cold snare. Resection and        retrieval were complete. The pathology specimen was placed into Bottle B.       A 14 mm polyp was found in  the sigmoid colon. The polyp was        semi-pedunculated. Area was successfully injected with 3 mL of a 0.1        mg/mL solution of epinephrine for a lift polypectomy. The polyp was        removed with a hot snare. Resection and retrieval were complete. The        pathology specimen was placed into Bottle C.       A small polyp was found in the rectum. The polyp was hyperplastic.        Polypectomy wasn't attempted.       Internal hemorrhoids were found during retroflexion. The hemorrhoids        were small.    Moderate Sedation:       Moderate (conscious) sedation was administered by the endoscopy nurse        and supervised by the endoscopist. The patient's oxygen saturation,        heart rate, blood pressure and response to care were monitored. Total        physician intraservice time was 57 minutes.    FINAL DIAGNOSIS   A. CECUM, POLYP, POLYPECTOMY:   -- TUBULAR ADENOMA.     B. DESCENDING COLON, POLYP, POLYPECTOMY:   -- TRADITIONAL SERRATED ADENOMA.     C. SIGMOID COLON, POLYP, POLYPECTOMY:   -- INFLAMMATORY-TYPE POLYP WITH SURFACE ULCERATION.   -- NEGATIVE FOR DYSPLASIA.      ASSESSMENT / PLAN                  ASSESSMENT/PLAN:  Ms. Moreno is a 63 y.o. female with a past medical history of decompensated EtOH associated cirrhosis c/b large EV and ascites, ongoing AUD, bipolar disorder, hx of nephrolithiasis, and DLD, admitted on 12/22/2023 with right flank pain.     Hepatology is consulted for decompensated cirrhosis.      #Decompensated EtOH Associated Cirrhosis  #AUD  MELD 3.0: 18 at 12/24/2023  7:48 AM  MELD-Na: 15 at 12/24/2023  7:48 AM  Calculated from:  Serum Creatinine: 0.59 mg/dL (Using min of 1 mg/dL) at 12/24/2023  7:48 AM  Serum Sodium: 135 mmol/L at 12/24/2023  7:48 AM  Total Bilirubin: 2.3 mg/dL at 12/23/2023  5:00 PM  Serum Albumin: 2.2 g/dL at 12/24/2023  7:48 AM  INR(ratio): 1.4 at 12/24/2023  7:48 AM  Age at listing (hypothetical): 63 years  Sex: Female at 12/24/2023  7:48 AM  - Ascites:  Present; paracentesis q2 weekly, last on the 12/21  - Volume: overloaded  - EV: last EGD in 8/2022 showed large EV, not eradicated  - HE: No prior history  - HCC screening: CT A/P in 12/2023 no lesions; 5/2023 AFP 5  - Transplant: still actively drinking, serum ethanol 18 on admission     #SMV thrombus  -incidentally seen on CT A/P. Suspect this is a chronic finding and not related to her reports of flank pain. Additionally, the clot is non-occlusive.  -Vascular surgery consulted and recommended anticoagulation.  -Would recommend AGAINST anticoagulation at this time in the setting of known large esophageal varices in 8/2022 and ongoing AUD. The risks of anticoagulation likely outweigh the benefits in this case.     Recommendations:  -Consider diagnostic paracentesis to rule out SBP  -Please send AFP  -Continue Spironolactone 50mg. Please give 25g of albumin followed by Lasix 20mg IVP BID.  -Strict I&Os.  -Agree with holding Coreg 6.25mg BID.  -Plan for tentative EGD on the 12/26/23 for variceal ligation. Please make sure the patient is NPO 12/25/23 midnight -> 12/26/23.  -Consider CIWA given ongoing alcohol abuse.  -Please consult social work to provide the patient with resources for IOP.  -Please consult nutrition to educate the patient on 2g Na diet.  -When the patient is ready for discharge she will need outpatient follow-up with Dr. Guillaume (hepatology).    The above recommendations were communicated to Dr. Girard.    Patient was seen and discussed with Dr. Guillaume.    Thank you for the consultation. Hepatology will continue to follow.  During weekday hours of 7am-5pm, please do not hesitate to contact me on [employer] Chat or page [phone], if there are any further questions.  After hours, on weekends, and on holidays, please page the on-call GI fellow at [phone].   Thank you.     Su Munoz MD  PGY-4 Gastroenterology and Hepatology Fellow  [facility]

## 2023-12-24 NOTE — HOSPITAL COURSE
Yolie Moreno is a 63 y.o. female with a past medical history of Hep C, alcoholic cirrhosis c/b ascites, ETOH abuse, bipolar II disorder with psychotic features, MDD, dysplipidemia, and esophageal varices who presented with right flank pain and SI.  Patient had recent large-volume paracentesis, no issues.  Had increasing pain and told PCP she felt suicidal and presented to ED.  No longer suicidal, just difficulty handling pain.  She is actively using alcohol. Labs obtained on admit notable for hypokalemia, UTI, elevated LFTs, hypocalcemia.  Imaging obtained while in the ED showed superior mesenteric vein thromobosis and persistent large volume ascites. Vascular surgery was consulted by ED provider due to findings seen on imaging and recommended anticoagulation.  Patient was started on heparin drip, started on ceftriaxone for UTI, this will also cover SBP follow-up prophylaxis.  Hepatology consulted, suggest likely chronic finding and recommend stopping anticoagulation as risks greater than benefits given large varices.  Plan to bring patient to EGD on 12/26.

## 2023-12-24 NOTE — CARE PLAN
The patient's goals for the shift include      The clinical goals for the shift include Remain safe and injury free.      Problem: Fall/Injury  Goal: Not fall by end of shift  Outcome: Progressing  Goal: Be free from injury by end of the shift  Outcome: Progressing  Goal: Verbalize understanding of personal risk factors for fall in the hospital  Outcome: Progressing  Goal: Verbalize understanding of risk factor reduction measures to prevent injury from fall in the home  Outcome: Progressing  Goal: Use assistive devices by end of the shift  Outcome: Progressing  Goal: Pace activities to prevent fatigue by end of the shift  Outcome: Progressing

## 2023-12-24 NOTE — PROGRESS NOTES
Assessment/Plan   Yolie Moreno is a 63 y.o. female with a past medical history of Hep C, alcoholic cirrhosis c/b ascites, ETOH abuse, bipolar II disorder with psychotic features, MDD, dysplipidemia, and esophageal varices who presented with right flank pain and SI.  Patient had recent large-volume paracentesis, no issues.  Had increasing pain and told PCP she felt suicidal and presented to ED.  No longer suicidal, just difficulty handling pain.  She is actively using alcohol. Labs obtained on admit notable for hypokalemia, UTI, elevated LFTs, hypocalcemia.  Imaging obtained while in the ED showed superior mesenteric vein thromobosis and persistent large volume ascites. Vascular surgery was consulted by ED provider due to findings seen on imaging and recommended anticoagulation.  Patient was started on heparin drip, started on ceftriaxone for UTI, this will also cover SBP follow-up prophylaxis.  Hepatology consulted, suggest likely chronic finding and recommend stopping anticoagulation as risks greater than benefits given large varices.  Plan to bring patient to EGD on 12/26..    #suicidal ideation  #bipolar II disorder  - suicide and elopement precautions in place  - EPAT consulted and pt meets criteria for inpatient psych admission.  Further evaluation per psych does not meet criteria.  Patient's statements related to pain, and without intent, difficulty coping though.  Currently does not endorse any SI psychiatry states does not meet inpatient criteria.  Will continue one-to-one   -Will continue patient safety tray, ordered  - continue home dose of Abilify 5 mg PO every day (consider switching to alternate medication since pt does not like feeling too sleepy while on this med)     #Decompensated alcoholic cirrhosis complicated by ascites and esophageal varices  #superior mesenteric vein thrombosis  #Alcohol use disorder  #Hep C  #elevated LFTs (stable)  -Likely chronic, likely incidental finding related to her  decompensated cirrhosis and ascites.  -Hepatology strongly recommending against anticoagulation due to significant risk of bleed if greater than the benefits this time.  - will need to discuss further with vascular medicine as well  -Patient will have an EGD on 12/26  -Per hepatology ordered 25 g of albumin and then will start Lasix 20 mg IV twice daily and continue Aldactone.  -Held lengthy care discussion and counseling provided regarding diet, fluid management, alcohol use, complications of cirrhosis, preventative management.  Nutrition is consulted.  Will need to continue to work with patient.  -Would like paracentesis per hepatology, will need to be ordered after the holiday, patient is already on antibiotics for UTI as well will decide tomorrow before Elective procedures on Tuesday  -Added on AFP  -Social work consulted for substance resources    #UTI  - UA: +2 leukocyte esterase, +nitrite, 21-50 WBCs, 1-2 RBC Urine culture: Pending results with sensitivities   - Prior Urine culture result (6/25/23): E.coli >100,000 CFU/ml sensitive to Ampicillin, Ancef, Rocephin, Zosyn, Bactrim DS, Levaquin, Gentamicin, and Cipro  - Antibiotic Regimen: Rocephin 1g Q24hr (12/22-). Transition to PO antibiotic at discharge   - Monitor kidney function; Provide maintenance fluids if needed; Encourage oral hydration      #hypokalemia  - likely d/t diuretic use outpatient  -Monitor RFP     #anemia (stable)  -Stable hemoglobin  - continue to monitor CBC       - AST 78 Alk P 142 total bili 1.6 on admit, AST 85 Alk P 170 total bili 1.9 on 12/7  - consider RUQ ultrasound if LFTs start to uptrend  - last paracentesis 12/21: 6300 ml removed  - MELD score: 11  - continue home dose of Lasix 40 mg PO every day and Aldactone 50 mg PO every day  - 2g Na diet, 2L FR  - daily weight  - Hep C + 5/2022, viral load undetectable  - HCV viral load undetectable 5/2023        #HTN  - last /80  - continue to monitor BP  - continue home dose of  "Coreg 6.25 mg PO BID (hold for SBP<110 or HR<60)     #dyslipidemia  - continue home dose of Lipitor 20 mg PO at bedtime     #ETOH abuse  #cocaine abuse  - Ethanol level 18 on admit  - Utox  negative  - Utox +cocaine on 8/29/83  - continue home dose of Thiamine 100 mg PO every day and Folic acid 1 mg PO QD     Scheduled outpatient appointments in system:   Future Appointments   Date Time Provider Department Center   12/26/2023  7:00 AM [facility] GI ADD-ONS [id number] Academic   1/3/2024  3:30 PM Faby Wakefield MD [id number] Academic   1/18/2024  1:30 PM [facility] [id number] NUTRIT FFL DIET [id number] Academic   1/31/2024  3:00 PM Gene Guillaume MD [id number] Academic     ---------------------------------------------------------------------------------------------------  Subjective   Pain is improving, she reports some improvement in her abdominal distention.  No nausea or vomiting, tolerating diet.  Held Long counseling session today.  Patient verbalized understanding will need more education.  She is agreeable to hearing about resources.     ---------------------------------------------------------------------------------------------------  Objective   Last Recorded Vitals  Blood pressure 96/59, pulse 91, temperature 36.3 °C (97.3 °F), resp. rate 16, height 1.651 m (5' 5\"), weight 63.5 kg (140 lb), SpO2 100 %.  Intake/Output last 3 Shifts:  I/O last 3 completed shifts:  In: 315.6 (5 mL/kg) [I.V.:215.6 (3.4 mL/kg); IV Piggyback:100]  Out: - (0 mL/kg)   Weight: 63.5 kg     Physical Exam  Vitals and nursing note reviewed.   Constitutional:       General: She is not in acute distress.     Appearance: Normal appearance. She is ill-appearing. She is not toxic-appearing.   HENT:      Head: Normocephalic and atraumatic.      Mouth/Throat:      Mouth: Mucous membranes are moist.   Eyes:      General: No scleral icterus.     Extraocular Movements: Extraocular movements intact.      Conjunctiva/sclera: Conjunctivae normal. "   Cardiovascular:      Rate and Rhythm: Normal rate and regular rhythm.      Heart sounds: S1 normal and S2 normal. No murmur heard.  Pulmonary:      Effort: Pulmonary effort is normal. No respiratory distress.      Breath sounds: No wheezing, rhonchi or rales.   Abdominal:      General: Bowel sounds are normal. There is distension.      Palpations: Abdomen is soft.      Tenderness: There is no abdominal tenderness. There is no guarding or rebound.      Comments: Less firm   Musculoskeletal:         General: No swelling or deformity.      Cervical back: Neck supple.   Skin:     General: Skin is warm and dry.      Findings: No rash.   Neurological:      General: No focal deficit present.      Mental Status: She is alert. Mental status is at baseline.   Psychiatric:         Mood and Affect: Mood normal.         Relevant Results  Lab Results   Component Value Date    WBC 5.8 12/24/2023    HGB 9.7 (L) 12/24/2023    HCT 30.3 (L) 12/24/2023    MCV 88 12/24/2023    PLT 93 (L) 12/24/2023      Lab Results   Component Value Date    GLUCOSE 92 12/24/2023    CALCIUM 7.6 (L) 12/24/2023     (L) 12/24/2023    K 4.2 12/24/2023    CO2 23 12/24/2023     12/24/2023    BUN 8 12/24/2023    CREATININE 0.59 12/24/2023     Scheduled medications  albumin human, 25 g, intravenous, Once  ARIPiprazole, 5 mg, oral, Daily  atorvastatin, 20 mg, oral, Nightly  carvedilol, 6.25 mg, oral, BID  cefTRIAXone, 1 g, intravenous, q24h  folic acid, 1 mg, oral, Daily  furosemide, 20 mg, intravenous, q12h  sennosides-docusate sodium, 2 tablet, oral, BID  spironolactone, 50 mg, oral, Daily  thiamine, 100 mg, oral, Daily      Continuous medications  [Held by provider] heparin, 0-4,500 Units/hr, Last Rate: 1,000 Units/hr (12/24/23 1014)      PRN medications  PRN medications: acetaminophen, heparin, melatonin, polyethylene glycol    Abel Girard MD

## 2023-12-25 LAB
ALBUMIN SERPL BCP-MCNC: 2.6 G/DL (ref 3.4–5)
ALP SERPL-CCNC: 101 U/L (ref 33–136)
ALT SERPL W P-5'-P-CCNC: 34 U/L (ref 7–45)
AST SERPL W P-5'-P-CCNC: 49 U/L (ref 9–39)
BACTERIA UR CULT: ABNORMAL
BILIRUB DIRECT SERPL-MCNC: 0.5 MG/DL (ref 0–0.3)
BILIRUB SERPL-MCNC: 1.1 MG/DL (ref 0–1.2)
INR PPP: 1.4 (ref 0.9–1.1)
PROT SERPL-MCNC: 5.9 G/DL (ref 6.4–8.2)
PROTHROMBIN TIME: 16 SECONDS (ref 9.8–12.8)

## 2023-12-25 PROCEDURE — 99233 SBSQ HOSP IP/OBS HIGH 50: CPT | Performed by: INTERNAL MEDICINE

## 2023-12-25 PROCEDURE — 84075 ASSAY ALKALINE PHOSPHATASE: CPT | Performed by: STUDENT IN AN ORGANIZED HEALTH CARE EDUCATION/TRAINING PROGRAM

## 2023-12-25 PROCEDURE — 99234 HOSP IP/OBS SM DT SF/LOW 45: CPT | Performed by: STUDENT IN AN ORGANIZED HEALTH CARE EDUCATION/TRAINING PROGRAM

## 2023-12-25 PROCEDURE — 85610 PROTHROMBIN TIME: CPT | Performed by: STUDENT IN AN ORGANIZED HEALTH CARE EDUCATION/TRAINING PROGRAM

## 2023-12-25 PROCEDURE — 2500000004 HC RX 250 GENERAL PHARMACY W/ HCPCS (ALT 636 FOR OP/ED): Performed by: NURSE PRACTITIONER

## 2023-12-25 PROCEDURE — 2500000001 HC RX 250 WO HCPCS SELF ADMINISTERED DRUGS (ALT 637 FOR MEDICARE OP): Performed by: NURSE PRACTITIONER

## 2023-12-25 PROCEDURE — 2500000004 HC RX 250 GENERAL PHARMACY W/ HCPCS (ALT 636 FOR OP/ED): Performed by: STUDENT IN AN ORGANIZED HEALTH CARE EDUCATION/TRAINING PROGRAM

## 2023-12-25 PROCEDURE — 1100000001 HC PRIVATE ROOM DAILY

## 2023-12-25 PROCEDURE — 36415 COLL VENOUS BLD VENIPUNCTURE: CPT | Performed by: STUDENT IN AN ORGANIZED HEALTH CARE EDUCATION/TRAINING PROGRAM

## 2023-12-25 RX ADMIN — CEFTRIAXONE SODIUM 1 G: 1 INJECTION, SOLUTION INTRAVENOUS at 21:33

## 2023-12-25 RX ADMIN — FOLIC ACID 1 MG: 1 TABLET ORAL at 09:21

## 2023-12-25 RX ADMIN — THIAMINE HCL TAB 100 MG 100 MG: 100 TAB at 09:21

## 2023-12-25 RX ADMIN — ACETAMINOPHEN 650 MG: 325 TABLET ORAL at 11:31

## 2023-12-25 RX ADMIN — FUROSEMIDE 20 MG: 10 INJECTION, SOLUTION INTRAVENOUS at 20:37

## 2023-12-25 RX ADMIN — ARIPIPRAZOLE 5 MG: 5 TABLET ORAL at 09:21

## 2023-12-25 RX ADMIN — ATORVASTATIN CALCIUM 20 MG: 20 TABLET, FILM COATED ORAL at 20:37

## 2023-12-25 RX ADMIN — FUROSEMIDE 20 MG: 10 INJECTION, SOLUTION INTRAVENOUS at 09:21

## 2023-12-25 RX ADMIN — CARVEDILOL 6.25 MG: 12.5 TABLET, FILM COATED ORAL at 20:37

## 2023-12-25 RX ADMIN — Medication 5 MG: at 20:37

## 2023-12-25 ASSESSMENT — COGNITIVE AND FUNCTIONAL STATUS - GENERAL
MOBILITY SCORE: 24
MOBILITY SCORE: 24
DAILY ACTIVITIY SCORE: 24
DAILY ACTIVITIY SCORE: 24

## 2023-12-25 ASSESSMENT — PAIN SCALES - GENERAL
PAINLEVEL_OUTOF10: 0 - NO PAIN
PAINLEVEL_OUTOF10: 3

## 2023-12-25 ASSESSMENT — PAIN DESCRIPTION - LOCATION: LOCATION: ABDOMEN

## 2023-12-25 ASSESSMENT — PAIN - FUNCTIONAL ASSESSMENT: PAIN_FUNCTIONAL_ASSESSMENT: 0-10

## 2023-12-25 ASSESSMENT — PAIN DESCRIPTION - ORIENTATION: ORIENTATION: RIGHT;UPPER

## 2023-12-25 NOTE — PROGRESS NOTES
"Yolie Moreno is a 63 y.o. female on day 2 of admission presenting with Superior mesenteric vein thrombosis (CMS/HCC).    Subjective   Reports imporvement in abdominal distenion and pedal edema  Reports good urine output yesterday; I/O was not documented.        Objective     Physical Exam  Constitutional:       Appearance: Normal appearance.   HENT:      Head: Normocephalic and atraumatic.      Mouth/Throat:      Mouth: Mucous membranes are moist.   Cardiovascular:      Rate and Rhythm: Normal rate and regular rhythm.   Pulmonary:      Effort: Pulmonary effort is normal.      Breath sounds: Normal breath sounds.   Abdominal:      General: Bowel sounds are normal. There is distension.   Musculoskeletal:      Cervical back: Normal range of motion.      Right lower leg: Edema present.      Left lower leg: Edema present.   Skin:     General: Skin is warm.      Capillary Refill: Capillary refill takes less than 2 seconds.   Neurological:      Mental Status: She is alert.         Last Recorded Vitals  Blood pressure 105/67, pulse 81, temperature 37.1 °C (98.8 °F), resp. rate 21, height 1.651 m (5' 5\"), weight 63.5 kg (140 lb), SpO2 100 %.  Intake/Output last 3 Shifts:  I/O last 3 completed shifts:  In: 417.9 (6.6 mL/kg) [I.V.:317.9 (5 mL/kg); IV Piggyback:100]  Out: - (0 mL/kg)   Weight: 63.5 kg     Relevant ResultsAssessment/Plan   Principal Problem:    Superior mesenteric vein thrombosis (CMS/HCC)    Ms. Moreno is a 63 y.o. female with a past medical history of decompensated EtOH associated cirrhosis c/b large EV and ascites, ongoing AUD, bipolar disorder, hx of nephrolithiasis, and DLD, admitted on 12/22/2023 with right flank pain.      Hepatology is consulted for decompensated cirrhosis.      #Decompensated EtOH Associated Cirrhosis  #AUD  MELD 3.0: 14 at 12/25/2023  5:47 AM  MELD-Na: 11 at 12/25/2023  5:47 AM  Calculated from:  Serum Creatinine: 0.59 mg/dL (Using min of 1 mg/dL) at 12/24/2023  7:48 AM  Serum " Sodium: 135 mmol/L at 12/24/2023  7:48 AM  Total Bilirubin: 1.1 mg/dL at 12/25/2023  5:47 AM  Serum Albumin: 2.6 g/dL at 12/25/2023  5:47 AM  INR(ratio): 1.4 at 12/25/2023  5:47 AM  Age at listing (hypothetical): 63 years  Sex: Female at 12/25/2023  5:47 AM    - Ascites: Present; paracentesis q2 weekly, last on the 12/21  - Volume: overloaded, needs paracentesis  - EV: last EGD in 8/2022 showed large EV, not eradicated  - HE: No prior history  - HCC screening: CT A/P in 12/2023 no lesions; 5/2023 AFP 5  - Transplant: still actively drinking, serum ethanol 18 on admission     #SMV thrombus  -incidentally seen on CT A/P. Suspect this is a chronic finding and not related to her reports of flank pain. Additionally, the clot is non-occlusive.  -Vascular surgery consulted and recommended anticoagulation.  -Would recommend AGAINST anticoagulation at this time in the setting of known large esophageal varices in 8/2022 and ongoing AUD. The risks of anticoagulation likely outweigh the benefits in this case.     Recommendations:  -Diagnostic and therapeutic paracentesis tomorrow   -Continue Spironolactone 50mg. Continue 25g of albumin followed by Lasix 20mg IVP BID.  -Strict I&Os.  -Agree with holding Coreg 6.25mg BID.  -EGD was offered but pt declined   -Consider CIWA given ongoing alcohol abuse.  -Please consult social work to provide the patient with resources for IOP.  -Please consult nutrition to educate the patient on 2g Na diet.  -When the patient is ready for discharge she will need outpatient follow-up with Dr. Guillaume (hepatology).     Joao Spencer MD

## 2023-12-25 NOTE — H&P (VIEW-ONLY)
Assessment/Plan       63-year-old Hep C, alcoholic cirrhosis c/b ascites, ETOH abuse, bipolar II disorder with psychotic features, MDD, dysplipidemia, and esophageal varices who presented with right flank pain and SI.  Has decompensated alcoholic cirrhosis.  Active alcohol use.  UTI.  On ceftriaxone.  Hepatology consulted.  Has psych consulted for SI.  - Paracentesis frequent for her ascites, hepatology recommending pursuing another paracentesis, would double check with them again.  She had 1 recent, appears to be getting better, she is already on ceftriaxone for UTI.  - Gave albumin, starting IV Lasix twice daily with same dose of Aldactone.  - Hepatology strongly against anticoagulation.  May need to discuss further with vascular medicine.  Will be more informative after she has EGD, they are planning on Tuesday.    - Needs substance abuse resources, I counseled nutrition regarding her diet and salt restriction needs and education.    - Uti -follow-up cultures and narrow or stop after 5 days  - Dispo is pending, home versus rehab.  will make sure social work talks to her at some point so we can plan there is been no discharge planning started  - Diego sanchez, psych team signed off       .    #suicidal ideation  #bipolar II disorder  - suicide and elopement precautions in place  - EPAT consulted and pt meets criteria for inpatient psych admission.  Further evaluation per psych does not meet criteria.  Patient's statements related to pain, and without intent, difficulty coping though.  Currently does not endorse any SI psychiatry states does not meet inpatient criteria.  Will continue one-to-one   -Will continue patient safety tray, ordered  - continue home dose of Abilify 5 mg PO every day (consider switching to alternate medication since pt does not like feeling too sleepy while on this med)     -Diego sanchez 12/25, psych team signed off       #Decompensated alcoholic cirrhosis complicated by ascites and esophageal  varices  #superior mesenteric vein thrombosis  #Alcohol use disorder  #Hep C  #elevated LFTs (stable)  -Likely chronic, likely incidental finding related to her decompensated cirrhosis and ascites.  -Hepatology strongly recommending against anticoagulation due to significant risk of bleed if greater than the benefits this time.  - will need to discuss further with vascular medicine as well  -Patient will have an EGD on 12/26  -Per hepatology ordered 25 g of albumin and then will start Lasix 20 mg IV twice daily and continue Aldactone.  -Held lengthy care discussion and counseling provided regarding diet, fluid management, alcohol use, complications of cirrhosis, preventative management.  Nutrition is consulted.  Will need to continue to work with patient.  -Would like paracentesis per hepatology, will need to be ordered after the holiday, patient is already on antibiotics for UTI as well will decide tomorrow before Elective procedures on Tuesday  -Added on AFP  -Social work consulted for substance resources    #UTI  - UA: +2 leukocyte esterase, +nitrite, 21-50 WBCs, 1-2 RBC Urine culture: Pending results with sensitivities   - Prior Urine culture result (6/25/23): E.coli >100,000 CFU/ml sensitive to Ampicillin, Ancef, Rocephin, Zosyn, Bactrim DS, Levaquin, Gentamicin, and Cipro  - Antibiotic Regimen: Rocephin 1g Q24hr (12/22-). Transition to PO antibiotic at discharge   - Monitor kidney function; Provide maintenance fluids if needed; Encourage oral hydration      #hypokalemia  - likely d/t diuretic use outpatient  -Monitor RFP     #anemia (stable)  -Stable hemoglobin  - continue to monitor CBC       - AST 78 Alk P 142 total bili 1.6 on admit, AST 85 Alk P 170 total bili 1.9 on 12/7  - consider RUQ ultrasound if LFTs start to uptrend  - last paracentesis 12/21: 6300 ml removed  - MELD score: 11  - continue home dose of Lasix 40 mg PO every day and Aldactone 50 mg PO every day  - 2g Na diet, 2L FR  - daily weight  -  "Hep C + 5/2022, viral load undetectable  - HCV viral load undetectable 5/2023        #HTN  - last /80  - continue to monitor BP  - continue home dose of Coreg 6.25 mg PO BID (hold for SBP<110 or HR<60)     #dyslipidemia  - continue home dose of Lipitor 20 mg PO at bedtime     #ETOH abuse  #cocaine abuse  - Ethanol level 18 on admit  - Utox  negative  - Utox +cocaine on 8/29/83  - continue home dose of Thiamine 100 mg PO every day and Folic acid 1 mg PO QD     Scheduled outpatient appointments in system:   Future Appointments   Date Time Provider Department Center   12/26/2023  7:00 AM [facility] GI ADD-ONS [id number] Academic   1/3/2024  3:30 PM Faby Wakefield MD [id number] Academic   1/18/2024  1:30 PM [facility] [id number] NUTRIT FFL DIET [id number] Academic   1/31/2024  3:00 PM Gene Guillaume MD [id number] Academic     ---------------------------------------------------------------------------------------------------  Subjective      ---------------------------------------------------------------------------------------------------  Objective   Last Recorded Vitals  Blood pressure 105/67, pulse 81, temperature 37.1 °C (98.8 °F), resp. rate 21, height 1.651 m (5' 5\"), weight 63.5 kg (140 lb), SpO2 100 %.  Intake/Output last 3 Shifts:  I/O last 3 completed shifts:  In: 417.9 (6.6 mL/kg) [I.V.:317.9 (5 mL/kg); IV Piggyback:100]  Out: - (0 mL/kg)   Weight: 63.5 kg     Physical Exam  Vitals and nursing note reviewed.   Constitutional:       General: She is not in acute distress.     Appearance: Normal appearance. She is ill-appearing. She is not toxic-appearing.   HENT:      Head: Normocephalic and atraumatic.      Mouth/Throat:      Mouth: Mucous membranes are moist.   Eyes:      General: No scleral icterus.     Extraocular Movements: Extraocular movements intact.      Conjunctiva/sclera: Conjunctivae normal.   Cardiovascular:      Rate and Rhythm: Normal rate and regular rhythm.      Heart sounds: S1 normal and S2 " normal. No murmur heard.  Pulmonary:      Effort: Pulmonary effort is normal. No respiratory distress.      Breath sounds: No wheezing, rhonchi or rales.   Abdominal:      General: Bowel sounds are normal. There is distension.      Palpations: Abdomen is soft.      Tenderness: There is no abdominal tenderness. There is no guarding or rebound.      Comments: Less firm   Musculoskeletal:         General: No swelling or deformity.      Cervical back: Neck supple.   Skin:     General: Skin is warm and dry.      Findings: No rash.   Neurological:      General: No focal deficit present.      Mental Status: She is alert. Mental status is at baseline.   Psychiatric:         Mood and Affect: Mood normal.         Relevant Results  Lab Results   Component Value Date    WBC 5.8 12/24/2023    HGB 9.7 (L) 12/24/2023    HCT 30.3 (L) 12/24/2023    MCV 88 12/24/2023    PLT 93 (L) 12/24/2023      Lab Results   Component Value Date    GLUCOSE 92 12/24/2023    CALCIUM 7.6 (L) 12/24/2023     (L) 12/24/2023    K 4.2 12/24/2023    CO2 23 12/24/2023     12/24/2023    BUN 8 12/24/2023    CREATININE 0.59 12/24/2023     Scheduled medications  ARIPiprazole, 5 mg, oral, Daily  atorvastatin, 20 mg, oral, Nightly  carvedilol, 6.25 mg, oral, BID  cefTRIAXone, 1 g, intravenous, q24h  folic acid, 1 mg, oral, Daily  furosemide, 20 mg, intravenous, q12h  sennosides-docusate sodium, 2 tablet, oral, BID  spironolactone, 50 mg, oral, Daily  thiamine, 100 mg, oral, Daily      Continuous medications  [Held by provider] heparin, 0-4,500 Units/hr, Last Rate: Stopped (12/24/23 1700)      PRN medications  PRN medications: acetaminophen, heparin, melatonin, polyethylene glycol    Lily Jesus MD

## 2023-12-25 NOTE — PROGRESS NOTES
Assessment/Plan       63-year-old Hep C, alcoholic cirrhosis c/b ascites, ETOH abuse, bipolar II disorder with psychotic features, MDD, dysplipidemia, and esophageal varices who presented with right flank pain and SI.  Has decompensated alcoholic cirrhosis.  Active alcohol use.  UTI.  On ceftriaxone.  Hepatology consulted.  Has psych consulted for SI.  - Paracentesis frequent for her ascites, hepatology recommending pursuing another paracentesis, would double check with them again.  She had 1 recent, appears to be getting better, she is already on ceftriaxone for UTI.  - Gave albumin, starting IV Lasix twice daily with same dose of Aldactone.  - Hepatology strongly against anticoagulation.  May need to discuss further with vascular medicine.  Will be more informative after she has EGD, they are planning on Tuesday.    - Needs substance abuse resources, I counseled nutrition regarding her diet and salt restriction needs and education.    - Uti -follow-up cultures and narrow or stop after 5 days  - Dispo is pending, home versus rehab.  will make sure social work talks to her at some point so we can plan there is been no discharge planning started  - Diego sanchez, psych team signed off       .    #suicidal ideation  #bipolar II disorder  - suicide and elopement precautions in place  - EPAT consulted and pt meets criteria for inpatient psych admission.  Further evaluation per psych does not meet criteria.  Patient's statements related to pain, and without intent, difficulty coping though.  Currently does not endorse any SI psychiatry states does not meet inpatient criteria.  Will continue one-to-one   -Will continue patient safety tray, ordered  - continue home dose of Abilify 5 mg PO every day (consider switching to alternate medication since pt does not like feeling too sleepy while on this med)     -Diego sanchez 12/25, psych team signed off       #Decompensated alcoholic cirrhosis complicated by ascites and esophageal  varices  #superior mesenteric vein thrombosis  #Alcohol use disorder  #Hep C  #elevated LFTs (stable)  -Likely chronic, likely incidental finding related to her decompensated cirrhosis and ascites.  -Hepatology strongly recommending against anticoagulation due to significant risk of bleed if greater than the benefits this time.  - will need to discuss further with vascular medicine as well  -Patient will have an EGD on 12/26  -Per hepatology ordered 25 g of albumin and then will start Lasix 20 mg IV twice daily and continue Aldactone.  -Held lengthy care discussion and counseling provided regarding diet, fluid management, alcohol use, complications of cirrhosis, preventative management.  Nutrition is consulted.  Will need to continue to work with patient.  -Would like paracentesis per hepatology, will need to be ordered after the holiday, patient is already on antibiotics for UTI as well will decide tomorrow before Elective procedures on Tuesday  -Added on AFP  -Social work consulted for substance resources    #UTI  - UA: +2 leukocyte esterase, +nitrite, 21-50 WBCs, 1-2 RBC Urine culture: Pending results with sensitivities   - Prior Urine culture result (6/25/23): E.coli >100,000 CFU/ml sensitive to Ampicillin, Ancef, Rocephin, Zosyn, Bactrim DS, Levaquin, Gentamicin, and Cipro  - Antibiotic Regimen: Rocephin 1g Q24hr (12/22-). Transition to PO antibiotic at discharge   - Monitor kidney function; Provide maintenance fluids if needed; Encourage oral hydration      #hypokalemia  - likely d/t diuretic use outpatient  -Monitor RFP     #anemia (stable)  -Stable hemoglobin  - continue to monitor CBC       - AST 78 Alk P 142 total bili 1.6 on admit, AST 85 Alk P 170 total bili 1.9 on 12/7  - consider RUQ ultrasound if LFTs start to uptrend  - last paracentesis 12/21: 6300 ml removed  - MELD score: 11  - continue home dose of Lasix 40 mg PO every day and Aldactone 50 mg PO every day  - 2g Na diet, 2L FR  - daily weight  -  "Hep C + 5/2022, viral load undetectable  - HCV viral load undetectable 5/2023        #HTN  - last /80  - continue to monitor BP  - continue home dose of Coreg 6.25 mg PO BID (hold for SBP<110 or HR<60)     #dyslipidemia  - continue home dose of Lipitor 20 mg PO at bedtime     #ETOH abuse  #cocaine abuse  - Ethanol level 18 on admit  - Utox  negative  - Utox +cocaine on 8/29/83  - continue home dose of Thiamine 100 mg PO every day and Folic acid 1 mg PO QD     Scheduled outpatient appointments in system:   Future Appointments   Date Time Provider Department Center   12/26/2023  7:00 AM [facility] GI ADD-ONS [id number] Academic   1/3/2024  3:30 PM Faby Wakefield MD [id number] Academic   1/18/2024  1:30 PM [facility] [id number] NUTRIT FFL DIET [id number] Academic   1/31/2024  3:00 PM Gene Guillaume MD [id number] Academic     ---------------------------------------------------------------------------------------------------  Subjective      ---------------------------------------------------------------------------------------------------  Objective   Last Recorded Vitals  Blood pressure 105/67, pulse 81, temperature 37.1 °C (98.8 °F), resp. rate 21, height 1.651 m (5' 5\"), weight 63.5 kg (140 lb), SpO2 100 %.  Intake/Output last 3 Shifts:  I/O last 3 completed shifts:  In: 417.9 (6.6 mL/kg) [I.V.:317.9 (5 mL/kg); IV Piggyback:100]  Out: - (0 mL/kg)   Weight: 63.5 kg     Physical Exam  Vitals and nursing note reviewed.   Constitutional:       General: She is not in acute distress.     Appearance: Normal appearance. She is ill-appearing. She is not toxic-appearing.   HENT:      Head: Normocephalic and atraumatic.      Mouth/Throat:      Mouth: Mucous membranes are moist.   Eyes:      General: No scleral icterus.     Extraocular Movements: Extraocular movements intact.      Conjunctiva/sclera: Conjunctivae normal.   Cardiovascular:      Rate and Rhythm: Normal rate and regular rhythm.      Heart sounds: S1 normal and S2 " normal. No murmur heard.  Pulmonary:      Effort: Pulmonary effort is normal. No respiratory distress.      Breath sounds: No wheezing, rhonchi or rales.   Abdominal:      General: Bowel sounds are normal. There is distension.      Palpations: Abdomen is soft.      Tenderness: There is no abdominal tenderness. There is no guarding or rebound.      Comments: Less firm   Musculoskeletal:         General: No swelling or deformity.      Cervical back: Neck supple.   Skin:     General: Skin is warm and dry.      Findings: No rash.   Neurological:      General: No focal deficit present.      Mental Status: She is alert. Mental status is at baseline.   Psychiatric:         Mood and Affect: Mood normal.         Relevant Results  Lab Results   Component Value Date    WBC 5.8 12/24/2023    HGB 9.7 (L) 12/24/2023    HCT 30.3 (L) 12/24/2023    MCV 88 12/24/2023    PLT 93 (L) 12/24/2023      Lab Results   Component Value Date    GLUCOSE 92 12/24/2023    CALCIUM 7.6 (L) 12/24/2023     (L) 12/24/2023    K 4.2 12/24/2023    CO2 23 12/24/2023     12/24/2023    BUN 8 12/24/2023    CREATININE 0.59 12/24/2023     Scheduled medications  ARIPiprazole, 5 mg, oral, Daily  atorvastatin, 20 mg, oral, Nightly  carvedilol, 6.25 mg, oral, BID  cefTRIAXone, 1 g, intravenous, q24h  folic acid, 1 mg, oral, Daily  furosemide, 20 mg, intravenous, q12h  sennosides-docusate sodium, 2 tablet, oral, BID  spironolactone, 50 mg, oral, Daily  thiamine, 100 mg, oral, Daily      Continuous medications  [Held by provider] heparin, 0-4,500 Units/hr, Last Rate: Stopped (12/24/23 1700)      PRN medications  PRN medications: acetaminophen, heparin, melatonin, polyethylene glycol    Lily Jesus MD

## 2023-12-25 NOTE — CARE PLAN
Problem: Fall/Injury  Goal: Not fall by end of shift  Outcome: Progressing  Goal: Be free from injury by end of the shift  Outcome: Progressing  Goal: Verbalize understanding of personal risk factors for fall in the hospital  Outcome: Progressing     Problem: Pain - Adult  Goal: Verbalizes/displays adequate comfort level or baseline comfort level  Outcome: Progressing     Problem: Safety - Adult  Goal: Free from fall injury  Outcome: Progressing     Problem: Discharge Planning  Goal: Discharge to home or other facility with appropriate resources  Outcome: Progressing   The patient's goals for the shift include      The clinical goals for the shift include pt will remain safe through shift

## 2023-12-26 ENCOUNTER — ANESTHESIA EVENT (OUTPATIENT)
Dept: GASTROENTEROLOGY | Facility: HOSPITAL | Age: 63
End: 2023-12-26

## 2023-12-26 ENCOUNTER — ANESTHESIA (OUTPATIENT)
Dept: GASTROENTEROLOGY | Facility: HOSPITAL | Age: 63
End: 2023-12-26
Payer: COMMERCIAL

## 2023-12-26 ENCOUNTER — APPOINTMENT (OUTPATIENT)
Dept: RADIOLOGY | Facility: HOSPITAL | Age: 63
End: 2023-12-26
Payer: COMMERCIAL

## 2023-12-26 VITALS
WEIGHT: 140 LBS | SYSTOLIC BLOOD PRESSURE: 86 MMHG | TEMPERATURE: 98.1 F | HEART RATE: 68 BPM | DIASTOLIC BLOOD PRESSURE: 48 MMHG | HEIGHT: 65 IN | OXYGEN SATURATION: 100 % | RESPIRATION RATE: 16 BRPM | BODY MASS INDEX: 23.32 KG/M2

## 2023-12-26 LAB
ALBUMIN SERPL BCP-MCNC: 2.5 G/DL (ref 3.4–5)
ALP SERPL-CCNC: 115 U/L (ref 33–136)
ALT SERPL W P-5'-P-CCNC: 33 U/L (ref 7–45)
AST SERPL W P-5'-P-CCNC: 53 U/L (ref 9–39)
BASOPHILS NFR FLD MANUAL: 0 %
BILIRUB DIRECT SERPL-MCNC: 0.4 MG/DL (ref 0–0.3)
BILIRUB SERPL-MCNC: 0.8 MG/DL (ref 0–1.2)
BLASTS NFR FLD MANUAL: 0 %
CLARITY FLD: CLEAR
COLOR FLD: YELLOW
EOSINOPHIL NFR FLD MANUAL: 0 %
GLUCOSE FLD-MCNC: 110 MG/DL
IMMATURE GRANULOCYTES IN FLUID: 0 %
INR PPP: 1.3 (ref 0.9–1.1)
LDH FLD L TO P-CCNC: 49 U/L
LYMPHOCYTES NFR FLD MANUAL: 26 %
MONOS+MACROS NFR FLD MANUAL: 69 %
NEUTROPHILS NFR FLD MANUAL: 5 %
OTHER CELLS NFR FLD MANUAL: 0 %
PLASMA CELLS NFR FLD MANUAL: 0 %
PROT FLD-MCNC: 1.4 G/DL
PROT SERPL-MCNC: 5.7 G/DL (ref 6.4–8.2)
PROTHROMBIN TIME: 15.2 SECONDS (ref 9.8–12.8)
RBC # FLD AUTO: 30 /UL
TOTAL CELLS COUNTED FLD: 100
TRIGL FLD-MCNC: 44 MG/DL
WBC # FLD AUTO: 74 /UL

## 2023-12-26 PROCEDURE — 80076 HEPATIC FUNCTION PANEL: CPT | Performed by: STUDENT IN AN ORGANIZED HEALTH CARE EDUCATION/TRAINING PROGRAM

## 2023-12-26 PROCEDURE — 0W9G3ZZ DRAINAGE OF PERITONEAL CAVITY, PERCUTANEOUS APPROACH: ICD-10-PCS | Performed by: NURSE PRACTITIONER

## 2023-12-26 PROCEDURE — 99232 SBSQ HOSP IP/OBS MODERATE 35: CPT | Performed by: INTERNAL MEDICINE

## 2023-12-26 PROCEDURE — 84478 ASSAY OF TRIGLYCERIDES: CPT | Performed by: STUDENT IN AN ORGANIZED HEALTH CARE EDUCATION/TRAINING PROGRAM

## 2023-12-26 PROCEDURE — 83615 LACTATE (LD) (LDH) ENZYME: CPT | Performed by: STUDENT IN AN ORGANIZED HEALTH CARE EDUCATION/TRAINING PROGRAM

## 2023-12-26 PROCEDURE — P9047 ALBUMIN (HUMAN), 25%, 50ML: HCPCS | Mod: JZ | Performed by: STUDENT IN AN ORGANIZED HEALTH CARE EDUCATION/TRAINING PROGRAM

## 2023-12-26 PROCEDURE — 49083 ABD PARACENTESIS W/IMAGING: CPT

## 2023-12-26 PROCEDURE — 99239 HOSP IP/OBS DSCHRG MGMT >30: CPT | Performed by: STUDENT IN AN ORGANIZED HEALTH CARE EDUCATION/TRAINING PROGRAM

## 2023-12-26 PROCEDURE — 49083 ABD PARACENTESIS W/IMAGING: CPT | Performed by: NURSE PRACTITIONER

## 2023-12-26 PROCEDURE — 87070 CULTURE OTHR SPECIMN AEROBIC: CPT | Performed by: STUDENT IN AN ORGANIZED HEALTH CARE EDUCATION/TRAINING PROGRAM

## 2023-12-26 PROCEDURE — 2500000004 HC RX 250 GENERAL PHARMACY W/ HCPCS (ALT 636 FOR OP/ED): Performed by: NURSE PRACTITIONER

## 2023-12-26 PROCEDURE — 85610 PROTHROMBIN TIME: CPT | Performed by: STUDENT IN AN ORGANIZED HEALTH CARE EDUCATION/TRAINING PROGRAM

## 2023-12-26 PROCEDURE — 2500000001 HC RX 250 WO HCPCS SELF ADMINISTERED DRUGS (ALT 637 FOR MEDICARE OP): Performed by: NURSE PRACTITIONER

## 2023-12-26 PROCEDURE — 82945 GLUCOSE OTHER FLUID: CPT | Performed by: STUDENT IN AN ORGANIZED HEALTH CARE EDUCATION/TRAINING PROGRAM

## 2023-12-26 PROCEDURE — 2500000001 HC RX 250 WO HCPCS SELF ADMINISTERED DRUGS (ALT 637 FOR MEDICARE OP): Performed by: STUDENT IN AN ORGANIZED HEALTH CARE EDUCATION/TRAINING PROGRAM

## 2023-12-26 PROCEDURE — 84157 ASSAY OF PROTEIN OTHER: CPT | Performed by: STUDENT IN AN ORGANIZED HEALTH CARE EDUCATION/TRAINING PROGRAM

## 2023-12-26 PROCEDURE — 89050 BODY FLUID CELL COUNT: CPT | Performed by: STUDENT IN AN ORGANIZED HEALTH CARE EDUCATION/TRAINING PROGRAM

## 2023-12-26 PROCEDURE — 83690 ASSAY OF LIPASE: CPT | Performed by: STUDENT IN AN ORGANIZED HEALTH CARE EDUCATION/TRAINING PROGRAM

## 2023-12-26 PROCEDURE — 82150 ASSAY OF AMYLASE: CPT | Performed by: STUDENT IN AN ORGANIZED HEALTH CARE EDUCATION/TRAINING PROGRAM

## 2023-12-26 PROCEDURE — 36415 COLL VENOUS BLD VENIPUNCTURE: CPT | Performed by: STUDENT IN AN ORGANIZED HEALTH CARE EDUCATION/TRAINING PROGRAM

## 2023-12-26 PROCEDURE — 89051 BODY FLUID CELL COUNT: CPT | Performed by: STUDENT IN AN ORGANIZED HEALTH CARE EDUCATION/TRAINING PROGRAM

## 2023-12-26 PROCEDURE — 2500000004 HC RX 250 GENERAL PHARMACY W/ HCPCS (ALT 636 FOR OP/ED): Mod: JZ | Performed by: STUDENT IN AN ORGANIZED HEALTH CARE EDUCATION/TRAINING PROGRAM

## 2023-12-26 PROCEDURE — 99221 1ST HOSP IP/OBS SF/LOW 40: CPT | Performed by: INTERNAL MEDICINE

## 2023-12-26 RX ORDER — MIDODRINE HYDROCHLORIDE 5 MG/1
5 TABLET ORAL
Status: DISCONTINUED | OUTPATIENT
Start: 2023-12-26 | End: 2023-12-26

## 2023-12-26 RX ORDER — ALBUMIN HUMAN 250 G/1000ML
25 SOLUTION INTRAVENOUS ONCE
Status: COMPLETED | OUTPATIENT
Start: 2023-12-26 | End: 2023-12-26

## 2023-12-26 RX ORDER — MIDODRINE HYDROCHLORIDE 5 MG/1
5 TABLET ORAL EVERY 8 HOURS
Status: DISCONTINUED | OUTPATIENT
Start: 2023-12-26 | End: 2023-12-26 | Stop reason: HOSPADM

## 2023-12-26 RX ADMIN — ALBUMIN HUMAN 25 G: 0.25 SOLUTION INTRAVENOUS at 06:45

## 2023-12-26 RX ADMIN — ARIPIPRAZOLE 5 MG: 5 TABLET ORAL at 08:40

## 2023-12-26 RX ADMIN — SPIRONOLACTONE 50 MG: 25 TABLET, FILM COATED ORAL at 08:40

## 2023-12-26 RX ADMIN — MIDODRINE HYDROCHLORIDE 5 MG: 5 TABLET ORAL at 14:54

## 2023-12-26 RX ADMIN — THIAMINE HCL TAB 100 MG 100 MG: 100 TAB at 08:40

## 2023-12-26 RX ADMIN — FOLIC ACID 1 MG: 1 TABLET ORAL at 08:40

## 2023-12-26 ASSESSMENT — COGNITIVE AND FUNCTIONAL STATUS - GENERAL
DAILY ACTIVITIY SCORE: 24
MOBILITY SCORE: 24

## 2023-12-26 ASSESSMENT — PAIN SCALES - GENERAL: PAINLEVEL_OUTOF10: 0 - NO PAIN

## 2023-12-26 ASSESSMENT — ACTIVITIES OF DAILY LIVING (ADL): LACK_OF_TRANSPORTATION: NO

## 2023-12-26 NOTE — NURSING NOTE
Patient was told she was no longer being discharged today as the vascular team was not able to prescribe anticoagulants as patient refused EGD. Patient got upset and said she was going to leave AMA. Dr. Jesus was notified, patient signed AMA forms and Ivs were removed. Patient left with belongings stating she had a ride coming to the hospital for her.

## 2023-12-26 NOTE — PROGRESS NOTES
"   12/26/23 1518   Discharge Planning   Living Arrangements Alone   Support Systems Children   Assistance Needed Independent   Type of Residence Private residence   Home or Post Acute Services None   Patient expects to be discharged to: Home   Does the patient need discharge transport arranged? Yes   RoundTrip coordination needed? Yes   Has discharge transport been arranged? No   Financial Resource Strain   How hard is it for you to pay for the very basics like food, housing, medical care, and heating? Not hard   Housing Stability   In the last 12 months, was there a time when you were not able to pay the mortgage or rent on time? N   In the last 12 months, was there a time when you did not have a steady place to sleep or slept in a shelter (including now)? N   Transportation Needs   In the past 12 months, has lack of transportation kept you from medical appointments or from getting medications? yes   In the past 12 months, has lack of transportation kept you from meetings, work, or from getting things needed for daily living? No     Transitional Care Coordination Progress Note:  Patient discussed during interdisciplinary rounds.   Team members present: LAURA PIMENTEL  Plan per Medical/Surgical team: Superior mesenteric vein thrombus  [facility]  Discharge disposition: Home  Potential Barriers: none  ADOD: today vs tomorrow  Met with patient at bedside, provided introduction of self and role. Patient states she lives at home alone, independent for adls with no assistive devices. Patient denies recent falls; safe at home. Patient states she is not active with a homecare agency. Pcp at [facility], patient was last seen in Oct 2023. Patient states either daughter provides transport to appointments or she uses provide a ride. Patient states she uses [facility] pharmacy, no concerns obtaining/affording medications. Patient states no social/financial concerns. Patient states she has been \"drinking heavily.\" States " she has been provided resources. Patient requesting information regarding getting HHA services in home. CHW/SW notified to provide resources and to help patient get  through insurance. Patient will need lyft transport home at discharge. Will continue to monitor for discharge planning needs.     Ainsley MCKEON, RN  Transitional Care Coordinator (TCC)  [phone]

## 2023-12-26 NOTE — PROCEDURES
INTERVENTIONAL RADIOLOGY ADVANCED PRACTICE PROCEDURE  [facility]    A time out was performed and Left Hemiabdomen was examined with US and appropriate entry point was confirmed and marked.   The patient was prepped and draped in a sterile manner, 1% lidocaine was used to anesthesize the skin and subcutaneous tissue.   A 5F Centesis needle was then introduced through the skin into the peritoneal space, the centesis catheter was then threaded without difficulty.   4500 ml of yellow fluid was removed without difficulty. The catheter was then removed.   No immediate complications were noted during and immediately following the procedure.

## 2023-12-26 NOTE — PROGRESS NOTES
"[facility]  [facility] [place]  CONSULT FOLLOW-UP     Reason For Consult  Decompensated EtOH associated cirrhosis     SUBJECTIVE     Patient yesterday refused undergoing an EGD. Today underwent a paracentesis w/ 4.5 L fluid removal.    EXAM     Last Recorded Vitals  Blood pressure 90/57, pulse 80, temperature 36.9 °C (98.4 °F), resp. rate 16, height 1.651 m (5' 5\"), weight 63.5 kg (140 lb), SpO2 99 %.    No intake or output data in the 24 hours ending 12/26/23 0826    Physical Exam   General: chronically ill appearing, no acute distress  HEENT: PERRLA, EOM intact, no scleral icterus  Respiratory: CTA bilaterally, normal work of breathing  Cardiovascular: RRR, no murmurs/rubs/gallops  Abdomen: Soft, distended with fluid wave, nontender  Extremities: BL LE 2+ pitting edema above ankles, no asterixis  Neuro: alert and oriented, CNII-XII grossly intact, moves all 4 extremities with no focal deficits      OBJECTIVE                                                                              Medications             Current Facility-Administered Medications:     acetaminophen (Tylenol) tablet 650 mg, 650 mg, oral, q8h PRN, MAGALYS Iniguez, 650 mg at 12/25/23 1131    albumin human 25 % solution 25 g, 25 g, intravenous, Once, Cristian Handley MD, Last Rate: 50 mL/hr at 12/26/23 0645, 25 g at 12/26/23 0645    ARIPiprazole (Abilify) tablet 5 mg, 5 mg, oral, Daily, MAGALYS Iniguez, 5 mg at 12/25/23 0921    atorvastatin (Lipitor) tablet 20 mg, 20 mg, oral, Nightly, MAGALYS Iniguez, 20 mg at 12/25/23 2037    carvedilol (Coreg) tablet 6.25 mg, 6.25 mg, oral, BID, MAGALYS Iniguez, 6.25 mg at 12/25/23 2037    cefTRIAXone (Rocephin) IVPB 1 g, 1 g, intravenous, q24h, MAGALYS Iniguez, Stopped at 12/25/23 2203    folic acid (Folvite) tablet 1 mg, 1 mg, oral, Daily, Sepideh Frias APRN-JANAY, 1 mg at " 12/25/23 0921    furosemide (Lasix) injection 20 mg, 20 mg, intravenous, q12h, Abel Girard MD, 20 mg at 12/25/23 2037    heparin (porcine) injection 2,000-4,000 Units, 2,000-4,000 Units, intravenous, q4h PRN, MAGALYS Iniguez    [Held by provider] heparin 25,000 Units in dextrose 5% 250 mL (100 Units/mL) infusion (premix), 0-4,500 Units/hr, intravenous, Continuous, MAGALYS Iniguez, Stopped at 12/24/23 1700    melatonin tablet 5 mg, 5 mg, oral, Nightly PRN, MAGALYS Iniguez, 5 mg at 12/25/23 2037    polyethylene glycol (Glycolax, Miralax) packet 17 g, 17 g, oral, Daily PRN, MAGALSY Iniguez    sennosides-docusate sodium ([facility]-Colace) 8.6-50 mg per tablet 2 tablet, 2 tablet, oral, BID, MOODY Iniguez-CNP, 2 tablet at 12/24/23 0813    spironolactone (Aldactone) tablet 50 mg, 50 mg, oral, Daily, MOODY Iniguez-CNP, 50 mg at 12/24/23 0813    thiamine (Vitamin B-1) tablet 100 mg, 100 mg, oral, Daily, MOODY Iniguez-CNP, 100 mg at 12/25/23 0921                                                                            Labs     Results for orders placed or performed during the hospital encounter of 12/22/23 (from the past 24 hour(s))   Hepatic function panel   Result Value Ref Range    Albumin 2.5 (L) 3.4 - 5.0 g/dL    Bilirubin, Total 0.8 0.0 - 1.2 mg/dL    Bilirubin, Direct 0.4 (H) 0.0 - 0.3 mg/dL    Alkaline Phosphatase 115 33 - 136 U/L    ALT 33 7 - 45 U/L    AST 53 (H) 9 - 39 U/L    Total Protein 5.7 (L) 6.4 - 8.2 g/dL   Protime-INR   Result Value Ref Range    Protime 15.2 (H) 9.8 - 12.8 seconds    INR 1.3 (H) 0.9 - 1.1     12/23/23 AFP 4                                                                            Imaging           12/22 CT A/P with IV contrast  IMPRESSION:  No acute abdominopelvic process.      Nonocclusive thrombus within the superior mesenteric vein, new from  07/02/2023.      Hepatic cirrhosis  with similar findings of portal hypertension  including large volume ascites, splenomegaly, and recanalized  umbilical vein.      Enlarged ovaries with multiple cysts, grossly stable from 07/02/2023,  however new from 05/26/2022. Further evaluation nonemergent pelvic  ultrasound is recommended, if not already performed.      Stable biliary duct dilatation with intermediate density debris  within the common bile duct. Choledocholithiasis not excluded. Please  correlate clinically for biliary obstruction and consider further  evaluation with ERCP or MRCP if clinically indicated.                                                                             GI Procedures      8/2022 EGD  Findings:       The Z-line was regular and was found 40 cm from the incisors.       Three columns of large (> 5 mm) varices were found in the lower third of        the esophagus. Red diana signs were present. Banding was not attempted        due to patient's intolerance to anesthesia.       Moderate portal hypertensive gastropathy was found in the stomach.       One small semi-sessile polyp with no bleeding and no stigmata of recent        bleeding was found in the gastric fundus.       There is no endoscopic evidence of varices in the stomach.       The examined duodenum was normal.       The exam was otherwise without abnormality.  Impression:    - Z-line regular, 40 cm from the incisors.                         - Large (> 5 mm) esophageal varices.                         - Portal hypertensive gastropathy.                         - Normal examined duodenum.                         - No specimens collected.     8/2022 Colonoscopy  Findings:       The perianal and digital rectal examinations were normal.       A small polyp was found in the cecum. The polyp was sessile. The polyp        was removed with a cold biopsy forceps. Resection and retrieval were        complete. The pathology specimen was placed into Bottle A.       A 10 mm polyp  was found in the descending colon. The polyp was        semi-sessile. The polyp was removed with a cold snare. Resection and        retrieval were complete. The pathology specimen was placed into Bottle B.       A 14 mm polyp was found in the sigmoid colon. The polyp was        semi-pedunculated. Area was successfully injected with 3 mL of a 0.1        mg/mL solution of epinephrine for a lift polypectomy. The polyp was        removed with a hot snare. Resection and retrieval were complete. The        pathology specimen was placed into Bottle C.       A small polyp was found in the rectum. The polyp was hyperplastic.        Polypectomy wasn't attempted.       Internal hemorrhoids were found during retroflexion. The hemorrhoids        were small.     Moderate Sedation:       Moderate (conscious) sedation was administered by the endoscopy nurse        and supervised by the endoscopist. The patient's oxygen saturation,        heart rate, blood pressure and response to care were monitored. Total        physician intraservice time was 57 minutes.     FINAL DIAGNOSIS   A. CECUM, POLYP, POLYPECTOMY:   -- TUBULAR ADENOMA.     B. DESCENDING COLON, POLYP, POLYPECTOMY:   -- TRADITIONAL SERRATED ADENOMA.     C. SIGMOID COLON, POLYP, POLYPECTOMY:   -- INFLAMMATORY-TYPE POLYP WITH SURFACE ULCERATION.   -- NEGATIVE FOR DYSPLASIA.      ASSESSMENT / PLAN                  ASSESSMENT/PLAN:  Ms. Moreno is a 63 y.o. female with a past medical history of decompensated EtOH associated cirrhosis c/b large EV and ascites, ongoing AUD, bipolar disorder, hx of nephrolithiasis, and DLD, admitted on 12/22/2023 with right flank pain.      Hepatology is consulted for decompensated cirrhosis.      #Decompensated EtOH Associated Cirrhosis  #AUD  MELD 3.0: 13 at 12/26/2023  6:43 AM  MELD-Na: 9 at 12/26/2023  6:43 AM  Calculated from:  Serum Creatinine: 0.59 mg/dL (Using min of 1 mg/dL) at 12/24/2023  7:48 AM  Serum Sodium: 135 mmol/L at 12/24/2023   7:48 AM  Total Bilirubin: 0.8 mg/dL (Using min of 1 mg/dL) at 12/26/2023  6:43 AM  Serum Albumin: 2.5 g/dL at 12/26/2023  6:43 AM  INR(ratio): 1.3 at 12/26/2023  6:43 AM  Age at listing (hypothetical): 63 years  Sex: Female at 12/26/2023  6:43 AM  - Ascites: Present; paracentesis q2 weekly, last on the 12/26/23  - Volume: overloaded  - EV: last EGD in 8/2022 showed large EV, not eradicated  - HE: No prior history  - HCC screening: CT A/P in 12/2023 no lesions; 5/2023 AFP 5  - Transplant: still actively drinking, serum ethanol 18 on admission     #SMV thrombus  -incidentally seen on CT A/P. Suspect this is a chronic finding and not related to her reports of flank pain. Additionally, the clot is non-occlusive.  -Vascular surgery consulted and recommended anticoagulation.  -Would recommend AGAINST anticoagulation at this time in the setting of known large esophageal varices in 8/2022 and ongoing AUD. The risks of anticoagulation likely outweigh the benefits in this case.     Recommendations:  -Consider diagnostic paracentesis to rule out SBP.  -Continue Spironolactone 50mg. Please give 25g of albumin followed by Lasix 20mg IVP BID. When the patient is ready for discharge she can be discharged on her home diuretic doses.  -Strict I&Os.  -Please discontinue Coreg 6.25mg BID.  -There was a plan for tentative EGD on the 12/26/23 for variceal ligation, the patient refused to undergo the procedure.  -Consider CIWA given ongoing alcohol abuse.  -When the patient is ready for discharge she will need outpatient follow-up with Dr. Guillaume (hepatology).     The above recommendations were communicated to Dr. Jesus.     Patient was seen and discussed with Dr. Guillaume.     Thank you for the consultation. Hepatology will sign off.     Su Munoz MD  PGY-4 Gastroenterology and Hepatology Fellow  [facility]    Attending and PA/NP shared services statement (NON-critical care):

## 2023-12-26 NOTE — DISCHARGE SUMMARY
Discharge Diagnosis  Superior mesenteric vein thrombosis (CMS/HCC)    Issues Requiring Follow-Up  Fu with hepatology and vascular med    Test Results Pending At Discharge  Pending Labs       Order Current Status    Amylase, Fluid Collected (12/26/23 1234)    Amylase, Fluid Collected (12/26/23 1234)    Body Fluid Cell Count Collected (12/26/23 1234)    Body Fluid Differential Collected (12/26/23 1234)    Body fluid cell count with differential Collected (12/26/23 1234)    Glucose, Fluid Collected (12/26/23 1234)    Glucose, Fluid Collected (12/26/23 1234)    Lactate Dehydrogenase, Fluid Collected (12/26/23 1234)    Lactate Dehydrogenase, Fluid Collected (12/26/23 1234)    Lipase, Fluid Collected (12/26/23 1234)    Lipase, Fluid Collected (12/26/23 1234)    Peritoneal Dialysis Fluid Culture/Smear Collected (12/26/23 1234)    Protein, Total Fluid Collected (12/26/23 1234)    Protein, Total Fluid Collected (12/26/23 1234)    Sterile Fluid Culture/Smear Collected (12/26/23 1251)    Triglycerides, Fluid Collected (12/26/23 1234)    Triglycerides, Fluid Collected (12/26/23 1234)    POCT pregnancy, urine In process            Hospital Course    PT LEFT AMA    63-year-old Hep C, alcoholic cirrhosis c/b ascites, ETOH abuse, bipolar II disorder with psychotic features, MDD, dysplipidemia, and esophageal varices who presented with right flank pain and SI (now resolved, psych cleared her for dc).  Has decompensated alcoholic cirrhosis.  Active alcohol use.  UTI.  On ceftriaxone.  Hepatology consulted.  She has Paracentesis frequent for her ascites, hepatology recommending pursuing another paracentesis 12/26. Pt was found to have mesenteric thrombosis, but left today before setting up a plan with vascular medicine (consulted).  I received a secure chat from the nurse in unit stating that the patient wants to leave ama. I went to the patient room to check and patient was not there. I checked again after 120 minutes to see whether  the patient reappear, but I could not find her in the room. She called her family to pick her up. As she was not going to continue her treatment her in the hospital, I requested fu appointments with hepatology and vascular medicine through out  scheduling service and she can return to continue her treatment at any time.  Pt is decisional and elected to leave AMA, I tried to call the patient without any response, so I ended up lifting a message to voicemail.           Pertinent Physical Exam At Time of Discharge  Physical Exam    Home Medications     Medication List      CHANGE how you take these medications     furosemide 40 mg tablet; Commonly known as: Lasix; What changed: Another   medication with the same name was removed. Continue taking this   medication, and follow the directions you see here.     CONTINUE taking these medications     ARIPiprazole 5 mg tablet; Commonly known as: Abilify   atorvastatin 20 mg tablet; Commonly known as: Lipitor   carvedilol 6.25 mg tablet; Commonly known as: Coreg   folic acid 1 mg tablet; Commonly known as: Folvite   naproxen 500 mg tablet; Commonly known as: Naprosyn   spironolactone 50 mg tablet; Commonly known as: Aldactone   thiamine 100 mg tablet; Commonly known as: Vitamin B-1   TylenoL 325 mg capsule; Generic drug: acetaminophen       Outpatient Follow-Up  Future Appointments   Date Time Provider Department Center   1/3/2024  3:30 PM Faby Wakefield MD [id number] [facility]   1/18/2024  1:30 PM [facility] [id number] NUTRIT FFL DIET [id number] [facility]   1/31/2024  3:00 PM Gene Guillaume MD [id number] Academic       Lily Jesus MD

## 2023-12-26 NOTE — CONSULTS
Inpatient consult to Vascular Medicine  Consult performed by: Jacob Jones MD  Consult ordered by: Lily Jesus MD  Reason for consult: new SMV thrombosis        History Of Present Illness:    Yolie Moreno is a 63-year-old female with history of decompensated alcoholic cirrhosis requiring paracentesis every 2 weeks, alcohol abuse, bipolar 2 disorder with psychotic features, major depressive disorder and remote history of kidney stones presenting with severe right flank pain.  Patient had therapeutic paracentesis on 12/21/2023.  6.3 L removed.  Developed severe right flank pain.  Patient called her PCP telling her of intolerable pain and she felt suicidal due to pain.  She reported planning on overdosing on BP medications.  PCP called 911. CT abdomen pelvis on 12/22/2023 showed nonocclusive thrombus and superior mesenteric vein, hepatic cirrhosis, enlarged ovaries with multiple cysts, stable biliary duct dilation. Emergency department placed patient on heparin gtt. initially. Vascular surgery consulted and recommended no intervention with consult to vascular medicine team for anticoagulation recommendations.  Hepatology was consulted and recommended against anticoagulation due to known large esophageal varices on 8/2022 and ongoing alcohol abuse.  Risk of anticoagulation outweigh benefits in this case.  GI plan for EGD on 12/26/2023 for variceal ligation.     Last Recorded Vitals:  Vitals:    12/26/23 0436 12/26/23 0458 12/26/23 0542 12/26/23 0836   BP: 94/58 93/59 90/57 95/64   Patient Position:       Pulse:    78   Resp:    14   Temp:    36.9 °C (98.4 °F)   TempSrc:       SpO2:    100%   Weight:       Height:           Last Labs:  CBC - 12/24/2023:  7:48 AM  5.8 9.7 93    30.3      CMP - 12/24/2023:  7:48 AM  7.6 5.7 53 --- 0.8   2.7 2.5 33 115      PTT - 12/24/2023:  7:48 AM  1.3   15.2 198     BNP   Date/Time Value Ref Range Status   12/22/2023 07:43 PM 10 0 - 99 pg/mL Final   01/24/2018 03:49 PM 6 0 -  99 pg/mL Final     Comment:     .  <100 pg/mL - Heart failure unlikely  100-299 pg/mL - Intermediate probability of acute heart  .               failure exacerbation. Correlate with clinical  .               context and patient history.    >=300 pg/mL - Heart Failure likely. Correlate with clinical  .               context and patient history.  BNP testing is performed using different testing   methodology at [facility] than at other   [facility]. Direct result comparisons should   only be made within the same method.       Hemoglobin A1C   Date/Time Value Ref Range Status   05/09/2023 03:59 PM 5.2 % Final     Comment:          Diagnosis of Diabetes-Adults   Non-Diabetic: < or = 5.6%   Increased risk for developing diabetes: 5.7-6.4%   Diagnostic of diabetes: > or = 6.5%  .       Monitoring of Diabetes                Age (y)     Therapeutic Goal (%)   Adults:          >18           <7.0   Pediatrics:    13-18           <7.5                   7-12           <8.0                   0- 6            7.5-8.5   American Diabetes Association. Diabetes Care 33(S1), Jan 2010.     05/27/2022 06:22 AM 4.5 % Final     Comment:          Diagnosis of Diabetes-Adults   Non-Diabetic: < or = 5.6%   Increased risk for developing diabetes: 5.7-6.4%   Diagnostic of diabetes: > or = 6.5%  .       Monitoring of Diabetes                Age (y)     Therapeutic Goal (%)   Adults:          >18           <7.0   Pediatrics:    13-18           <7.5                   7-12           <8.0                   0- 6            7.5-8.5   American Diabetes Association. Diabetes Care 33(S1), Jan 2010.       VLDL   Date/Time Value Ref Range Status   06/07/2023 03:47 PM 30 0 - 40 mg/dL Final   05/09/2023 03:59 PM 24 0 - 40 mg/dL Final   05/27/2022 06:22 AM 14 0 - 40 mg/dL Final      CT Abdomen/Pelvis 12/22/23:  No acute abdominopelvic process.      Nonocclusive thrombus within the superior mesenteric vein, new from  07/02/2023.       Hepatic cirrhosis with similar findings of portal hypertension  including large volume ascites, splenomegaly, and recanalized  umbilical vein.      Enlarged ovaries with multiple cysts, grossly stable from 07/02/2023,  however new from 05/26/2022. Further evaluation nonemergent pelvic  ultrasound is recommended, if not already performed.      Stable biliary duct dilatation with intermediate density debris  within the common bile duct. Choledocholithiasis not excluded. Please  correlate clinically for biliary obstruction and consider further  evaluation with ERCP or MRCP if clinically indicated.    Past Medical History:  She has a past medical history of Alcohol abuse, Bipolar II disorder (CMS/HCC), Cirrhosis of liver (CMS/HCC), Cocaine abuse (CMS/HCC), Esophageal varices (CMS/HCC), Hepatitis C, Hydronephrosis, MDD (major depressive disorder), and Trichomonal vulvovaginitis (07/30/2022).    Past Surgical History:  She has a past surgical history that includes Cholecystectomy (11/12/2014); US guided abdominal paracentesis (6/14/2023); US guided abdominal paracentesis (7/3/2023); US guided abdominal paracentesis (9/7/2023); US guided abdominal paracentesis (9/28/2023); US guided abdominal paracentesis (10/19/2023); US guided abdominal paracentesis (11/9/2023); US guided abdominal paracentesis (11/30/2023); US guided abdominal paracentesis (12/21/2023); and US guided abdominal paracentesis (12/26/2023).      Social History:  She reports that she has never smoked. She has never used smokeless tobacco. She reports that she does not currently use alcohol. She reports that she does not currently use drugs.    Family History:  Family History   Problem Relation Name Age of Onset    Diabetes type II Father          Allergies:  Patient has no known allergies.    Inpatient Medications:  Scheduled medications   Medication Dose Route Frequency    ARIPiprazole  5 mg oral Daily    atorvastatin  20 mg oral Nightly    [Held by  provider] carvedilol  6.25 mg oral BID    cefTRIAXone  1 g intravenous q24h    folic acid  1 mg oral Daily    furosemide  20 mg intravenous q12h    sennosides-docusate sodium  2 tablet oral BID    spironolactone  50 mg oral Daily    thiamine  100 mg oral Daily     PRN medications   Medication    acetaminophen    heparin    melatonin    polyethylene glycol     Continuous Medications   Medication Dose Last Rate    [Held by provider] heparin  0-4,500 Units/hr Stopped (12/24/23 1700)     Outpatient Medications:  Current Outpatient Medications   Medication Instructions    acetaminophen (TYLENOL) 650 mg, oral, 3 times daily PRN    ARIPiprazole (Abilify) 5 mg tablet 1 tablet, oral, Daily    atorvastatin (Lipitor) 20 mg tablet 1 tablet, oral, Nightly    carvedilol (Coreg) 6.25 mg tablet Take 1 tablet (6.25 mg) by mouth 2 times a day.    folic acid (FOLVITE) 1 mg, oral, Daily    furosemide (LASIX) 40 mg, oral, Daily    furosemide (LASIX) 40 mg, oral, Daily    naproxen (NAPROSYN) 500 mg, oral, Every 12 hours    spironolactone (Aldactone) 50 mg tablet 1 tablet, oral, Daily    thiamine (VITAMIN B-1) 100 mg, oral, Daily       Physical Exam:  Constitutional: no acute distress  Heart: normal rate, regular rhythm, no murmurs  Lungs: CTA bilaterally  Abdomen: soft, nontender  Extremities: DP 2+, PT 2+, no swelling in any extremity   Neck: supple, nontender  Neurologic: AAO x3     Assessment/Plan   63-year-old female with past medical history of decompensated alcoholic cirrhosis requiring paracentesis every 2 weeks, bipolar 2 disorder, ongoing alcohol abuse, previous large esophageal varices in 2022 presented to the emergency department with right flank pain after paracentesis.  CT abdomen pelvis showed evidence of new SMV thrombosis.  Initially placed on heparin and it was stopped.  Hepatology recommended against anticoagulation due to large esophageal varices in 2022 EGD.  EGD planned for today prior to initiation of  anticoagulation.  Not currently on any prophylactic anticoagulation.  Hemoglobin has decreased from 11-9.7.  Platelets has remained thrombocytopenic at baseline but stable.  No bleeding noted.    #Alcoholic cirrhosis with large esophageal varices in 2022  #New SMV thrombosis, confirmed with radiology   #Bipolar 2 disorder    Recommendations:  -Unable to see the patient prior to giving recommendations due to patient leaving AMA.      Jacob Jones MD  Vascular Medicine    Patient was discussed in rounds, left AMA before being examined or plan finalized.    Eloisa Coyle MD

## 2023-12-26 NOTE — CARE PLAN
Problem: Fall/Injury  Goal: Not fall by end of shift  12/25/2023 2351 by Soumya Serrano RN  Outcome: Progressing  12/25/2023 2351 by Soumya Serrano RN  Outcome: Progressing  Goal: Be free from injury by end of the shift  12/25/2023 2351 by Soumya Serrano RN  Outcome: Progressing  12/25/2023 2351 by Soumya Serrano RN  Outcome: Progressing  Goal: Verbalize understanding of personal risk factors for fall in the hospital  12/25/2023 2351 by Soumya Serrano RN  Outcome: Progressing  12/25/2023 2351 by Soumya Serrano RN  Outcome: Progressing  Goal: Verbalize understanding of risk factor reduction measures to prevent injury from fall in the home  12/25/2023 2351 by Soumya Serrano RN  Outcome: Progressing  12/25/2023 2351 by Soumya Serrano RN  Outcome: Progressing  Goal: Use assistive devices by end of the shift  12/25/2023 2351 by Soumya Serrano RN  Outcome: Progressing  12/25/2023 2351 by Soumya Serrano RN  Outcome: Progressing  Goal: Pace activities to prevent fatigue by end of the shift  12/25/2023 2351 by Soumya Serrano RN  Outcome: Progressing  12/25/2023 2351 by Soumya Serrano RN  Outcome: Progressing   The patient's goals for the shift include      The clinical goals for the shift include pt will remain safe through shift

## 2023-12-26 NOTE — CONSULTS
"Nutrition Initial Assessment:   Nutrition Assessment    Reason for Assessment: Provider consult order (assessment/recs - cirrhosis, low-sodium, high-protein diet)    Patient is a 63 y.o. female presenting with Superior mesenteric vein thrombosis. PMHx of Hep C, alcoholic cirrhosis c/b ascites, ETOH abuse, and esophageal varices. Pt reports that she gets scheduled paracentesis outpatient for ascites. Today 12/26 centesis needle introduced through the skin into the peritoneal space and 4500 ml of yellow fluid was removed.       Nutrition History:  Energy Intake: Fair 50-75 %  Food and Nutrient History: Patient reports she has a fair appetite. Reports early satiety d/t abdominal distension/discomfort. Reports she is eating ~50% of meals. Denies difficulty chewing or swallowing. Denies nausea, vomiting, diarrhea, constipation. Is agreeable to ensure, boost, and magic cups supplements. Prefers chocolate or vanilla flavors.  Food Allergies/Intolerances:  None       Anthropometrics:  Height: 165.1 cm (5' 5\")   Weight: 63.5 kg (140 lb)   BMI (Calculated): 23.3  IBW/kg (Dietitian Calculated): 56.8 kg  Percent of IBW: 112 %       Weight History:   Wt Readings from Last 20 Encounters:   12/22/23 63.5 kg (140 lb)   12/07/23 64.9 kg (143 lb)   09/08/23 64 kg (141 lb)   08/01/23 67.2 kg (148 lb 2.4 oz)   06/07/23 74.4 kg (164 lb)   05/09/23 70.6 kg (155 lb 9.6 oz)   11/14/22 64 kg (141 lb)   08/03/22 58.1 kg (128 lb)   07/19/22 57.2 kg (126 lb 1.6 oz)   06/27/22 59.9 kg (132 lb)   06/06/22 64 kg (141 lb)   05/26/22 76.2 kg (168 lb)   05/26/22 76.2 kg (167 lb 15.9 oz)       Weight Change %:  Weight History / % Weight Change: Patient reports usual body weight of ~150 lb/62.2 kg. Reports she lost the weight unintentionally over 3 months representing 6.7% loss.  Significant Weight Loss: Yes (14.6% in 6 months - likely d/t fluid changes and meeting below estimated energy requirements)  Interpretation of Weight Loss: >10% in 6 " months    Nutrition Focused Physical Exam Findings:    Subcutaneous Fat Loss:   Orbital Fat Pads: Mild-Moderate (slight dark circles and slight hollowing)  Buccal Fat Pads: Mild-Moderate (flat cheeks, minimal bounce)  Triceps: Severe (negligible fat tissue)  Muscle Wasting:  Temporalis: Severe (hollowed scooping depression)  Pectoralis (Clavicular Region): Mild-Moderate (some protrusion of clavicle)  Deltoid/Trapezius: Mild-Moderate (slight protrusion of acromion process)  Quadriceps: Mild-moderate (mild depression on inner and outer thigh)  Gastrocnemius: Mild-Moderate (not well developed muscle)  Edema:  Edema Location: RLE, LLE non-pitting  Physical Findings:  Hair: Negative  Eyes: Negative  Mouth: Negative  Skin: Negative (intact)    Nutrition Significant Labs:  BMP Trend:   Results from last 7 days   Lab Units 12/24/23  0748 12/23/23  1700 12/23/23  0710 12/22/23 1943   GLUCOSE mg/dL 92 113* 122* 109*   CALCIUM mg/dL 7.6* 8.0* 7.9* 8.3*   SODIUM mmol/L 135* 134* 135* 136   POTASSIUM mmol/L 4.2 4.1 3.9 3.4*   CO2 mmol/L 23 24 25 22   CHLORIDE mmol/L 106 106 106 107   BUN mg/dL 8 8 6 6   CREATININE mg/dL 0.59 0.62 0.55 0.61     Results from last 7 days   Lab Units 12/26/23  0643 12/25/23  0547 12/23/23 1700 12/22/23 1943   ALK PHOS U/L 115 101 120 142*   AST U/L 53* 49* 64* 78*   ALT U/L 33 34 42 42   BILIRUBIN TOTAL mg/dL 0.8 1.1 2.3* 1.6*    , Renal Lab Trend:   Results from last 7 days   Lab Units 12/24/23  0748 12/23/23  1700 12/23/23  0710 12/22/23 1943 12/22/23 1943   POTASSIUM mmol/L 4.2 4.1 3.9  --  3.4*   PHOSPHORUS mg/dL 2.7 3.0 2.6   < >  --    SODIUM mmol/L 135* 134* 135*  --  136   MAGNESIUM mg/dL 1.96  --  1.98  --  2.10   EGFR mL/min/1.73m*2 >90 >90 >90  --  >90   BUN mg/dL 8 8 6  --  6   CREATININE mg/dL 0.59 0.62 0.55  --  0.61    < > = values in this interval not displayed.        Nutrition Specific Medications:  Scheduled medications  ARIPiprazole, 5 mg, oral, Daily  atorvastatin, 20 mg,  oral, Nightly  [Held by provider] carvedilol, 6.25 mg, oral, BID  cefTRIAXone, 1 g, intravenous, q24h  folic acid, 1 mg, oral, Daily  furosemide, 20 mg, intravenous, q12h  sennosides-docusate sodium, 2 tablet, oral, BID  spironolactone, 50 mg, oral, Daily  thiamine, 100 mg, oral, Daily        I/O:   Last BM Date: 12/24/23;          Dietary Orders (From admission, onward)       Start     Ordered    12/26/23 1359  Oral nutritional supplements  Until discontinued        Comments: Prefers chocolate or vanilla   Question Answer Comment   Deliver with Dinner    Select supplement: Magic Cup        12/26/23 1358    12/26/23 1358  Oral nutritional supplements  Until discontinued        Comments: Prefers chocolate or vanilla   Question Answer Comment   Deliver with Breakfast    Deliver with Lunch    Select supplement: Ensure Plus        12/26/23 1358    12/23/23 0141  Diet Tray Safety tray  Until discontinued        Question:  Select tray type:  Answer:  Safety tray    12/23/23 0140    12/23/23 0114  Adult diet 2-3 grams sodium; 2000 mL fluid  Diet effective now        Question Answer Comment   Diet type 2-3 grams sodium    Dietary fluid restriction / 24h: 2000 mL fluid        12/23/23 0114                     Estimated Needs:   Total Energy Estimated Needs (kCal): 1700 kCal  Method for Estimating Needs: TRW=5344 (SF 1.4)  Total Protein Estimated Needs (g):  (75+)  Method for Estimating Needs: 1.3+ g/kg IBW (56.8kg)  Total Fluid Estimated Needs (mL):  (1mL/kcal or per team)         Nutrition Diagnosis   Malnutrition Diagnosis  Patient has Malnutrition Diagnosis: Yes  Diagnosis Status: New  Malnutrition Diagnosis: Severe malnutrition related to chronic disease or condition  As Evidenced by: patient meeting </=75% of estimated energy requirements for >/=1 month; 14.6% significant weight loss in 6 months (suspect from fluid changes and decreased PO intake); moderate-severe muscle and subcutaneous fat loss.            Nutrition  Interventions/Recommendations         Nutrition Prescription:  Individualized Nutrition Prescription Provided for : Ensure Plus BID (350kcal, 13g PRO) each, Magic Cup once daily (290kcal, 9g PRO).        Nutrition Interventions:   Food and/or Nutrient Delivery Interventions  Interventions: Meals and snacks, Medical food supplement  Goal: consume >50% of meals, consume ONS, BG 80-180mg/dl, electrolytes WNL.  Additional Interventions: Continue current diet. Encourage PO intake of oral nutrition supplements.       Nutrition Education:   Talked to pt about low salt, high protein diet. Encouraged pt to replace table salt with Mrs. Nadir rodrigues. Recommended pt try herbs/citrus to flavor foods. Encouraged pt to limit high salt meats and provided examples. Pt reports she has difficulty tolerating fried foods. Recommended pt consume baked meats or use olive oil for cooking. Encouraged pt to add more protein to diet and provided examples: milk, cheese, yogurt, pudding, chicken, turkey, fish. Encouraged PO intake or oral nutrition supplements for added calories and protein.       Nutrition Monitoring and Evaluation   Food/Nutrient Related History Monitoring  Monitoring and Evaluation Plan: Energy intake, Fluid intake, Amount of food  Amount of Food: Estimated amout of food, Medical food intake    Body Composition/Growth/Weight History  Monitoring and Evaluation Plan: Weight    Biochemical Data, Medical Tests and Procedures  Monitoring and Evaluation Plan: Electrolyte/renal panel, Glucose/endocrine profile    Nutrition Focused Physical Findings  Monitoring and Evaluation Plan: Digestive System  Digestive System: Decrease in appetite, Ascites, Abdominal distension       Time Spent/Follow-up Reminder:   Time Spent (min): 60 minutes  Last Date of Nutrition Visit: 12/26/23  Nutrition Follow-Up Needed?: Dietitian to reassess per policy

## 2023-12-26 NOTE — CARE PLAN
Problem: Fall/Injury  Goal: Not fall by end of shift  Outcome: Progressing  Goal: Be free from injury by end of the shift  Outcome: Progressing  Goal: Verbalize understanding of personal risk factors for fall in the hospital  Outcome: Progressing  Goal: Verbalize understanding of risk factor reduction measures to prevent injury from fall in the home  Outcome: Progressing     Problem: Pain - Adult  Goal: Verbalizes/displays adequate comfort level or baseline comfort level  Outcome: Progressing     Problem: Safety - Adult  Goal: Free from fall injury  Outcome: Progressing     Problem: Discharge Planning  Goal: Discharge to home or other facility with appropriate resources  Outcome: Progressing     Problem: Chronic Conditions and Co-morbidities  Goal: Patient's chronic conditions and co-morbidity symptoms are monitored and maintained or improved  Outcome: Progressing   The patient's goals for the shift include      The clinical goals for the shift include pt will remain safe through shift

## 2023-12-27 LAB
AMYLASE FLD-CCNC: 20 U/L
LIPASE FLD-CCNC: 54 U/L

## 2023-12-27 NOTE — CARE PLAN
CHW Note 12/27 1015  This CHW was consulted to discuss HHA services with pt.  I contacted the pt via phone call and discussed [employer] program for home care.  Pt was agreeable to having me submit an application on her behalf.  Pt would like dtr to be her care giver, stated she is a certified HHA.  I informed the pt that at the time of assessment by [employer] rep she can inquire about her dtr.  Pt expressed uncertainty on her dietary prescriptions for her most recent admission.  CHW sent message to dietician Maddy Koehler for clarity, Maddy stated that hospital unsure if pt insurance would cover Boost VHC but that pt could inquire at [facility] to  and purchase.  I contacted the pt to inform her of this and informed her of [employer] that may provide free Boost, pt expressed interest in this option, I explained that she would need to  any Boost they may have available.  I emailed Ashley Hutson with [employer] and provided her with the pt contact information and dietary request, Ashley out of office and to return 1/2. I urged the pt to consider discussing any dietary prescriptions with her doctor at [place] [facility] [place].   ANNE Quintana     CHW Note 1/3 2719  Ashley responded to my request and has added the pt to her outreach list to discuss  for ensure/boost.  ANNE Quintana    Written/documented by Rylie Winter, acting as a scribe in Dr. Fisher's presence.    CC/HPI:  The patient is a 32 year old male here today for reevaluation of right suspected cuboid fracture, coalition, bone contusions (navicular, cuboid, calcaneous, and talus), DJD, suspected AVN, pes cavus, and diabetes w/neuropathy. Was previously seen on 8/5/2020. Today, patient states that nothing has changed in his symptoms. He still has occasional sharp pains to the inferior lateral ankle region. He feels he still has a limp as well. The patient does report that his custom orthotics \"do not feel right\", and he has not brought his recent MRI results. He also denies stopping his tobacco use, although he mentions he has lessened his tobacco usage lately. The patient has been wearing the Swede-o brace and he feels this has been more comfortable than his custom orthotics.      Patient is work-comp. Initial injury occurred on 9/23/2019 and believes it occurred during running/tactical training at the [employer]. No foot pain prior to this incident. Patient states is smoking 1/2 ppd. He mentions he is diabetic with neuropathy and is blood sugars are not well controlled.    Nature: Pain, swelling  Duration/Onset: DOI 9/23/2019  Location: Right ankle  Course: Continuous   Aggravating factors: Walking, exercises  Treatments: Icing, Swedo brace, custom orthotics, Diclofenac 75 mg BID, no impact activities    REVIEW OF SYSTEMS  Per HPI, remaining 12 point review of systems negative.    No past medical history on file.  No past surgical history on file.  Social History     Tobacco Use   • Smoking status: Current Every Day Smoker     Packs/day: 1.00   • Smokeless tobacco: Never Used   Substance Use Topics   • Alcohol use: Not Currently   • Drug use: Not on file     No family history on file.  Current Outpatient Medications   Medication Sig Dispense Refill   • diclofenac (VOLTAREN) 75 MG EC tablet Take 1 tablet by mouth 2 times daily.  Take with food, discontinue if GI upset occurs 60 tablet 0   • blood glucose (ONETOUCH VERIO) test strip 1 strip.     • Continuous Blood Gluc Transmit (DEXCOM G6 TRANSMITTER) Misc 1 Device.     • Continuous Blood Gluc Sensor (DEXCOM G6 SENSOR) Misc 1 Device.     • Blood Glucose Monitoring Suppl (ONETOUCH VERIO SYNC SYSTEM) w/Device Kit Use daily     • glucagon (GLUCAGON EMERGENCY) 1 MG injection kit Inject 1 mg into the muscle.     • naproxen (NAPROSYN) 500 MG tablet Take 500 mg by mouth.     • insulin lispro (HUMALOG) 100 UNIT/ML injectable solution Inject 90 Units into the skin.       No current facility-administered medications for this visit.      ALLERGIES:   Allergen Reactions   • Docosahexaenoic Acid (Dha) Nausea & Vomiting     Flu like symptoms     Social History     Tobacco Use   Smoking Status Current Every Day Smoker   • Packs/day: 1.00   Smokeless Tobacco Never Used       PEDAL PHYSICAL EXAM     Constitutional:  Patient alert and oriented x 3   Good body habitus and hygiene     Dermatological  Toe nails: normal  Hair growth: present  Skin color: pink  Skin texture: within normal limits  Lesions: none  Temp gradient: warm to cool    Vascular  Pedal Pulses DP 2/4, bilateral PT 2/4 bilateral CFT = zero to three seconds to toes,bilateral   Edema: Minimal of right proximal midfoot/anterior ankle - improved from previous exam(s)    Neurological  Sensation:intact to digits bilateral  DTR: 2/4 Achilles, left and 2/4 Achilles, right      Musculoskeletal  Deformities: Minimal pain to palpation with reduced swelling over navicular, cuboid, and talus and lateral calcaneus RIGHT foot - improved from previous exam(s)  No pain on palpation and swelling noted of RIGHT peroneus brevis tendon insertion at the 5th metatarsal base - resolved  Upon weight bearing, the medial arch of the foot is noted to be rigid and elevated.   + pain on palpation plantar right arch with small solid sub dermal cyst like mass, non-motile and  non-transilluminating, measuring 1 cm in diam,  Range of motion: ankle ROM restricted dorsiflexion   Muscle mass: normal  Muscle strength: 5/5 bilateral    X-ray ORTHO-READ: Right ankle, 3 views - 2/13/2020  Small ossific density along the dorsal medial aspect of the 1st metatarsal head seen on the oblique view and dorsal talonavicular joint as seen on foot x-ray. No other site suspicious for acute fracture. No dislocation.  Minimal degenerative disease of the 1st MTP joint and talonavicular joint. The rest of the joint spaces are maintained.  IMPRESSION:  1. Small ossific density adjacent to the 1st metatarsal head may represent trauma of indeterminate age.  Correlate with the site of patient's pain.  No other site suspicious for acute fracture or dislocation.  2. Mild deformity along the dorsal distal aspect of the talus c/w fragmented spurring.  Correlate clinically.  3. Nonspecific soft tissue swelling of the ankle.  Electronically signed by: Krzysztof Fisher DPM  2/13/2020    X-ray: Right foot, 3 views - 2/13/2020  EXAM: Three views of the right foot are provided.  Small ossific density along the dorsal medial aspect of the 1st metatarsal head seen on the oblique view may represent trauma of indeterminate age.  No other site suspicious for acute fracture. No dislocation.  Minimal degenerative disease of the 1st MTP joint and talonavicular joint. The rest of the joint spaces are maintained. Over the dorsum of the talo-navicular joint, there is some mild fragmentation of the spurring. Flattening of the talar head as it articulates with the navicular.   IMPRESSION:  1. Small ossific density adjacent to the 1st metatarsal head may represent trauma of indeterminate age. Correlate with the site of patient's pain. No other site suspicious for acute fracture or dislocation.  2. Mild deformity along the dorsal distal aspect of the talus with fragmented spurring. Correlate clinically.  3. Nonspecific soft tissue swelling  of the ankle  Electronically signed by: Krzysztof Fisher DPM  2/13/2020    MRI results: Right ankle w/o contrast, 11/5/2019  IMPRESSION:  1. There is marked flattening and deformity of the head of the talus with associated extensive bone marrow signal abnormality. These findings are suggestive of avascular necrosis with subchondral fracture/collapse. Avascular necrosis secondary to acute/subacute fracture of the head of the talus could also have this appearance. Clinical correlation with the patient's history is recommended.  2. Bone marrow edema within the navicular bone could also represent posttraumatic avascular necrosis or bone contusion.    3. Bone marrow edema within the calcaneus and base of the 1st metatarsal also suggestive of bone contusion.  4. Findings indicating a mild sprain injury of the anterior tibiofibular ligament and the visualized portions of the deltoid ligament.  5. Small tibiotalar and subtalar joint effusions with findings suggestive of a mild synovitis as described above.  6. Findings indicating a mild tenosynovitis of the flexor hallucis longus and posterior tibialis tendons.  7. Findings indicating a mild plantar fasciitis.  8. Other findings as described above.     9. These findings were marked significant acute at 3:29 PM on 11/06/2019.  Electronically Signed by: ABELARDO GARNETT MD    MRI results: Right ankle w/o contrast, 3/17/2020  Impression:   1. Bony coalition between the cuboid and plantar navicular.  2. Advanced osteoarthritic changes at the talonavicular joint, with very prominent dorsal spurs, and marrow edema within the bones.  3. There is mild marrow edema within the plantar cuboid and in the distal calcaneus but the calcaneocuboid joint is otherwise unremarkable.   4. The sinus tarsi appears subjectively narrowed.  5. For the above, consider correlation with CT to better define bony anatomy.   6. The anterior talofibular ligament is slightly attenuated in caliber but could  still be intact.   7. The posterior tibial tendon is also slightly attenuated distally and has minimal surrounding fluid. However, the fibers are intact.   8. The deep deltoid fibers are not well defined, could relate to an old injury, should be correlated clinically.  9. Mild peroneus longus tenosynovitis.     Electronically signed by:  Ayan Abreu MD      CT scan: Right lower extremity w/o contrast, 12/11/2019  IMPRESSION:  1. Prominent talar spur at the talonavicular joint with an associated fracture with several tiny adjacent ossific densities. This fracture extends to involve the subchondral aspect of the talar head at the talonavicular joint with corresponding sclerosis suggesting at least a subacute fracture deformity.   2. No other evidence of fracture or dislocation. Specifically, there is no evidence of a cuboid fracture.   3. Atypical broad-based contiguous anterior and middle facts of the subtalar joint with mild to moderate osteoarthritic changes including joint space narrowing and osteophytes. No evidence of tarsal coalition.   4. No ankle tendon or ligament abnormalities are appreciated given the non contrast CT technique.  ELECTRONICALLY SIGNED BY: Jean Storm     ASSESSMENT   ++Cuboid-navicular coalition Right.  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 In diameter     PLAN Greater than 30 minutes were spent with the patient and in review of medical records of which greater than half the time was spent in counseling and coordination of care (discussion of the disease, management, progression and/or coordinating care, explanation of what  was done, or discussed).  Patient's work comp nurse was not present during today's examination.    The patient was clinically examined. I reviewed the patient's workmen's comp [facility]AR[facility] review notes from Dr LATOYA Low, who is recommending a talonavicular fusion. I do not believe this is the patient's ideal first course of treatment at this time as he is young and active and it would be very restrictive. Although it could be an end stage treatment if pain is persistent. It is important to address the cuboid navicular coalition as this is restricting his mobility and giving rise to early onset DJD and pain, and manifesting in the TN joint. Recommended procedure: right cuboid navicular coalition resection and possible dorsal talonavicular exostectomy. I reviewed his recent MRI report from 3/17/20, but I do not have images. I advised patient to bring these for me to review. For now, I recommend continuing in his swedo brace and custom orthotics. He has a difficult situation with his foot/ankle, and his rigid foot structure is difficult to treat due to the underlying arthritic changes occurring.  Also, with him being a smoker, surgical fusion would be challenging at this time, and he may be better off trying TN joint exostectomy with CN joint coalition resection. I also discussed a Artemio brace with the patient and the benefits of wearing one.  Call with any questions or concerns.    Patient education: We discussed no smoking and 30 minutes of walking daily to reduce the risk of diabetes related lower extremity amputation. We discussed appropriate foot care and shoes and dispensed written patient education material (see AVS) DM and DM foot care. Patient advised to check his feet on a daily basis and if he/she notices any wound or ulcer on his feet/legs that fails to self heal, to please advise their PCP or our service as soon as possible. Any signs of infection are to be reported immediately or go to ER or WIC.    I discussed the  fracture(s) and the need for immobilization to allow the area to heal. I explained to the patient that we need to stabilize the bone(s) during the healing process to allow the swelling to subside and healing to start to occur. Pt was advised to rest, ice, elevate the extremity. Pt was given cast/splint/unnaboot instructions if applicable.     Patient was counseled on the risks of tobacco and its impact on healing. I advised the patient that continued tobacco use can slow down healing as it can reduce the bloodflow to the affected area. I advised the patient that it can delay healing of bone fractures or osteotomies or cause non-unions, it can affect healing of wounds and incisions, it can lead to gangrene or loss of limb. The patient is aware of it and is willing to try to abstain.    I discussed plantar fibroma and how over stress on the affected ligament can lead to inflammation and pain and formation of the masses.  I discussed how chronic swelling can weaken and damage the ligament, which may lead to possible rupture and/or calcium deposition.  I explained to the patient that we need to protect the ligament during the healing process to allow the micro-tearing that is causing the inflammation to heal and subside.  I discussed the treatments of icing, stretching, night splints, physical therapy, orthotic therapy (OTC and custom), cortisone injection therapy and finally surgical intervention/excission as needed.    Patient education: We discussed patient's diagnosis and treatment options in detail.     Procedures: None    RX: Continue icing  RX: Adjustment of custom orthotics to    RX: Continue with the R Swedo brace  RX: Continue with tobacco cessation  RX: Bring most recent MRI imaging  RX: Artemio brace on the right     FOLLOW UP  6-8 weeks        Scribe: Electronically signed: Rylie Winter has scribed for Dr. Fisher, 10/15/20  I have reviewed and edited the progress note and agree with what has been  scribed. Electronically signed by: Krzysztof Fisher DPM,  10/15/20

## 2023-12-29 ENCOUNTER — PATIENT OUTREACH (OUTPATIENT)
Dept: CARE COORDINATION | Facility: CLINIC | Age: 63
End: 2023-12-29
Payer: COMMERCIAL

## 2023-12-29 LAB
BACTERIA FLD CULT: NORMAL
GRAM STN SPEC: NORMAL
GRAM STN SPEC: NORMAL

## 2023-12-29 NOTE — PROGRESS NOTES
Outreach call to patient to support a smooth transition of care from recent admission.  Spoke with patient, reviewed discharge medications, discharge instructions, assessed social needs, and provided education on importance of follow-up appointment with provider.  Patient states she does not have any medications at this time but plans to request refills at her upcoming PCP fuv. She has fuv scheduled with other specialists during the upcoming month and plans to attend those visits. Pt states she is not established with outpatient BH at this time and does not want referral to any provider. She  states her BH issues are related to pain and does not think counseling or psychiatry help. I encouraged her to have an open mind about BH care and that there are providers who specialize in chronic pain issues. Pt says she will consider but is not interested at this time.    I sent a message to pt's PCP informing him of the issues with her medication.    BINH Casiano   III  [facility]  Office Phone: [phone]  [email or web address]

## 2024-01-08 NOTE — DOCUMENTATION CLARIFICATION NOTE
"    PATIENT:               WILL HERMOSILLO  ACCT #:                  [id number]  MRN:                       [id number]  :                       1960  ADMIT DATE:       2023 4:57 PM  DISCH DATE:        2023 5:21 PM  RESPONDING PROVIDER #:        [id number]          PROVIDER RESPONSE TEXT:    Post-procedural abdominal/flank pain    CDI QUERY TEXT:    [facility]_Symptom Etiology    Instruction:    Based on your assessment of the patient and the clinical information, please provide the requested documentation by clicking on the appropriate radio button and enter any additional information if prompted.    Question: Please further clarify the most likely etiology of  abdominal/flank pain on admission after final work up    When answering this query, please exercise your independent professional judgment. The fact that a question is being asked, does not imply that any particular answer is desired or expected.    The patient's clinical indicators include:  Clinical Information: ? 63-year-old Hep C, alcoholic cirrhosis c/b ascites, ETOH abuse, bipolar II disorder with psychotic features, MDD, dysplipidemia, and esophageal varices who presented with right flank pain and SI. ? from  PN    Clinical Indicators:  ED note: ? Patient states that she had a paracentesis done yesterday, that was reportedly uncomplicated however afterwards she started to have right-sided back pain, abdominal pain so severe that she had suicidal ideation ?  ? worsening right flank pain/abdominal pain since recent paracentesis done yesterday. ?  ? 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  Suspect this is a chronic finding and not related to her reports of flank pain \"     PN: ? started on ceftriaxone for UTI, this will also cover SBP follow-up prophylaxis. ?     PN: \" Paracentesis frequent for her " "ascites \"  \" She had 1 recent, appears to be getting better \"  \" she is already on ceftriaxone for UTI. \"    12/26 Procedure note: \" 4500 ml of yellow fluid was removed without difficulty. \"    12/22 UCx showed greater than 100,000 E. coli  12/22 UA: SG 1.036, Urobilinogen 4.0; Nitrite Positive; Leukesterase Moderate; WBC 21-50  12/26 Ascites Fluid culture negative    Treatment: Hepatology consulted. Paracentesis 12/26. Monitor cultures. CT scan. Monitor imaging results. Started Ceftriaxone.    Risk Factors: Presented c/o severe back/flank pain. Frequent paracenteses. Developed flank pain since recent paracentesis. UTI. SMV thrombus.  Options provided:  -- Post-procedural abdominal/flank pain  -- Abdominal/flank pain is complication of recent paracentesis done previous to this admission  -- Abdominal/flank pain due to other, please specify, Please specify  -- Other - I will add my own diagnosis  -- Refer to Clinical Documentation Reviewer    Query created by: Carol Escudero on 1/4/2024 3:13 PM      Electronically signed by:  MARIANNE MERRITT MD 1/8/2024 7:54 AM          "

## 2024-01-10 ENCOUNTER — HOSPITAL ENCOUNTER (OUTPATIENT)
Dept: RADIOLOGY | Facility: HOSPITAL | Age: 64
Discharge: HOME | End: 2024-01-10
Payer: COMMERCIAL

## 2024-01-10 VITALS
DIASTOLIC BLOOD PRESSURE: 80 MMHG | SYSTOLIC BLOOD PRESSURE: 129 MMHG | HEART RATE: 91 BPM | OXYGEN SATURATION: 100 % | RESPIRATION RATE: 18 BRPM | TEMPERATURE: 98.4 F

## 2024-01-10 DIAGNOSIS — K70.30 ALCOHOLIC CIRRHOSIS (MULTI): ICD-10-CM

## 2024-01-10 DIAGNOSIS — K70.31 ALCOHOLIC CIRRHOSIS OF LIVER WITH ASCITES (MULTI): Primary | ICD-10-CM

## 2024-01-10 PROCEDURE — 49083 ABD PARACENTESIS W/IMAGING: CPT | Performed by: NURSE PRACTITIONER

## 2024-01-10 PROCEDURE — 2500000001 HC RX 250 WO HCPCS SELF ADMINISTERED DRUGS (ALT 637 FOR MEDICARE OP): Mod: SE | Performed by: PHYSICIAN ASSISTANT

## 2024-01-10 PROCEDURE — 49083 ABD PARACENTESIS W/IMAGING: CPT

## 2024-01-10 RX ORDER — IBUPROFEN 600 MG/1
600 TABLET ORAL ONCE
Status: COMPLETED | OUTPATIENT
Start: 2024-01-10 | End: 2024-01-10

## 2024-01-10 RX ADMIN — IBUPROFEN 600 MG: 600 TABLET ORAL at 10:45

## 2024-01-10 NOTE — POST-PROCEDURE NOTE
INTERVENTIONAL RADIOLOGY ADVANCED PRACTICE PROCEDURE  [facility]    A time out was performed and Right Hemiabdomen was examined with US and appropriate entry point was confirmed and marked.   The patient was prepped and draped in a sterile manner, 1% lidocaine was used to anesthesize the skin and subcutaneous tissue.   A 5F Centesis needle was then introduced through the skin into the peritoneal space, the centesis catheter was then threaded without difficulty.   4600 ml of yellow fluid was removed without difficulty. The catheter was then removed.   No immediate complications were noted during and immediately following the procedure.

## 2024-01-17 ENCOUNTER — TELEPHONE (OUTPATIENT)
Dept: PRIMARY CARE | Facility: HOSPITAL | Age: 64
End: 2024-01-17
Payer: COMMERCIAL

## 2024-01-18 ENCOUNTER — APPOINTMENT (OUTPATIENT)
Dept: NUTRITION | Facility: HOSPITAL | Age: 64
End: 2024-01-18
Payer: COMMERCIAL

## 2024-01-23 ENCOUNTER — APPOINTMENT (OUTPATIENT)
Dept: NUTRITION | Facility: HOSPITAL | Age: 64
End: 2024-01-23
Payer: COMMERCIAL

## 2024-01-24 ENCOUNTER — HOSPITAL ENCOUNTER (OUTPATIENT)
Dept: RADIOLOGY | Facility: HOSPITAL | Age: 64
Discharge: HOME | End: 2024-01-24
Payer: COMMERCIAL

## 2024-01-24 VITALS
HEART RATE: 88 BPM | TEMPERATURE: 98.2 F | DIASTOLIC BLOOD PRESSURE: 66 MMHG | SYSTOLIC BLOOD PRESSURE: 108 MMHG | OXYGEN SATURATION: 100 %

## 2024-01-24 DIAGNOSIS — K70.31 ALCOHOLIC CIRRHOSIS OF LIVER WITH ASCITES (MULTI): ICD-10-CM

## 2024-01-24 LAB
BASOPHILS NFR FLD MANUAL: 0 %
BLASTS NFR FLD MANUAL: 0 %
CLARITY FLD: ABNORMAL
COLOR FLD: YELLOW
EOSINOPHIL NFR FLD MANUAL: 0 %
IMMATURE GRANULOCYTES IN FLUID: 0 %
LYMPHOCYTES NFR FLD MANUAL: 68 %
MONOS+MACROS NFR FLD MANUAL: 28 %
NEUTROPHILS NFR FLD MANUAL: 1 %
OTHER CELLS NFR FLD MANUAL: 3 %
PLASMA CELLS NFR FLD MANUAL: 0 %
PROT FLD-MCNC: 1.4 G/DL
RBC # FLD AUTO: 97 /UL
TOTAL CELLS COUNTED FLD: 100
WBC # FLD AUTO: 26 /UL

## 2024-01-24 PROCEDURE — 49083 ABD PARACENTESIS W/IMAGING: CPT

## 2024-01-24 PROCEDURE — 49083 ABD PARACENTESIS W/IMAGING: CPT | Performed by: NURSE PRACTITIONER

## 2024-01-24 PROCEDURE — 87070 CULTURE OTHR SPECIMN AEROBIC: CPT | Performed by: NURSE PRACTITIONER

## 2024-01-24 PROCEDURE — 88104 CYTOPATH FL NONGYN SMEARS: CPT | Performed by: NURSE PRACTITIONER

## 2024-01-24 PROCEDURE — 2500000004 HC RX 250 GENERAL PHARMACY W/ HCPCS (ALT 636 FOR OP/ED): Mod: JZ,SE | Performed by: NURSE PRACTITIONER

## 2024-01-24 PROCEDURE — P9047 ALBUMIN (HUMAN), 25%, 50ML: HCPCS | Mod: JZ,SE | Performed by: NURSE PRACTITIONER

## 2024-01-24 PROCEDURE — 84157 ASSAY OF PROTEIN OTHER: CPT | Performed by: NURSE PRACTITIONER

## 2024-01-24 PROCEDURE — 89051 BODY FLUID CELL COUNT: CPT | Performed by: NURSE PRACTITIONER

## 2024-01-24 RX ORDER — ALBUMIN HUMAN 250 G/1000ML
25 SOLUTION INTRAVENOUS ONCE
Status: COMPLETED | OUTPATIENT
Start: 2024-01-24 | End: 2024-01-24

## 2024-01-24 RX ADMIN — ALBUMIN (HUMAN) 25 G: 0.25 INJECTION, SOLUTION INTRAVENOUS at 09:45

## 2024-01-24 NOTE — POST-PROCEDURE NOTE
INTERVENTIONAL RADIOLOGY ADVANCED PRACTICE PROCEDURE  [facility]    A time out was performed and Right Hemiabdomen was examined with US and appropriate entry point was confirmed and marked.   The patient was prepped and draped in a sterile manner, 1% lidocaine was used to anesthesize the skin and subcutaneous tissue.   A 5F Centesis needle was then introduced through the skin into the peritoneal space, the centesis catheter was then threaded without difficulty.   5800 ml of yellow fluid was removed without difficulty. The catheter was then removed.   No immediate complications were noted during and immediately following the procedure.       4 = No assist / stand by assistance

## 2024-01-25 LAB — PATH REVIEW-CELL CT,FLUID: NORMAL

## 2024-01-27 LAB
BACTERIA FLD CULT: NORMAL
GRAM STN SPEC: NORMAL
GRAM STN SPEC: NORMAL

## 2024-01-30 ENCOUNTER — PREP FOR PROCEDURE (OUTPATIENT)
Dept: RADIOLOGY | Facility: HOSPITAL | Age: 64
End: 2024-01-30
Payer: COMMERCIAL

## 2024-01-30 DIAGNOSIS — K70.31 ALCOHOLIC CIRRHOSIS OF LIVER WITH ASCITES (MULTI): Primary | ICD-10-CM

## 2024-01-31 ENCOUNTER — OFFICE VISIT (OUTPATIENT)
Dept: GASTROENTEROLOGY | Facility: HOSPITAL | Age: 64
End: 2024-01-31
Payer: COMMERCIAL

## 2024-01-31 VITALS
HEIGHT: 66 IN | DIASTOLIC BLOOD PRESSURE: 84 MMHG | SYSTOLIC BLOOD PRESSURE: 133 MMHG | TEMPERATURE: 98.2 F | RESPIRATION RATE: 18 BRPM | WEIGHT: 136 LBS | BODY MASS INDEX: 21.86 KG/M2 | HEART RATE: 110 BPM | OXYGEN SATURATION: 99 %

## 2024-01-31 DIAGNOSIS — R18.8 OTHER ASCITES: ICD-10-CM

## 2024-01-31 DIAGNOSIS — I85.00 ESOPHAGEAL VARICES WITHOUT BLEEDING, UNSPECIFIED ESOPHAGEAL VARICES TYPE (MULTI): ICD-10-CM

## 2024-01-31 DIAGNOSIS — K70.31 ALCOHOLIC CIRRHOSIS OF LIVER WITH ASCITES (MULTI): ICD-10-CM

## 2024-01-31 PROCEDURE — 3075F SYST BP GE 130 - 139MM HG: CPT | Performed by: INTERNAL MEDICINE

## 2024-01-31 PROCEDURE — 3079F DIAST BP 80-89 MM HG: CPT | Performed by: INTERNAL MEDICINE

## 2024-01-31 PROCEDURE — 3008F BODY MASS INDEX DOCD: CPT | Performed by: INTERNAL MEDICINE

## 2024-01-31 SDOH — ECONOMIC STABILITY: FOOD INSECURITY: WITHIN THE PAST 12 MONTHS, THE FOOD YOU BOUGHT JUST DIDN'T LAST AND YOU DIDN'T HAVE MONEY TO GET MORE.: NEVER TRUE

## 2024-01-31 SDOH — ECONOMIC STABILITY: FOOD INSECURITY: WITHIN THE PAST 12 MONTHS, YOU WORRIED THAT YOUR FOOD WOULD RUN OUT BEFORE YOU GOT MONEY TO BUY MORE.: NEVER TRUE

## 2024-01-31 ASSESSMENT — PATIENT HEALTH QUESTIONNAIRE - PHQ9
SUM OF ALL RESPONSES TO PHQ9 QUESTIONS 1 AND 2: 0
1. LITTLE INTEREST OR PLEASURE IN DOING THINGS: NOT AT ALL
2. FEELING DOWN, DEPRESSED OR HOPELESS: NOT AT ALL

## 2024-01-31 ASSESSMENT — ENCOUNTER SYMPTOMS
OCCASIONAL FEELINGS OF UNSTEADINESS: 0
LOSS OF SENSATION IN FEET: 0
DEPRESSION: 0

## 2024-01-31 ASSESSMENT — PAIN SCALES - GENERAL: PAINLEVEL: 0-NO PAIN

## 2024-02-07 ENCOUNTER — HOSPITAL ENCOUNTER (OUTPATIENT)
Dept: RADIOLOGY | Facility: HOSPITAL | Age: 64
Discharge: HOME | End: 2024-02-07
Payer: COMMERCIAL

## 2024-02-07 VITALS
TEMPERATURE: 98.6 F | OXYGEN SATURATION: 100 % | SYSTOLIC BLOOD PRESSURE: 121 MMHG | HEART RATE: 98 BPM | RESPIRATION RATE: 17 BRPM | DIASTOLIC BLOOD PRESSURE: 89 MMHG

## 2024-02-07 DIAGNOSIS — K70.31 ALCOHOLIC CIRRHOSIS OF LIVER WITH ASCITES (MULTI): ICD-10-CM

## 2024-02-07 PROCEDURE — 76705 ECHO EXAM OF ABDOMEN: CPT | Performed by: NURSE PRACTITIONER

## 2024-02-07 PROCEDURE — 49083 ABD PARACENTESIS W/IMAGING: CPT

## 2024-02-07 RX ORDER — ALBUMIN HUMAN 250 G/1000ML
50 SOLUTION INTRAVENOUS ONCE
Status: DISCONTINUED | OUTPATIENT
Start: 2024-02-07 | End: 2024-02-08 | Stop reason: HOSPADM

## 2024-02-07 NOTE — POST-PROCEDURE NOTE
INTERVENTIONAL RADIOLOGY ADVANCED PRACTICE PROCEDURE  [facility]    A time out was performed and Left Hemiabdomen was examined with US and appropriate entry point was confirmed and marked.   The patient was prepped and draped in a sterile manner, 1% lidocaine was used to anesthesize the skin and subcutaneous tissue.   A 5F Centesis needle was then introduced through the skin into the peritoneal space, the centesis catheter was then threaded without difficulty.   8950 ml of yellow fluid was removed without difficulty. The catheter was then removed.   No immediate complications were noted during and immediately following the procedure.

## 2024-02-13 ENCOUNTER — HOSPITAL ENCOUNTER (EMERGENCY)
Facility: HOSPITAL | Age: 64
Discharge: HOME | End: 2024-02-13
Attending: EMERGENCY MEDICINE
Payer: COMMERCIAL

## 2024-02-13 VITALS
RESPIRATION RATE: 18 BRPM | HEART RATE: 89 BPM | HEIGHT: 66 IN | TEMPERATURE: 98.8 F | SYSTOLIC BLOOD PRESSURE: 106 MMHG | DIASTOLIC BLOOD PRESSURE: 70 MMHG | BODY MASS INDEX: 26.03 KG/M2 | WEIGHT: 162 LBS | OXYGEN SATURATION: 97 %

## 2024-02-13 DIAGNOSIS — K70.31 ASCITES DUE TO ALCOHOLIC CIRRHOSIS (MULTI): Primary | ICD-10-CM

## 2024-02-13 LAB
ALBUMIN SERPL BCP-MCNC: 3.1 G/DL (ref 3.4–5)
ALP SERPL-CCNC: 147 U/L (ref 33–136)
ALT SERPL W P-5'-P-CCNC: 43 U/L (ref 7–45)
ANION GAP SERPL CALC-SCNC: 11 MMOL/L (ref 10–20)
APPEARANCE UR: ABNORMAL
APTT PPP: 32 SECONDS (ref 27–38)
AST SERPL W P-5'-P-CCNC: 90 U/L (ref 9–39)
BASOPHILS # BLD AUTO: 0.09 X10*3/UL (ref 0–0.1)
BASOPHILS NFR BLD AUTO: 2.2 %
BILIRUB SERPL-MCNC: 1.4 MG/DL (ref 0–1.2)
BILIRUB UR STRIP.AUTO-MCNC: NEGATIVE MG/DL
BUN SERPL-MCNC: 6 MG/DL (ref 6–23)
CALCIUM SERPL-MCNC: 8.4 MG/DL (ref 8.6–10.6)
CHLORIDE SERPL-SCNC: 108 MMOL/L (ref 98–107)
CO2 SERPL-SCNC: 25 MMOL/L (ref 21–32)
COLOR UR: YELLOW
CREAT SERPL-MCNC: 0.55 MG/DL (ref 0.5–1.05)
EGFRCR SERPLBLD CKD-EPI 2021: >90 ML/MIN/1.73M*2
EOSINOPHIL # BLD AUTO: 0.12 X10*3/UL (ref 0–0.7)
EOSINOPHIL NFR BLD AUTO: 2.9 %
ERYTHROCYTE [DISTWIDTH] IN BLOOD BY AUTOMATED COUNT: 16.4 % (ref 11.5–14.5)
GLUCOSE SERPL-MCNC: 96 MG/DL (ref 74–99)
GLUCOSE UR STRIP.AUTO-MCNC: NORMAL MG/DL
HCT VFR BLD AUTO: 32.1 % (ref 36–46)
HGB BLD-MCNC: 10.9 G/DL (ref 12–16)
IMM GRANULOCYTES # BLD AUTO: 0.01 X10*3/UL (ref 0–0.7)
IMM GRANULOCYTES NFR BLD AUTO: 0.2 % (ref 0–0.9)
INR PPP: 1.1 (ref 0.9–1.1)
KETONES UR STRIP.AUTO-MCNC: NEGATIVE MG/DL
LEUKOCYTE ESTERASE UR QL STRIP.AUTO: ABNORMAL
LIPASE SERPL-CCNC: 162 U/L (ref 9–82)
LYMPHOCYTES # BLD AUTO: 1.59 X10*3/UL (ref 1.2–4.8)
LYMPHOCYTES NFR BLD AUTO: 38.3 %
MAGNESIUM SERPL-MCNC: 2.03 MG/DL (ref 1.6–2.4)
MCH RBC QN AUTO: 28.4 PG (ref 26–34)
MCHC RBC AUTO-ENTMCNC: 34 G/DL (ref 32–36)
MCV RBC AUTO: 84 FL (ref 80–100)
MONOCYTES # BLD AUTO: 1.08 X10*3/UL (ref 0.1–1)
MONOCYTES NFR BLD AUTO: 26 %
MUCOUS THREADS #/AREA URNS AUTO: ABNORMAL /LPF
NEUTROPHILS # BLD AUTO: 1.26 X10*3/UL (ref 1.2–7.7)
NEUTROPHILS NFR BLD AUTO: 30.4 %
NITRITE UR QL STRIP.AUTO: NEGATIVE
NRBC BLD-RTO: 0 /100 WBCS (ref 0–0)
PH UR STRIP.AUTO: 6 [PH]
PLATELET # BLD AUTO: 81 X10*3/UL (ref 150–450)
POTASSIUM SERPL-SCNC: 3.8 MMOL/L (ref 3.5–5.3)
PROT SERPL-MCNC: 7.4 G/DL (ref 6.4–8.2)
PROT UR STRIP.AUTO-MCNC: NEGATIVE MG/DL
PROTHROMBIN TIME: 12.9 SECONDS (ref 9.8–12.8)
RBC # BLD AUTO: 3.84 X10*6/UL (ref 4–5.2)
RBC # UR STRIP.AUTO: NEGATIVE /UL
RBC #/AREA URNS AUTO: ABNORMAL /HPF
SODIUM SERPL-SCNC: 140 MMOL/L (ref 136–145)
SP GR UR STRIP.AUTO: 1.01
UROBILINOGEN UR STRIP.AUTO-MCNC: ABNORMAL MG/DL
WBC # BLD AUTO: 4.2 X10*3/UL (ref 4.4–11.3)
WBC #/AREA URNS AUTO: ABNORMAL /HPF

## 2024-02-13 PROCEDURE — 83735 ASSAY OF MAGNESIUM: CPT | Performed by: EMERGENCY MEDICINE

## 2024-02-13 PROCEDURE — 99284 EMERGENCY DEPT VISIT MOD MDM: CPT | Mod: 25 | Performed by: EMERGENCY MEDICINE

## 2024-02-13 PROCEDURE — 85025 COMPLETE CBC W/AUTO DIFF WBC: CPT | Performed by: EMERGENCY MEDICINE

## 2024-02-13 PROCEDURE — 99285 EMERGENCY DEPT VISIT HI MDM: CPT | Mod: 25

## 2024-02-13 PROCEDURE — 99284 EMERGENCY DEPT VISIT MOD MDM: CPT | Performed by: EMERGENCY MEDICINE

## 2024-02-13 PROCEDURE — 81001 URINALYSIS AUTO W/SCOPE: CPT | Performed by: EMERGENCY MEDICINE

## 2024-02-13 PROCEDURE — 85610 PROTHROMBIN TIME: CPT | Performed by: EMERGENCY MEDICINE

## 2024-02-13 PROCEDURE — 80053 COMPREHEN METABOLIC PANEL: CPT | Performed by: EMERGENCY MEDICINE

## 2024-02-13 PROCEDURE — 49083 ABD PARACENTESIS W/IMAGING: CPT

## 2024-02-13 PROCEDURE — 85730 THROMBOPLASTIN TIME PARTIAL: CPT | Performed by: EMERGENCY MEDICINE

## 2024-02-13 PROCEDURE — 87186 SC STD MICRODIL/AGAR DIL: CPT | Performed by: EMERGENCY MEDICINE

## 2024-02-13 PROCEDURE — 83690 ASSAY OF LIPASE: CPT | Performed by: EMERGENCY MEDICINE

## 2024-02-13 PROCEDURE — 36415 COLL VENOUS BLD VENIPUNCTURE: CPT | Performed by: EMERGENCY MEDICINE

## 2024-02-13 ASSESSMENT — COLUMBIA-SUICIDE SEVERITY RATING SCALE - C-SSRS
2. HAVE YOU ACTUALLY HAD ANY THOUGHTS OF KILLING YOURSELF?: NO
6. HAVE YOU EVER DONE ANYTHING, STARTED TO DO ANYTHING, OR PREPARED TO DO ANYTHING TO END YOUR LIFE?: NO
1. IN THE PAST MONTH, HAVE YOU WISHED YOU WERE DEAD OR WISHED YOU COULD GO TO SLEEP AND NOT WAKE UP?: NO

## 2024-02-13 ASSESSMENT — PAIN SCALES - GENERAL: PAINLEVEL_OUTOF10: 0 - NO PAIN

## 2024-02-13 ASSESSMENT — PAIN - FUNCTIONAL ASSESSMENT: PAIN_FUNCTIONAL_ASSESSMENT: 0-10

## 2024-02-13 NOTE — ED PROVIDER NOTES
HPI   Chief Complaint   Patient presents with    Abdominal Pain     distention       Yolie Moreno is 63-year-old F w/ HCV, alcoholic cirrhosis c/b ascites, ETOH abuse, bipolar II disorder with psychotic features, MDD, dysplipidemia, and esophageal varices who presented to the ED with abdominal distension requesting to have the fluid drained from abdomen like it was done on 2/7/24.  Patient denies any additional symptoms and reports that she is angry that she has had to wait so long to be seen.      ROS negative unless noted in HPI           History provided by:  Patient   used: No                        Boyd Coma Scale Score: 15                     Patient History   Past Medical History:   Diagnosis Date    Cirrhosis (CMS/HCC)      No past surgical history on file.  No family history on file.  Social History     Tobacco Use    Smoking status: Never    Smokeless tobacco: Never   Substance Use Topics    Alcohol use: Not on file    Drug use: Not on file       Physical Exam   ED Triage Vitals [02/13/24 0952]   Temperature Heart Rate Respirations BP   37.1 °C (98.8 °F) 95 17 119/79      Pulse Ox Temp src Heart Rate Source Patient Position   98 % -- Monitor --      BP Location FiO2 (%)     -- --       Physical Exam  Vitals reviewed.   Constitutional:       General: She is not in acute distress.     Appearance: She is not ill-appearing, toxic-appearing or diaphoretic.   HENT:      Head: Normocephalic and atraumatic.   Eyes:      General: Scleral icterus present.   Cardiovascular:      Rate and Rhythm: Normal rate and regular rhythm.      Heart sounds: Normal heart sounds. No murmur heard.     No friction rub. No gallop.   Pulmonary:      Effort: Pulmonary effort is normal. No respiratory distress.      Breath sounds: Normal breath sounds. No stridor. No wheezing, rhonchi or rales.   Chest:      Chest wall: No tenderness.   Abdominal:      General: Abdomen is protuberant. There is distension.       Palpations: Abdomen is rigid. There is fluid wave.      Tenderness: There is no abdominal tenderness.      Hernia: No hernia is present.   Neurological:      General: No focal deficit present.      Mental Status: She is alert and oriented to person, place, and time.   Psychiatric:         Mood and Affect: Affect is labile and angry.         Speech: Speech normal.       ED Course & MDM        Medical Decision Making  Yolie Moreno is 62 y/o F w/ HCV, alcoholic cirrhosis c/b ascites, ETOH abuse with continued use, Bipolar II disorder with psychotic features, MDD, DLD, and esophageal varices who presented today requesting paracentesis due to abdominal ascites.  On exam patient is in NAD and HDS.  Patient noted to have labile affect and angry regard the long weight time to be seen.  Physical exam is significant for positive fluid wave with a distended abdomen.  On review of records for the patient found that she had paracentesis recently performed in the outpatient setting on 2/7/2024 by interventional Radiology with a total of 8950 mL of fluid removed.  Discussed with patient that she has not acute need that would require her to be admitted to the hospital, and that could either follow up with outpatient providers or could offer to drain some of the fluid in the ED to provide partial symptomatic relief and then have her follow up outpatient for removal of the remaining fluid.  Patient elected to have partial fluid drain and then follow up with outpatient providers.       Procedure was attempted, but patient requested termination of the procedure.  Discussed with patient that would recommend outpatient follow up patient endorsed that she would follow up with outpatient providers.  Patient was discharged in stable condition to follow up with outpatient providers.            Procedure  Paracentesis    Performed by: Too Novoa MD  Authorized by: Christa Ocampo MD    Consent:     Consent obtained:  Written     Consent given by:  Patient    Risks, benefits, and alternatives were discussed: yes      Risks discussed:  Bleeding, bowel perforation, infection and pain    Alternatives discussed:  No treatment, delayed treatment and alternative treatment  Universal protocol:     Procedure explained and questions answered to patient or proxy's satisfaction: yes      Relevant documents present and verified: yes      Test results available: yes      Imaging studies available: yes      Site/side marked: yes      Immediately prior to procedure, a time out was called: yes      Patient identity confirmed:  Verbally with patient  Pre-procedure details:     Procedure purpose:  Diagnostic    Preparation: Patient was prepped and draped in usual sterile fashion    Anesthesia:     Anesthesia method:  Local infiltration    Local anesthetic:  Lidocaine 1% w/o epi  Procedure details:     Needle gauge:  20    Ultrasound guidance: yes      Puncture site:  L lower quadrant    Fluid removed amount:  5 cc    Fluid appearance:  Yellow    Dressing:  Adhesive bandage  Post-procedure details:     Procedure completion:  Procedure terminated at patient's request  Comments:      Procedure attempted with removal of approximately 5 mL of fluid       Too Novoa MD  Resident  02/13/24 9538

## 2024-02-14 ENCOUNTER — HOSPITAL ENCOUNTER (EMERGENCY)
Facility: HOSPITAL | Age: 64
Discharge: HOME | End: 2024-02-14
Attending: EMERGENCY MEDICINE
Payer: COMMERCIAL

## 2024-02-14 VITALS
RESPIRATION RATE: 16 BRPM | SYSTOLIC BLOOD PRESSURE: 117 MMHG | OXYGEN SATURATION: 98 % | TEMPERATURE: 97.7 F | HEART RATE: 80 BPM | DIASTOLIC BLOOD PRESSURE: 77 MMHG

## 2024-02-14 DIAGNOSIS — Z51.89 VISIT FOR WOUND CHECK: Primary | ICD-10-CM

## 2024-02-14 LAB — HOLD SPECIMEN: NORMAL

## 2024-02-14 PROCEDURE — 99283 EMERGENCY DEPT VISIT LOW MDM: CPT | Performed by: EMERGENCY MEDICINE

## 2024-02-14 PROCEDURE — 99283 EMERGENCY DEPT VISIT LOW MDM: CPT | Performed by: NURSE PRACTITIONER

## 2024-02-14 ASSESSMENT — COLUMBIA-SUICIDE SEVERITY RATING SCALE - C-SSRS
2. HAVE YOU ACTUALLY HAD ANY THOUGHTS OF KILLING YOURSELF?: NO
1. IN THE PAST MONTH, HAVE YOU WISHED YOU WERE DEAD OR WISHED YOU COULD GO TO SLEEP AND NOT WAKE UP?: NO
6. HAVE YOU EVER DONE ANYTHING, STARTED TO DO ANYTHING, OR PREPARED TO DO ANYTHING TO END YOUR LIFE?: NO

## 2024-02-14 NOTE — ED TRIAGE NOTES
"Pt was here a couple hours ago for abdominal pain/ascites. Pt has liver cirrhosis due to alcoholism. Pt states they attempted to drain the fluid from her abdomen this morning and it was unsuccessful. Pt is here because the needle insertion site is leaking. Pt endorsed drinking \"a bunch of beers\" PTA. When asked to elaborate pt would only say that is was more than 10 and they were the 8oz cans.  "

## 2024-02-14 NOTE — ED PROVIDER NOTES
"Chief Complaint   Patient presents with   • Wound Check       HPI       63 year old female presents to the Emergency Department today complaining of leaking fluid from the LLQ of her abdomen from an attempt at a paracentesis yesterday in the ED. Notes to having a longstanding history of cirrhosis of the liver with her previous paracentesis being on 2/7/24. Denies any associated fever, chills, headache, neck pain, chest pain, shortness of breath, abdominal pain, nausea, vomiting, diarrhea, constipation, or urinary symptoms. Notes that she has had \"several beers\" today.       History provided by:  Patient             Patient History   Past Medical History:   Diagnosis Date   • Alcohol abuse    • Bipolar II disorder (CMS/HCC)    • Cirrhosis of liver (CMS/HCC)    • Cocaine abuse (CMS/HCC)    • Esophageal varices (CMS/HCC)    • Hepatitis C    • Hydronephrosis    • MDD (major depressive disorder)    • Trichomonal vulvovaginitis 07/30/2022    Trichomonas vaginitis     Past Surgical History:   Procedure Laterality Date   • CHOLECYSTECTOMY  11/12/2014    Cholecystectomy   • US GUIDED ABDOMINAL PARACENTESIS  6/14/2023    [place] GUIDED ABDOMINAL PAR[place]ENTES[place] [facility] [place]   • [place] GUIDED ABDOMINAL PARACENTESIS  7/3/2023    US GUIDED ABDOMINAL PARACENTESIS 7/3/2023 [facility] [place]   • [place] GUIDED ABDOMINAL PARACENTESIS  9/7/2023    US GUIDED ABDOMINAL PARACENTESIS 9[place]7/2023 [facility] [place]   • [place] GUIDED ABDOMINAL PARACENTESIS  9/28/2023    US GUIDED ABDOMINAL PARACENTESIS 9/28/2023 [facility] [place] [place]   • US GUIDED ABDOMINAL PARACENTESIS  10/19/2023    US GUIDED ABDOMINAL PARACENTESIS 10/19/2023 [facility] [place] [place]   • US GUIDED ABDOMINAL PARACENTESIS  11/9/2023    US GUIDED ABDOMINAL PARACENTESIS 11/9/2023 Carlos Chan, APRN-CNP [facility] [place]   • US GUIDED ABDOMINAL PARACENTESIS  11/30/2023    US GUIDED ABDOMINAL PARACENTESIS 11/30/2023 [facility] [place]   • US GUIDED ABDOMINAL PARACENTESIS  12/21/2023    US GUIDED ABDOMINAL PARACENTESIS 12/21/2023 [facility] [place] [place]   • US GUIDED ABDOMINAL " PARACENTESIS  12/26/2023    US GUIDED ABDOMINAL PARACENTESIS 12/26/2023 Carlos Chan, APRN-CNP [facility] [place]     Family History   Problem Relation Name Age of Onset   • Diabetes type II Father       Social History     Tobacco Use   • Smoking status: Never   • Smokeless tobacco: Never   Vaping Use   • Vaping Use: Never used   Substance Use Topics   • Alcohol use: Yes   • Drug use: Not Currently           Physical Exam  Constitutional:       General: She is awake.      Appearance: Normal appearance.   Cardiovascular:      Rate and Rhythm: Normal rate and regular rhythm.      Pulses:           Radial pulses are 3+ on the right side and 3+ on the left side.      Heart sounds: Normal heart sounds. No murmur heard.     No friction rub. No gallop.   Pulmonary:      Effort: Pulmonary effort is normal.      Breath sounds: Normal breath sounds and air entry.   Abdominal:      General: There is distension.      Palpations: Abdomen is soft.      Tenderness: There is no abdominal tenderness. There is no right CVA tenderness, left CVA tenderness or guarding.      Comments: Serous fluid draining from the LLQ of her abdomen.    Neurological:      Mental Status: She is alert.   Psychiatric:         Behavior: Behavior is cooperative.         Labs Reviewed - No data to display    No orders to display            ED Course & MDM   Diagnoses as of 02/14/24 0802   Visit for wound check           Medical Decision Making  Patient was seen and evaluated by Dr. Calle. There are no signs of infection or active bleeding. Serous fluid is slowly leaking from her LLQ. Dressing was applied and supplies given. No further intervention is clinically indicated at this time. Follow up with their doctor in 3 days. Return if worse in any way. Discharged in stable condition with computer instructions.    Diagnostic Impression:     1. Wound check           Your medication list        ASK your doctor about these medications        Instructions Last Dose  Given Next Dose Due   ARIPiprazole 5 mg tablet  Commonly known as: Abilify           atorvastatin 20 mg tablet  Commonly known as: Lipitor           carvedilol 6.25 mg tablet  Commonly known as: Coreg           folic acid 1 mg tablet  Commonly known as: Folvite           furosemide 40 mg tablet  Commonly known as: Lasix           naproxen 500 mg tablet  Commonly known as: Naprosyn           spironolactone 50 mg tablet  Commonly known as: Aldactone           thiamine 100 mg tablet  Commonly known as: Vitamin B-1           TylenoL 325 mg capsule  Generic drug: acetaminophen                      Procedure  Procedures     Theo Mejía, MOODY-JANAY  02/14/24 0802

## 2024-02-15 ENCOUNTER — TELEPHONE (OUTPATIENT)
Dept: PRIMARY CARE | Facility: HOSPITAL | Age: 64
End: 2024-02-15
Payer: COMMERCIAL

## 2024-02-15 ENCOUNTER — HOSPITAL ENCOUNTER (EMERGENCY)
Facility: HOSPITAL | Age: 64
Discharge: HOME | End: 2024-02-15
Attending: EMERGENCY MEDICINE
Payer: COMMERCIAL

## 2024-02-15 VITALS
TEMPERATURE: 97 F | SYSTOLIC BLOOD PRESSURE: 96 MMHG | HEIGHT: 66 IN | HEART RATE: 82 BPM | RESPIRATION RATE: 18 BRPM | DIASTOLIC BLOOD PRESSURE: 63 MMHG | OXYGEN SATURATION: 100 % | WEIGHT: 165 LBS | BODY MASS INDEX: 26.52 KG/M2

## 2024-02-15 DIAGNOSIS — Z51.89 VISIT FOR WOUND CHECK: Primary | ICD-10-CM

## 2024-02-15 PROCEDURE — 99283 EMERGENCY DEPT VISIT LOW MDM: CPT | Performed by: EMERGENCY MEDICINE

## 2024-02-15 NOTE — ED TRIAGE NOTES
Patient has a history of liver cirrhosis. She gets paracentesis every 2 weeks. Last done on 2/7. She has had persistent leakage from the site. Was here yesterday, they were going to try to drain her again but was unsuccessful, discharged home with follow up. She presents today with persistent leakage.

## 2024-02-15 NOTE — DISCHARGE INSTRUCTIONS
You have been evaluated in the Emergency Department today for paracentesis site leakage.  Please take the pressure dressing that was applied in place.     Please follow up with your primary care physician within two days. If you do not have a primary care doctor you may call [phone] to make an appointment.    Return to the Emergency Department if you experience worsening abdominal pain, fevers, chills. Return to the Emergency Department if you develop any new or worsening symptoms.   Thank you for choosing us for your care.

## 2024-02-15 NOTE — ED PROVIDER NOTES
CC: Wound Check     HPI: Yolie Moreno is an 63 y.o. female with history including alcohol related cirrhosis presenting to the emergency department for paracentesis leakage.  Patient reports having a paracentesis out of the ED 2 days prior.  Cannot find documentation of this.  She was here yesterday because the paracentesis site was continuing to leak she is coming today but is continuing to leak.  Denying abdominal pain, shortness of breath, fevers or chills.  There is a small needle incision from the prior paracentesis over the left upper abdomen.    Triage note was reviewed and  agree with it  Limitations to History:  none  Additional History Obtained from:  chart review    PMHx/PSHx:  Per HPI.   - has a past medical history of Alcohol abuse, Bipolar II disorder (CMS/HCC), Cirrhosis of liver (CMS/HCC), Cocaine abuse (CMS/HCC), Esophageal varices (CMS/HCC), Hepatitis C, Hydronephrosis, MDD (major depressive disorder), and Trichomonal vulvovaginitis (07/30/2022).  - has a past surgical history that includes Cholecystectomy (11/12/2014); US guided abdominal paracentesis (6/14/2023); US guided abdominal paracentesis (7/3/2023); US guided abdominal paracentesis (9/7/2023); US guided abdominal paracentesis (9/28/2023); US guided abdominal paracentesis (10/19/2023); US guided abdominal paracentesis (11/9/2023); US guided abdominal paracentesis (11/30/2023); US guided abdominal paracentesis (12/21/2023); and US guided abdominal paracentesis (12/26/2023).    Social History:  - Tobacco:  reports that she has never smoked. She has never used smokeless tobacco.   - Alcohol:  reports current alcohol use.   - Drugs:  reports that she does not currently use drugs.     Medications: Reviewed in EMR.     Allergies:  Patient has no known allergies.    ???????????????????????????????????????????????????????????????  Triage Vitals:  T 36.1 °C (97 °F)  HR 82  BP 96/63  RR 18  O2 100 % None (Room air)    Physical Exam  Vitals  and nursing note reviewed.   Constitutional:       General: She is not in acute distress.  HENT:      Head: Normocephalic and atraumatic.   Eyes:      Conjunctiva/sclera: Conjunctivae normal.   Cardiovascular:      Rate and Rhythm: Normal rate and regular rhythm.   Pulmonary:      Effort: Pulmonary effort is normal. No respiratory distress.      Breath sounds: Normal breath sounds.   Abdominal:      General: There is no distension.      Palpations: Abdomen is soft.      Tenderness: There is no abdominal tenderness.      Comments: Small needle incision site.  Active clear leakage   Musculoskeletal:         General: No deformity.      Cervical back: Normal range of motion.   Skin:     General: Skin is warm and dry.   Neurological:      Mental Status: She is alert and oriented to person, place, and time.   Psychiatric:         Mood and Affect: Mood normal.         Behavior: Behavior normal.       ???????????????????????????????????????????????????????????????      ED Course/Medical Decision Making:    Diagnoses as of 02/15/24 2344   Visit for wound check        Patient is a 63-year-old female presenting with a wound check.  Patient has a prior paracentesis site that has a active leakage from it.  There is no dressing applied.  No concern for SBP at this time.  Patient is currently afebrile with no signs of abdominal tenderness.  Wound site was of compressed.  And leakage was slowed.  Dermabond was applied.  Leakage was stopped as well as an occlusive dressing was overlie.  Patient was hemodynamically stable. Patient was discharged home in stable condition. Patient was advised to return if symptoms worsen or don’t improve. Patient was advised to follow up with their PCP as needed.       External records reviewed: recent inpatient, clinic, and prior ED notes  Diagnostic imaging independently reviewed/interpreted by me (as reflected in MDM) includes: none  Social Determinants Affecting Care:   Discussion of management  with other providers: ED attending,    Prescription Drug Consideration: none  Escalation of Care: none    Pt was seen and discussed with ED attending     Impression:   Paracentesis site leakage    Disposition: discharge        Procedures ? SmartLinks last updated 2/15/2024 6:30 PM        Marcelina Mercedes MD  Resident  02/15/24 5916       Marcelina Mercedes MD  Resident  02/15/24 4169

## 2024-02-15 NOTE — Clinical Note
Yolie Moreno was seen and treated in our emergency department on 2/15/2024.  She may return to work on 02/16/2024.       If you have any questions or concerns, please don't hesitate to call.      Marcelina Mercedes MD

## 2024-02-15 NOTE — TELEPHONE ENCOUNTER
Patient called very upset. She states she is leaking from her stomach still.Please call her back.

## 2024-02-16 LAB — BACTERIA UR CULT: ABNORMAL

## 2024-02-17 ENCOUNTER — TELEPHONE (OUTPATIENT)
Dept: PHARMACY | Facility: HOSPITAL | Age: 64
End: 2024-02-17

## 2024-02-17 NOTE — PROGRESS NOTES
EDPD Note: Rapid Result Review    Reviewed Ms. Yolie Moreno 's chart regarding a positive urine culture/result that was taken during their recent emergency room visit. The patient was not told about these results prior to leaving the emergency department. Therefore, patient was contacted and given proper education.     She is (+) for a UTI and endorses NO symptoms of UTI (frequency, urgency, and burning) during phone call. No treatment is needed at this time, advised her to follow-up with PCP if symptoms do arise.     Susceptibility data from last 90 days.  Collected Specimen Info Organism Amoxicillin/Clavulanate Ampicillin Ampicillin/Sulbactam Cefazolin Cefazolin (uncomplicated UTIs only) Ciprofloxacin Gentamicin Nitrofurantoin Piperacillin/Tazobactam Trimethoprim/Sulfamethoxazole   02/13/24 Urine from Clean Catch/Voided Klebsiella pneumoniae/variicola S R S S S S S I S S       No further follow up needed from EDPD Team.     Nara Foster, PharmD

## 2024-02-19 ENCOUNTER — PREP FOR PROCEDURE (OUTPATIENT)
Dept: RADIOLOGY | Facility: HOSPITAL | Age: 64
End: 2024-02-19
Payer: COMMERCIAL

## 2024-02-19 DIAGNOSIS — K70.31 ALCOHOLIC CIRRHOSIS OF LIVER WITH ASCITES (MULTI): Primary | ICD-10-CM

## 2024-02-28 ENCOUNTER — HOSPITAL ENCOUNTER (OUTPATIENT)
Dept: RADIOLOGY | Facility: HOSPITAL | Age: 64
Discharge: HOME | End: 2024-02-28
Payer: COMMERCIAL

## 2024-02-28 VITALS
OXYGEN SATURATION: 100 % | RESPIRATION RATE: 16 BRPM | HEART RATE: 96 BPM | TEMPERATURE: 99.6 F | DIASTOLIC BLOOD PRESSURE: 65 MMHG | SYSTOLIC BLOOD PRESSURE: 112 MMHG

## 2024-02-28 DIAGNOSIS — K70.31 ASCITES DUE TO ALCOHOLIC CIRRHOSIS (MULTI): Primary | ICD-10-CM

## 2024-02-28 DIAGNOSIS — K70.31 ALCOHOLIC CIRRHOSIS OF LIVER WITH ASCITES (MULTI): ICD-10-CM

## 2024-02-28 PROCEDURE — 49083 ABD PARACENTESIS W/IMAGING: CPT

## 2024-02-28 PROCEDURE — 49083 ABD PARACENTESIS W/IMAGING: CPT | Performed by: NURSE PRACTITIONER

## 2024-02-28 RX ORDER — ALBUMIN HUMAN 250 G/1000ML
25 SOLUTION INTRAVENOUS ONCE
Status: DISCONTINUED | OUTPATIENT
Start: 2024-02-28 | End: 2024-02-29 | Stop reason: HOSPADM

## 2024-02-28 ASSESSMENT — ENCOUNTER SYMPTOMS
ACTIVITY CHANGE: 1
APPETITE CHANGE: 1
FATIGUE: 1
RESPIRATORY NEGATIVE: 1
MUSCULOSKELETAL NEGATIVE: 1
NEUROLOGICAL NEGATIVE: 1
PSYCHIATRIC NEGATIVE: 1
ABDOMINAL DISTENTION: 1
ABDOMINAL PAIN: 1

## 2024-02-28 NOTE — POST-PROCEDURE NOTE
INTERVENTIONAL RADIOLOGY ADVANCED PRACTICE PROCEDURE  [facility]    A time out was performed and Left Hemiabdomen was examined with US and appropriate entry point was confirmed and marked.   The patient was prepped and draped in a sterile manner, 1% lidocaine was used to anesthesize the skin and subcutaneous tissue.   A 5F Centesis needle was then introduced through the skin into the peritoneal space, the centesis catheter was then threaded without difficulty.   6300 ml of yellow fluid was removed without difficulty. The catheter was then removed.   No immediate complications were noted during and immediately following the procedure.

## 2024-02-28 NOTE — H&P (VIEW-ONLY)
History Of Present Illness  Yolie Moreno is a 63 y.o. female presenting with ascites, cirrhosis.     Past Medical History  Past Medical History:   Diagnosis Date    Alcohol abuse     Bipolar II disorder (CMS/HCC)     Cirrhosis of liver (CMS/HCC)     Cocaine abuse (CMS/HCC)     Esophageal varices (CMS/HCC)     Hepatitis C     Hydronephrosis     MDD (major depressive disorder)     Trichomonal vulvovaginitis 07/30/2022    Trichomonas vaginitis       Surgical History  Past Surgical History:   Procedure Laterality Date    CHOLECYSTECTOMY  11/12/2014    Cholecystectomy    US GUIDED ABDOMINAL PARACENTESIS  6/14/2023    US GUIDED ABDOMINAL PARACENTESIS 6/14/2023 [facility] [place]    US GUIDED ABDOMINAL PARACENTESIS  7/3/2023    US GUIDED ABDOMINAL PARACENTESIS 7/3/2023 [facility] [place]    US GUIDED ABDOMINAL PARACENTESIS  9/7/2023    US GUIDED ABDOMINAL PARACENTESIS 9/7/2023 [facility] [place]    US GUIDED ABDOMINAL PARACENTESIS  9/28/2023    US GUIDED ABDOMINAL PARACENTESIS 9/28/2023 [facility] [place]    US GUIDED ABDOMINAL PARACENTESIS  10/19/2023    US GUIDED ABDOMINAL PARACENTESIS 10/19/2023 [facility] [place]    US GUIDED ABDOMINAL PARACENTESIS  11/9/2023    US GUIDED ABDOMINAL PARACENTESIS 11/9/2023 Carlos Chan, APRN-[facility]    US GUIDED ABDOMINAL PARACENTESIS  11/30/2023    US GUIDED ABDOMINAL PARACENTESIS 11/30/2023 [facility] [place]    US GUIDED ABDOMINAL PARACENTESIS  12/21/2023    US GUIDED ABDOMINAL PARACENTESIS 12/21/2023 [facility] [place]    US GUIDED ABDOMINAL PARACENTESIS  12/26/2023    US GUIDED ABDOMINAL PARACENTESIS 12/26/2023 Carlos Chan, APRN-[facility] [place]        Social History  She reports that she has never smoked. She has never used smokeless tobacco. She reports current alcohol use. She reports that she does not currently use drugs.    Family History  Family History   Problem Relation Name Age of Onset    Diabetes type II Father          Allergies  Patient has no known allergies.    Review of Systems   Constitutional:  Positive for activity change, appetite  change and fatigue.   Respiratory: Negative.     Gastrointestinal:  Positive for abdominal distention and abdominal pain.   Musculoskeletal: Negative.    Skin: Negative.    Neurological: Negative.    Psychiatric/Behavioral: Negative.          Physical Exam  Constitutional:       Appearance: She is ill-appearing.   Cardiovascular:      Rate and Rhythm: Normal rate and regular rhythm.   Pulmonary:      Effort: Pulmonary effort is normal.      Breath sounds: Normal breath sounds.   Abdominal:      General: Bowel sounds are normal. There is distension.      Tenderness: There is abdominal tenderness.   Musculoskeletal:         General: Normal range of motion.   Skin:     General: Skin is warm and dry.      Comments: Previous paracentesis site LUQ intact, glue intact   Neurological:      General: No focal deficit present.      Mental Status: She is alert and oriented to person, place, and time. Mental status is at baseline.   Psychiatric:         Mood and Affect: Mood normal.         Behavior: Behavior normal.         Thought Content: Thought content normal.         Judgment: Judgment normal.          Last Recorded Vitals  There were no vitals taken for this visit.    Relevant Results        INR 12/26/23 1.3  CBC 12/24/23 93     Assessment/Plan   Active Problems:  There are no active Hospital Problems.      paracentesis       I spent 35 minutes in the professional and overall care of this patient.      Carlos Chan, APRN-CNP

## 2024-02-28 NOTE — H&P
History Of Present Illness  Yolie Moreno is a 63 y.o. female presenting with ascites, cirrhosis.     Past Medical History  Past Medical History:   Diagnosis Date    Alcohol abuse     Bipolar II disorder (CMS/HCC)     Cirrhosis of liver (CMS/HCC)     Cocaine abuse (CMS/HCC)     Esophageal varices (CMS/HCC)     Hepatitis C     Hydronephrosis     MDD (major depressive disorder)     Trichomonal vulvovaginitis 07/30/2022    Trichomonas vaginitis       Surgical History  Past Surgical History:   Procedure Laterality Date    CHOLECYSTECTOMY  11/12/2014    Cholecystectomy    US GUIDED ABDOMINAL PARACENTESIS  6/14/2023    US GUIDED ABDOMINAL PARACENTESIS 6/14/2023 [facility] [place] [facility] [place]    US GUIDED ABDOMINAL PARACENTESIS  7/3/2023    US GUIDED ABDOMINAL PARACENTESIS 7/3/2023 [facility] [place]    US GUIDED ABDOMINAL PARACENTESIS  9/7/2023    US GUIDED ABDOMINAL PARACENTESIS 9/7/2023 [facility] [place]    US GUIDED ABDOMINAL PARACENTESIS  9/28/2023    US GUIDED ABDOMINAL PARACENTESIS 9/28/2023 [facility] [place]    US GUIDED ABDOMINAL PARACENTESIS  10/19/2023    US GUIDED ABDOMINAL PARACENTESIS 10/19/2023 [facility] [place]    US GUIDED ABDOMINAL PARACENTESIS  11/9/2023    US GUIDED ABDOMINAL PARACENTESIS 11/9/2023 Carlos Chan, APRN-[facility]    US GUIDED ABDOMINAL PARACENTESIS  11/30/2023    US GUIDED ABDOMINAL PARACENTESIS 11/30/2023 [facility] [place]    US GUIDED ABDOMINAL PARACENTESIS  12/21/2023    US GUIDED ABDOMINAL PARACENTESIS 12/21/2023 [facility] [place]    US GUIDED ABDOMINAL PARACENTESIS  12/26/2023    US GUIDED ABDOMINAL PARACENTESIS 12/26/2023 Carlos Chan, APRN-[facility] [place]        Social History  She reports that she has never smoked. She has never used smokeless tobacco. She reports current alcohol use. She reports that she does not currently use drugs.    Family History  Family History   Problem Relation Name Age of Onset    Diabetes type II Father          Allergies  Patient has no known allergies.    Review of Systems   Constitutional:  Positive for activity change, appetite  change and fatigue.   Respiratory: Negative.     Gastrointestinal:  Positive for abdominal distention and abdominal pain.   Musculoskeletal: Negative.    Skin: Negative.    Neurological: Negative.    Psychiatric/Behavioral: Negative.          Physical Exam  Constitutional:       Appearance: She is ill-appearing.   Cardiovascular:      Rate and Rhythm: Normal rate and regular rhythm.   Pulmonary:      Effort: Pulmonary effort is normal.      Breath sounds: Normal breath sounds.   Abdominal:      General: Bowel sounds are normal. There is distension.      Tenderness: There is abdominal tenderness.   Musculoskeletal:         General: Normal range of motion.   Skin:     General: Skin is warm and dry.      Comments: Previous paracentesis site LUQ intact, glue intact   Neurological:      General: No focal deficit present.      Mental Status: She is alert and oriented to person, place, and time. Mental status is at baseline.   Psychiatric:         Mood and Affect: Mood normal.         Behavior: Behavior normal.         Thought Content: Thought content normal.         Judgment: Judgment normal.          Last Recorded Vitals  There were no vitals taken for this visit.    Relevant Results        INR 12/26/23 1.3  CBC 12/24/23 93     Assessment/Plan   Active Problems:  There are no active Hospital Problems.      paracentesis       I spent 35 minutes in the professional and overall care of this patient.      Carlos Chan, APRN-CNP

## 2024-03-07 ENCOUNTER — PHARMACY VISIT (OUTPATIENT)
Dept: PHARMACY | Facility: CLINIC | Age: 64
End: 2024-03-07
Payer: MEDICAID

## 2024-03-07 ENCOUNTER — HOSPITAL ENCOUNTER (EMERGENCY)
Facility: HOSPITAL | Age: 64
Discharge: HOME | End: 2024-03-07
Attending: EMERGENCY MEDICINE
Payer: COMMERCIAL

## 2024-03-07 ENCOUNTER — CLINICAL SUPPORT (OUTPATIENT)
Dept: EMERGENCY MEDICINE | Facility: HOSPITAL | Age: 64
End: 2024-03-07
Payer: COMMERCIAL

## 2024-03-07 VITALS
DIASTOLIC BLOOD PRESSURE: 76 MMHG | TEMPERATURE: 98.6 F | SYSTOLIC BLOOD PRESSURE: 132 MMHG | RESPIRATION RATE: 20 BRPM | BODY MASS INDEX: 26.57 KG/M2 | HEIGHT: 66 IN | HEART RATE: 116 BPM | OXYGEN SATURATION: 96 % | WEIGHT: 165.34 LBS

## 2024-03-07 DIAGNOSIS — F31.81: ICD-10-CM

## 2024-03-07 DIAGNOSIS — E78.5 DYSLIPIDEMIA: ICD-10-CM

## 2024-03-07 DIAGNOSIS — K70.31 ALCOHOLIC CIRRHOSIS OF LIVER WITH ASCITES (MULTI): Primary | ICD-10-CM

## 2024-03-07 LAB
ALBUMIN SERPL BCP-MCNC: 3.1 G/DL (ref 3.4–5)
ALP SERPL-CCNC: 149 U/L (ref 33–136)
ALT SERPL W P-5'-P-CCNC: 41 U/L (ref 7–45)
ANION GAP SERPL CALC-SCNC: 13 MMOL/L (ref 10–20)
AST SERPL W P-5'-P-CCNC: 112 U/L (ref 9–39)
ATRIAL RATE: 113 BPM
BASOPHILS # BLD AUTO: 0.1 X10*3/UL (ref 0–0.1)
BASOPHILS NFR BLD AUTO: 2 %
BILIRUB SERPL-MCNC: 2.4 MG/DL (ref 0–1.2)
BNP SERPL-MCNC: 12 PG/ML (ref 0–99)
BUN SERPL-MCNC: 7 MG/DL (ref 6–23)
CALCIUM SERPL-MCNC: 8.3 MG/DL (ref 8.6–10.6)
CHLORIDE SERPL-SCNC: 106 MMOL/L (ref 98–107)
CO2 SERPL-SCNC: 23 MMOL/L (ref 21–32)
CREAT SERPL-MCNC: 0.51 MG/DL (ref 0.5–1.05)
EGFRCR SERPLBLD CKD-EPI 2021: >90 ML/MIN/1.73M*2
EOSINOPHIL # BLD AUTO: 0.08 X10*3/UL (ref 0–0.7)
EOSINOPHIL NFR BLD AUTO: 1.6 %
ERYTHROCYTE [DISTWIDTH] IN BLOOD BY AUTOMATED COUNT: 16.3 % (ref 11.5–14.5)
GLUCOSE SERPL-MCNC: 68 MG/DL (ref 74–99)
HCT VFR BLD AUTO: 22.1 % (ref 36–46)
HGB BLD-MCNC: 7.8 G/DL (ref 12–16)
IMM GRANULOCYTES # BLD AUTO: 0.03 X10*3/UL (ref 0–0.7)
IMM GRANULOCYTES NFR BLD AUTO: 0.6 % (ref 0–0.9)
LYMPHOCYTES # BLD AUTO: 0.88 X10*3/UL (ref 1.2–4.8)
LYMPHOCYTES NFR BLD AUTO: 17.3 %
MCH RBC QN AUTO: 28.3 PG (ref 26–34)
MCHC RBC AUTO-ENTMCNC: 35.3 G/DL (ref 32–36)
MCV RBC AUTO: 80 FL (ref 80–100)
MONOCYTES # BLD AUTO: 0.94 X10*3/UL (ref 0.1–1)
MONOCYTES NFR BLD AUTO: 18.4 %
NEUTROPHILS # BLD AUTO: 3.07 X10*3/UL (ref 1.2–7.7)
NEUTROPHILS NFR BLD AUTO: 60.1 %
NRBC BLD-RTO: 0 /100 WBCS (ref 0–0)
P AXIS: 14 DEGREES
PLATELET # BLD AUTO: 107 X10*3/UL (ref 150–450)
POTASSIUM SERPL-SCNC: 5 MMOL/L (ref 3.5–5.3)
PR INTERVAL: 128 MS
PROT SERPL-MCNC: 7.6 G/DL (ref 6.4–8.2)
Q ONSET: 222 MS
QRS COUNT: 19 BEATS
QRS DURATION: 84 MS
QT INTERVAL: 342 MS
QTC CALCULATION(BAZETT): 469 MS
QTC FREDERICIA: 422 MS
R AXIS: 3 DEGREES
RBC # BLD AUTO: 2.76 X10*6/UL (ref 4–5.2)
RBC MORPH BLD: NORMAL
SODIUM SERPL-SCNC: 137 MMOL/L (ref 136–145)
T AXIS: 19 DEGREES
T OFFSET: 393 MS
TARGETS BLD QL SMEAR: NORMAL
VENTRICULAR RATE: 113 BPM
WBC # BLD AUTO: 5.1 X10*3/UL (ref 4.4–11.3)

## 2024-03-07 PROCEDURE — 83880 ASSAY OF NATRIURETIC PEPTIDE: CPT

## 2024-03-07 PROCEDURE — 93010 ELECTROCARDIOGRAM REPORT: CPT | Performed by: EMERGENCY MEDICINE

## 2024-03-07 PROCEDURE — 99285 EMERGENCY DEPT VISIT HI MDM: CPT | Performed by: EMERGENCY MEDICINE

## 2024-03-07 PROCEDURE — 99283 EMERGENCY DEPT VISIT LOW MDM: CPT

## 2024-03-07 PROCEDURE — 85025 COMPLETE CBC W/AUTO DIFF WBC: CPT

## 2024-03-07 PROCEDURE — 93005 ELECTROCARDIOGRAM TRACING: CPT

## 2024-03-07 PROCEDURE — 80053 COMPREHEN METABOLIC PANEL: CPT

## 2024-03-07 PROCEDURE — RXMED WILLOW AMBULATORY MEDICATION CHARGE

## 2024-03-07 RX ORDER — ATORVASTATIN CALCIUM 20 MG/1
20 TABLET, FILM COATED ORAL NIGHTLY
Qty: 30 TABLET | Refills: 0 | Status: SHIPPED | OUTPATIENT
Start: 2024-03-07 | End: 2024-04-23 | Stop reason: SDUPTHER

## 2024-03-07 RX ORDER — LANOLIN ALCOHOL/MO/W.PET/CERES
100 CREAM (GRAM) TOPICAL DAILY
Qty: 30 TABLET | Refills: 0 | Status: SHIPPED | OUTPATIENT
Start: 2024-03-07 | End: 2024-04-23 | Stop reason: SDUPTHER

## 2024-03-07 RX ORDER — ARIPIPRAZOLE 5 MG/1
5 TABLET ORAL DAILY
Qty: 30 TABLET | Refills: 0 | Status: SHIPPED | OUTPATIENT
Start: 2024-03-07 | End: 2024-04-23 | Stop reason: SDUPTHER

## 2024-03-07 RX ORDER — CARVEDILOL 6.25 MG/1
6.25 TABLET ORAL 2 TIMES DAILY
Qty: 60 TABLET | Refills: 0 | Status: SHIPPED | OUTPATIENT
Start: 2024-03-07 | End: 2024-04-23 | Stop reason: SDUPTHER

## 2024-03-07 RX ORDER — FOLIC ACID 1 MG/1
1 TABLET ORAL DAILY
Qty: 30 TABLET | Refills: 0 | Status: SHIPPED | OUTPATIENT
Start: 2024-03-07 | End: 2024-04-06

## 2024-03-07 RX ORDER — FUROSEMIDE 40 MG/1
40 TABLET ORAL DAILY
Qty: 30 TABLET | Refills: 0 | Status: SHIPPED | OUTPATIENT
Start: 2024-03-07 | End: 2024-04-23 | Stop reason: SDUPTHER

## 2024-03-07 RX ORDER — SPIRONOLACTONE 50 MG/1
50 TABLET, FILM COATED ORAL DAILY
Qty: 30 TABLET | Refills: 0 | Status: SHIPPED | OUTPATIENT
Start: 2024-03-07 | End: 2024-04-23 | Stop reason: SDUPTHER

## 2024-03-07 ASSESSMENT — COLUMBIA-SUICIDE SEVERITY RATING SCALE - C-SSRS
2. HAVE YOU ACTUALLY HAD ANY THOUGHTS OF KILLING YOURSELF?: NO
2. HAVE YOU ACTUALLY HAD ANY THOUGHTS OF KILLING YOURSELF?: NO
6. HAVE YOU EVER DONE ANYTHING, STARTED TO DO ANYTHING, OR PREPARED TO DO ANYTHING TO END YOUR LIFE?: NO
1. SINCE LAST CONTACT, HAVE YOU WISHED YOU WERE DEAD OR WISHED YOU COULD GO TO SLEEP AND NOT WAKE UP?: NO
1. IN THE PAST MONTH, HAVE YOU WISHED YOU WERE DEAD OR WISHED YOU COULD GO TO SLEEP AND NOT WAKE UP?: NO
6. HAVE YOU EVER DONE ANYTHING, STARTED TO DO ANYTHING, OR PREPARED TO DO ANYTHING TO END YOUR LIFE?: NO

## 2024-03-07 ASSESSMENT — LIFESTYLE VARIABLES
EVER FELT BAD OR GUILTY ABOUT YOUR DRINKING: NO
HAVE YOU EVER FELT YOU SHOULD CUT DOWN ON YOUR DRINKING: YES
EVER HAD A DRINK FIRST THING IN THE MORNING TO STEADY YOUR NERVES TO GET RID OF A HANGOVER: YES
HAVE PEOPLE ANNOYED YOU BY CRITICIZING YOUR DRINKING: NO

## 2024-03-07 ASSESSMENT — PAIN SCALES - GENERAL: PAINLEVEL_OUTOF10: 2

## 2024-03-07 ASSESSMENT — PAIN DESCRIPTION - PAIN TYPE: TYPE: CHRONIC PAIN

## 2024-03-07 ASSESSMENT — PAIN - FUNCTIONAL ASSESSMENT: PAIN_FUNCTIONAL_ASSESSMENT: 0-10

## 2024-03-07 NOTE — ED PROVIDER NOTES
HPI   Chief Complaint   Patient presents with    Abdominal Pain         History provided by:  Patient      63-year-old Hep C, decompensated alcoholic cirrhosis c/b ascites (requiring paracentesis q2wk, last para on 2/28/24), ETOH abuse, bipolar II disorder with psychotic features, MDD, dysplipidemia, esophageal varices, recent h/o non-occolusive superior mesenteric thrombosis (left AMA on 12/26/23) who presented with worsening ascites. Pt currently denies abdo pain, n/v/change in bowel habit. No fever/chills. No confusion, no hematemesis/hematochezia. Last bowel movement today. Endorses bilateral leg swelling but denies pain. No chest pain /SOB/palpitation/dizziness/ LOC. Pt also reported 2 episodes of mental breakdown couple of days ago, triggered by personal social issues but denies SI/HI/psychosis currently. Pt also reported not taking any of her regular home meds for past 3 months including aripiprazole, furosemide, spironolactone and labetalol due to running out of prescription. Pt also endorses heavy alcohol use (6 large cans of beer every day), last drink was yesterday.     Per chart review, on 12/26/23: Non-occlusive thrombus with superior mesenteric vein noted on imaging, Hepatology was consulted and recommended against anticoagulation due to known large esophageal varices on 8/2022 and ongoing alcohol abuse.  Risk of anticoagulation outweigh benefits in this case.  GI plan for EGD on 12/26/2023 for variceal ligation but pt left AMA.                     No data recorded                   Patient History   Past Medical History:   Diagnosis Date    Alcohol abuse     Bipolar II disorder (CMS/HCC)     Cirrhosis of liver (CMS/HCC)     Cocaine abuse (CMS/HCC)     Esophageal varices (CMS/HCC)     Hepatitis C     Hydronephrosis     MDD (major depressive disorder)     Trichomonal vulvovaginitis 07/30/2022    Trichomonas vaginitis     Past Surgical History:   Procedure Laterality Date    CHOLECYSTECTOMY  11/12/2014  "   Cholecystectomy    US GUIDED ABDOMINAL PARACENTESIS  6/14/2023    US GUIDED ABDOMINAL PARACENTESIS 6/14/2023 [facility] [place]    US GUIDED ABDOMINAL PARACENTESIS  7/3/2023    US GUIDED ABDOMINAL PARACENTESIS 7/3/2023 [facility] [place]    US GUIDED ABDOMINAL PARACENTESIS  9/7/2023    US GUIDED ABDOMINAL PARACENTESIS 9/7/2023 [facility] [place]    US GUIDED ABDOMINAL PARACENTESIS  9/28/2023    US GUIDED ABDOMINAL PARACENTESIS 9/28/2023 [facility] [place]    US GUIDED ABDOMINAL PARACENTESIS  10/19/2023    US GUIDED ABDOMINAL PARACENTESIS 10/19/2023 [facility] [place]    US GUIDED ABDOMINAL PARACENTESIS  11/9/2023    US GUIDED ABDOMINAL PARACENTESIS 11/9/2023 Carlos Chan, APRN-CNP [facility]    US GUIDED ABDOMINAL PARACENTESIS  11/30/2023    US GUIDED ABDOMINAL PARACENTESIS 11/30/2023 [facility] [place] [facility] [place]    US GUIDED ABDOMINAL PARACENTESIS  12/21/2023    US GUIDED ABDOMINAL PARACENTESIS 12/21/2023 [facility] [place]    US GUIDED ABDOMINAL PARACENTESIS  12/26/2023    US GUIDED ABDOMINAL PARACENTESIS 12/26/2023 Carlos Chan, APRN-CNP [facility] [place]     Family History   Problem Relation Name Age of Onset    Diabetes type II Father       Social History     Tobacco Use    Smoking status: Never    Smokeless tobacco: Never   Vaping Use    Vaping Use: Never used   Substance Use Topics    Alcohol use: Yes    Drug use: Not Currently       Physical Exam     /76   Pulse (!) 116   Temp 37 °C (98.6 °F) (Oral)   Resp 20   Ht 1.676 m (5' 6\")   Wt 75 kg (165 lb 5.5 oz)   BMI 26.69 kg/m²       Physical Exam  Constitutional:       Appearance: She is well-developed.   Eyes:      General: No scleral icterus.     Extraocular Movements: Extraocular movements intact.      Pupils: Pupils are equal, round, and reactive to light.   Cardiovascular:      Rate and Rhythm: Regular rhythm. Tachycardia present.      Heart sounds: Normal heart sounds.   Pulmonary:      Effort: Pulmonary effort is normal.      Breath sounds: Normal breath sounds.   Abdominal:      General: Bowel sounds are normal. There is distension. "      Palpations: Abdomen is soft. There is shifting dullness.      Tenderness: There is no abdominal tenderness.      Comments: Distended belly, non-tender on palpation, normal bowel sound, shifting dullness noted.    Musculoskeletal:      Right lower leg: Edema present.      Left lower leg: Edema present.      Comments: Bilateral pitting edema +2, non-tender on palpation    Skin:     General: Skin is warm.      Capillary Refill: Capillary refill takes less than 2 seconds.   Neurological:      General: No focal deficit present.      Mental Status: She is alert and oriented to person, place, and time.   Psychiatric:         Mood and Affect: Mood normal.         Behavior: Behavior normal.         ED Course & MDM   Diagnoses as of 03/07/24 1724   Dyslipidemia   Alcoholic cirrhosis of liver with ascites (CMS/HCC)   Bipolar II disorder with atypical features (CMS/HCC)       Medical Decision Making  63-year-old Hep C, decompensated alcoholic cirrhosis c/b ascites (requiring paracentesis q2wk, last para on 2/28/24), ETOH abuse, bipolar II disorder with psychotic features, MDD, dysplipidemia, esophageal varices, recent h/o non-occolusive superior mesenteric thrombosis (left AMA on 12/26/23) who presented with worsening ascites but no abdominal pain, fever/chills or concerns of bleeding. Pt is noted to be tachycardic on initial exam () with regular pulses and asymptomatic. Rest of the vitals were unremarkable. Physical exam was notable for distended abdomen with positive shifting dullness but no tenderness. Bilateral leg edema was also noted. No crackles/signs of acute resp distress on lung exam. Ddx include ascites 2/2 decompensated cirrhosis in the setting of continuous excess use of ETOH vs medication non-compliance. Low concern for SBP. Tachycardia likely 2/2 ETOH withdrawal. Labs ordered which was notable anemia (Hb 7.8 likely 2/2 dilutional iso fluid retention, chronic thrombocytopenia, mildly elevated ALP and T  bilirubin, normal RFP, normal BNP, coag was ordered but cancelled due to clotted sample.) EKG was performed which showed sinus tachy, no acute ST changes.  Pt refused to get paracentesis by ED provider. IR consulted but unable to perform para today due to staffing shortage.  Given that pt is asymptomatic and stable overall, plan to discharge with home medication (including lasix and spironolactone) and outpatient IR referral to reschedule next therapeutic paracentesis sooner.      Ordered meds to beds since pt had run out of home meds (Also discussed with patient re: the importance of medication compliance to prevent frequent worsening of her ascites).     Dispo: Discharge    Seen and discussed with Dr Adam/Dr Rosen         Amount and/or Complexity of Data Reviewed  Labs:  Decision-making details documented in ED Course.  ECG/medicine tests: ordered.     Details: Sinus tachy ( bpm), regular rhythm, normal axis, no ST changes        Procedure  Procedures     Shaw Adair MD  Resident  03/07/24 9731

## 2024-03-07 NOTE — ED TRIAGE NOTES
Pt to ED after her belly became more distended despite having her ascites drained on 2/28 in her GI dr office. Pt states she is very uncomfortable and unable to walk appropriately. Pt is a daily drinker and drinks all day until she is tired. States does not drink high proof beer  
Other (please specify)...

## 2024-03-07 NOTE — DISCHARGE INSTRUCTIONS
Dear Ms Josh,     You were seen in the ED today for worsening ascites likely in the setting of not taking your home medication and excess alcohol use. You denied any belly pain/fever/signs of active bleeding. Labs were ordered which was fairly at baseline. There are low concern for acute infection. We ordered your regular medication in ED and advise that you take them as prescribed in order to prevent worsening of fluid accumulation. We have also requested IR (Interventional Radiologist) to reschedule your next therapeutic paracentesis sooner. Please call them at [phone] if you do not hear back from them in the next 3-4 days. We also advise that you follow up with your primary care provider. We strongly recommend against alcohol use in the context of worsening liver disease.     Please return to ED if your symptoms worsens, notice abdominal pain, fever, confusion, chest pain/difficulty breathing, signs of active bleeding.

## 2024-03-11 ENCOUNTER — HOSPITAL ENCOUNTER (OUTPATIENT)
Dept: RADIOLOGY | Facility: HOSPITAL | Age: 64
Discharge: HOME | End: 2024-03-11
Payer: COMMERCIAL

## 2024-03-11 ENCOUNTER — CLINICAL SUPPORT (OUTPATIENT)
Dept: NUTRITION | Facility: HOSPITAL | Age: 64
End: 2024-03-11
Payer: COMMERCIAL

## 2024-03-11 ENCOUNTER — PREP FOR PROCEDURE (OUTPATIENT)
Dept: RADIOLOGY | Facility: HOSPITAL | Age: 64
End: 2024-03-11

## 2024-03-11 VITALS
SYSTOLIC BLOOD PRESSURE: 109 MMHG | DIASTOLIC BLOOD PRESSURE: 80 MMHG | TEMPERATURE: 99.4 F | HEART RATE: 86 BPM | RESPIRATION RATE: 19 BRPM | OXYGEN SATURATION: 99 %

## 2024-03-11 DIAGNOSIS — K70.31 ASCITES DUE TO ALCOHOLIC CIRRHOSIS (MULTI): ICD-10-CM

## 2024-03-11 DIAGNOSIS — K70.31 ALCOHOLIC CIRRHOSIS OF LIVER WITH ASCITES (MULTI): Primary | ICD-10-CM

## 2024-03-11 PROCEDURE — 49083 ABD PARACENTESIS W/IMAGING: CPT | Performed by: NURSE PRACTITIONER

## 2024-03-11 PROCEDURE — 49083 ABD PARACENTESIS W/IMAGING: CPT

## 2024-03-11 RX ORDER — ALBUMIN HUMAN 250 G/1000ML
50 SOLUTION INTRAVENOUS ONCE
Status: DISCONTINUED | OUTPATIENT
Start: 2024-03-11 | End: 2024-03-12 | Stop reason: HOSPADM

## 2024-03-11 NOTE — POST-PROCEDURE NOTE
INTERVENTIONAL RADIOLOGY ADVANCED PRACTICE PROCEDURE  [facility] [place]    A time out was performed and Right Hemiabdomen was examined with US and appropriate entry point was confirmed and marked.   The patient was prepped and draped in a sterile manner, 1% lidocaine was used to anesthesize the skin and subcutaneous tissue.   A 5F Centesis needle was then introduced through the skin into the peritoneal space, the centesis catheter was then threaded without difficulty.   7550 ml of yellow fluid was removed without difficulty. The catheter was then removed.   No immediate complications were noted during and immediately following the procedure.

## 2024-03-11 NOTE — PROGRESS NOTES
[employer]  Diet Recommendation 1: Sodium, Low  Diet Recommendation 2: Protein, High  Diet Recommendation 3: Healthy Eating  Food Intolerance Avoidance: NKFA  Household Size: 3 Family Members  Interventions: Referral Number: 3rd 6 Mo Referral 1.5 yrs (Referrals may not be consecutive)  Interventions: Visit Number: 4 of 6 Visits - Max 6 Visits/Referral Each 6 Mo Period  Education Today: [employer]  Follow Up Notes for Future Visits: Walk In  Grains: 0-25% Whole  Fruit: Fresh - 100%  Vegetables: 0-25% Fresh  Proteins: 0 Plant-based Items  Dairy: % Lowfat  Originating Site of Referral Order: [facility]  Initials of RD Assisting Today:

## 2024-03-12 PROBLEM — Z59.41 FOOD INSECURITY: Status: ACTIVE | Noted: 2024-03-12

## 2024-03-12 PROBLEM — N39.0 URINARY TRACT INFECTION: Status: ACTIVE | Noted: 2024-03-12

## 2024-03-12 PROBLEM — M25.579 CHRONIC ANKLE PAIN: Status: ACTIVE | Noted: 2022-11-14

## 2024-03-12 PROBLEM — G89.29 CHRONIC ANKLE PAIN: Status: ACTIVE | Noted: 2022-11-14

## 2024-03-12 PROBLEM — Z12.4 SCREENING FOR MALIGNANT NEOPLASM OF CERVIX: Status: ACTIVE | Noted: 2024-03-12

## 2024-03-12 PROBLEM — R60.0 LOCALIZED EDEMA: Status: ACTIVE | Noted: 2023-11-08

## 2024-03-12 PROBLEM — R74.01 NONSPECIFIC ELEVATION OF LEVELS OF TRANSAMINASE OR LACTIC ACID DEHYDROGENASE (LDH): Status: ACTIVE | Noted: 2023-10-05

## 2024-03-12 PROBLEM — R18.8 OTHER ASCITES: Status: ACTIVE | Noted: 2023-10-05

## 2024-03-12 PROBLEM — R74.02 NONSPECIFIC ELEVATION OF LEVELS OF TRANSAMINASE OR LACTIC ACID DEHYDROGENASE (LDH): Status: ACTIVE | Noted: 2023-10-05

## 2024-03-12 PROBLEM — Z51.89 ENCOUNTER FOR OTHER SPECIFIED AFTERCARE: Status: ACTIVE | Noted: 2024-02-14

## 2024-03-12 PROBLEM — R06.09 DYSPNEA ON EXERTION: Status: ACTIVE | Noted: 2023-10-05

## 2024-03-12 PROBLEM — B18.2 CHRONIC HEPATITIS C VIRUS INFECTION (MULTI): Status: ACTIVE | Noted: 2023-05-09

## 2024-03-12 PROBLEM — R10.9 ABDOMINAL PAIN: Status: ACTIVE | Noted: 2024-03-12

## 2024-03-14 ENCOUNTER — APPOINTMENT (OUTPATIENT)
Dept: NUTRITION | Facility: HOSPITAL | Age: 64
End: 2024-03-14
Payer: COMMERCIAL

## 2024-03-25 ENCOUNTER — HOSPITAL ENCOUNTER (OUTPATIENT)
Dept: RADIOLOGY | Facility: HOSPITAL | Age: 64
Discharge: HOME | End: 2024-03-25
Payer: COMMERCIAL

## 2024-03-25 VITALS
HEART RATE: 81 BPM | TEMPERATURE: 98.6 F | SYSTOLIC BLOOD PRESSURE: 99 MMHG | OXYGEN SATURATION: 100 % | DIASTOLIC BLOOD PRESSURE: 63 MMHG | RESPIRATION RATE: 81 BRPM

## 2024-03-25 DIAGNOSIS — K70.31 ALCOHOLIC CIRRHOSIS OF LIVER WITH ASCITES (MULTI): ICD-10-CM

## 2024-03-25 PROCEDURE — 49083 ABD PARACENTESIS W/IMAGING: CPT

## 2024-03-25 PROCEDURE — 49083 ABD PARACENTESIS W/IMAGING: CPT | Performed by: NURSE PRACTITIONER

## 2024-03-25 NOTE — POST-PROCEDURE NOTE
INTERVENTIONAL RADIOLOGY ADVANCED PRACTICE PROCEDURE  [facility]    A time out was performed and Right Hemiabdomen was examined with US and appropriate entry point was confirmed and marked.   The patient was prepped and draped in a sterile manner, 1% lidocaine was used to anesthesize the skin and subcutaneous tissue.   A 5F Centesis needle was then introduced through the skin into the peritoneal space, the centesis catheter was then threaded without difficulty.   5300 ml of yellow fluid was removed without difficulty. The catheter was then removed.   No immediate complications were noted during and immediately following the procedure.

## 2024-04-08 ENCOUNTER — HOSPITAL ENCOUNTER (OUTPATIENT)
Dept: RADIOLOGY | Facility: HOSPITAL | Age: 64
Discharge: HOME | End: 2024-04-08
Payer: COMMERCIAL

## 2024-04-08 VITALS
DIASTOLIC BLOOD PRESSURE: 70 MMHG | TEMPERATURE: 97.2 F | RESPIRATION RATE: 16 BRPM | HEART RATE: 81 BPM | SYSTOLIC BLOOD PRESSURE: 112 MMHG

## 2024-04-08 DIAGNOSIS — K70.31 ALCOHOLIC CIRRHOSIS OF LIVER WITH ASCITES (MULTI): ICD-10-CM

## 2024-04-08 PROCEDURE — P9047 ALBUMIN (HUMAN), 25%, 50ML: HCPCS | Mod: JZ,SE | Performed by: NURSE PRACTITIONER

## 2024-04-08 PROCEDURE — 2500000004 HC RX 250 GENERAL PHARMACY W/ HCPCS (ALT 636 FOR OP/ED): Mod: JZ,SE | Performed by: NURSE PRACTITIONER

## 2024-04-08 PROCEDURE — 49083 ABD PARACENTESIS W/IMAGING: CPT

## 2024-04-08 PROCEDURE — 49083 ABD PARACENTESIS W/IMAGING: CPT | Performed by: NURSE PRACTITIONER

## 2024-04-08 RX ORDER — ALBUMIN HUMAN 250 G/1000ML
50 SOLUTION INTRAVENOUS ONCE
Status: COMPLETED | OUTPATIENT
Start: 2024-04-08 | End: 2024-04-08

## 2024-04-08 RX ADMIN — ALBUMIN HUMAN 50 G: 0.25 SOLUTION INTRAVENOUS at 10:30

## 2024-04-08 NOTE — POST-PROCEDURE NOTE
INTERVENTIONAL RADIOLOGY ADVANCED PRACTICE PROCEDURE  [facility]    A time out was performed and Left Hemiabdomen was examined with US and appropriate entry point was confirmed and marked.   The patient was prepped and draped in a sterile manner, 1% lidocaine was used to anesthesize the skin and subcutaneous tissue.   A 5F Centesis needle was then introduced through the skin into the peritoneal space, the centesis catheter was then threaded without difficulty.   7200 ml of yellow fluid was removed without difficulty. The catheter was then removed.   No immediate complications were noted during and immediately following the procedure.

## 2024-04-22 ENCOUNTER — HOSPITAL ENCOUNTER (OUTPATIENT)
Dept: RADIOLOGY | Facility: HOSPITAL | Age: 64
Discharge: HOME | End: 2024-04-22
Payer: COMMERCIAL

## 2024-04-22 ENCOUNTER — CLINICAL SUPPORT (OUTPATIENT)
Dept: EMERGENCY MEDICINE | Facility: HOSPITAL | Age: 64
End: 2024-04-22
Payer: COMMERCIAL

## 2024-04-22 ENCOUNTER — APPOINTMENT (OUTPATIENT)
Dept: RADIOLOGY | Facility: HOSPITAL | Age: 64
End: 2024-04-22
Payer: COMMERCIAL

## 2024-04-22 ENCOUNTER — HOSPITAL ENCOUNTER (EMERGENCY)
Facility: HOSPITAL | Age: 64
End: 2024-04-22
Payer: COMMERCIAL

## 2024-04-22 ENCOUNTER — HOSPITAL ENCOUNTER (EMERGENCY)
Facility: HOSPITAL | Age: 64
Discharge: HOME | End: 2024-04-22
Attending: EMERGENCY MEDICINE
Payer: COMMERCIAL

## 2024-04-22 VITALS
HEIGHT: 66 IN | BODY MASS INDEX: 26.03 KG/M2 | WEIGHT: 162 LBS | HEART RATE: 85 BPM | SYSTOLIC BLOOD PRESSURE: 95 MMHG | DIASTOLIC BLOOD PRESSURE: 65 MMHG | OXYGEN SATURATION: 100 % | RESPIRATION RATE: 18 BRPM | TEMPERATURE: 97.7 F

## 2024-04-22 VITALS
OXYGEN SATURATION: 100 % | RESPIRATION RATE: 18 BRPM | HEART RATE: 79 BPM | DIASTOLIC BLOOD PRESSURE: 59 MMHG | SYSTOLIC BLOOD PRESSURE: 93 MMHG | TEMPERATURE: 98.1 F

## 2024-04-22 DIAGNOSIS — R06.02 SHORTNESS OF BREATH: Primary | ICD-10-CM

## 2024-04-22 DIAGNOSIS — K70.31 ALCOHOLIC CIRRHOSIS OF LIVER WITH ASCITES (MULTI): ICD-10-CM

## 2024-04-22 DIAGNOSIS — Z00.00 HEALTHCARE MAINTENANCE: ICD-10-CM

## 2024-04-22 DIAGNOSIS — R18.8 CIRRHOSIS OF LIVER WITH ASCITES, UNSPECIFIED HEPATIC CIRRHOSIS TYPE (MULTI): ICD-10-CM

## 2024-04-22 DIAGNOSIS — K74.60 CIRRHOSIS OF LIVER WITH ASCITES, UNSPECIFIED HEPATIC CIRRHOSIS TYPE (MULTI): ICD-10-CM

## 2024-04-22 DIAGNOSIS — R60.0 EDEMA OF BOTH LEGS: ICD-10-CM

## 2024-04-22 LAB
ALBUMIN SERPL BCP-MCNC: 2.7 G/DL (ref 3.4–5)
ALP SERPL-CCNC: 105 U/L (ref 33–136)
ALT SERPL W P-5'-P-CCNC: 34 U/L (ref 7–45)
ANION GAP BLDV CALCULATED.4IONS-SCNC: 10 MMOL/L (ref 10–25)
ANION GAP SERPL CALC-SCNC: 8 MMOL/L (ref 10–20)
AST SERPL W P-5'-P-CCNC: 68 U/L (ref 9–39)
BASE EXCESS BLDV CALC-SCNC: -1.2 MMOL/L (ref -2–3)
BASOPHILS # BLD AUTO: 0.06 X10*3/UL (ref 0–0.1)
BASOPHILS NFR BLD AUTO: 1.3 %
BASOPHILS NFR FLD MANUAL: 0 %
BILIRUB SERPL-MCNC: 1.4 MG/DL (ref 0–1.2)
BLASTS NFR FLD MANUAL: 0 %
BNP SERPL-MCNC: 41 PG/ML (ref 0–99)
BODY TEMPERATURE: 37 DEGREES CELSIUS
BUN SERPL-MCNC: 6 MG/DL (ref 6–23)
CA-I BLDV-SCNC: 1.15 MMOL/L (ref 1.1–1.33)
CALCIUM SERPL-MCNC: 8 MG/DL (ref 8.6–10.6)
CHLORIDE BLDV-SCNC: 109 MMOL/L (ref 98–107)
CHLORIDE SERPL-SCNC: 110 MMOL/L (ref 98–107)
CLARITY FLD: ABNORMAL
CO2 SERPL-SCNC: 24 MMOL/L (ref 21–32)
COLOR FLD: YELLOW
CREAT SERPL-MCNC: 0.64 MG/DL (ref 0.5–1.05)
EGFRCR SERPLBLD CKD-EPI 2021: >90 ML/MIN/1.73M*2
EOSINOPHIL # BLD AUTO: 0.19 X10*3/UL (ref 0–0.7)
EOSINOPHIL NFR BLD AUTO: 4.2 %
EOSINOPHIL NFR FLD MANUAL: 0 %
ERYTHROCYTE [DISTWIDTH] IN BLOOD BY AUTOMATED COUNT: 17.9 % (ref 11.5–14.5)
GLUCOSE BLDV-MCNC: 93 MG/DL (ref 74–99)
GLUCOSE SERPL-MCNC: 89 MG/DL (ref 74–99)
HCO3 BLDV-SCNC: 23.6 MMOL/L (ref 22–26)
HCT VFR BLD AUTO: 31.5 % (ref 36–46)
HCT VFR BLD EST: 32 % (ref 36–46)
HGB BLD-MCNC: 10.1 G/DL (ref 12–16)
HGB BLDV-MCNC: 10.6 G/DL (ref 12–16)
IMM GRANULOCYTES # BLD AUTO: 0.01 X10*3/UL (ref 0–0.7)
IMM GRANULOCYTES NFR BLD AUTO: 0.2 % (ref 0–0.9)
IMMATURE GRANULOCYTES IN FLUID: 0 %
INHALED O2 CONCENTRATION: 21 %
LACTATE BLDV-SCNC: 1 MMOL/L (ref 0.4–2)
LYMPHOCYTES # BLD AUTO: 1.24 X10*3/UL (ref 1.2–4.8)
LYMPHOCYTES NFR BLD AUTO: 27.7 %
LYMPHOCYTES NFR FLD MANUAL: 34 %
MCH RBC QN AUTO: 27.2 PG (ref 26–34)
MCHC RBC AUTO-ENTMCNC: 32.1 G/DL (ref 32–36)
MCV RBC AUTO: 85 FL (ref 80–100)
MONOCYTES # BLD AUTO: 1.12 X10*3/UL (ref 0.1–1)
MONOCYTES NFR BLD AUTO: 25 %
MONOS+MACROS NFR FLD MANUAL: 64 %
NEUTROPHILS # BLD AUTO: 1.86 X10*3/UL (ref 1.2–7.7)
NEUTROPHILS NFR BLD AUTO: 41.6 %
NEUTROPHILS NFR FLD MANUAL: 2 %
NRBC BLD-RTO: 0 /100 WBCS (ref 0–0)
OTHER CELLS NFR FLD MANUAL: 0 %
OXYHGB MFR BLDV: 64.9 % (ref 45–75)
PCO2 BLDV: 39 MM HG (ref 41–51)
PH BLDV: 7.39 PH (ref 7.33–7.43)
PLASMA CELLS NFR FLD MANUAL: 0 %
PLATELET # BLD AUTO: 89 X10*3/UL (ref 150–450)
PO2 BLDV: 46 MM HG (ref 35–45)
POTASSIUM BLDV-SCNC: 3.8 MMOL/L (ref 3.5–5.3)
POTASSIUM SERPL-SCNC: 3.7 MMOL/L (ref 3.5–5.3)
PROT SERPL-MCNC: 6.4 G/DL (ref 6.4–8.2)
RBC # BLD AUTO: 3.71 X10*6/UL (ref 4–5.2)
RBC # FLD AUTO: 1000 /UL
SAO2 % BLDV: 66 % (ref 45–75)
SODIUM BLDV-SCNC: 139 MMOL/L (ref 136–145)
SODIUM SERPL-SCNC: 138 MMOL/L (ref 136–145)
TOTAL CELLS COUNTED FLD: 100
WBC # BLD AUTO: 4.5 X10*3/UL (ref 4.4–11.3)
WBC # FLD AUTO: 78 /UL

## 2024-04-22 PROCEDURE — 2500000001 HC RX 250 WO HCPCS SELF ADMINISTERED DRUGS (ALT 637 FOR MEDICARE OP): Mod: SE

## 2024-04-22 PROCEDURE — 84132 ASSAY OF SERUM POTASSIUM: CPT | Mod: 59 | Performed by: EMERGENCY MEDICINE

## 2024-04-22 PROCEDURE — 89051 BODY FLUID CELL COUNT: CPT | Performed by: NURSE PRACTITIONER

## 2024-04-22 PROCEDURE — 49083 ABD PARACENTESIS W/IMAGING: CPT | Performed by: NURSE PRACTITIONER

## 2024-04-22 PROCEDURE — 2500000004 HC RX 250 GENERAL PHARMACY W/ HCPCS (ALT 636 FOR OP/ED): Mod: JZ,SE | Performed by: NURSE PRACTITIONER

## 2024-04-22 PROCEDURE — 93005 ELECTROCARDIOGRAM TRACING: CPT | Mod: 59

## 2024-04-22 PROCEDURE — 85025 COMPLETE CBC W/AUTO DIFF WBC: CPT | Performed by: EMERGENCY MEDICINE

## 2024-04-22 PROCEDURE — 87205 SMEAR GRAM STAIN: CPT | Performed by: NURSE PRACTITIONER

## 2024-04-22 PROCEDURE — 71045 X-RAY EXAM CHEST 1 VIEW: CPT

## 2024-04-22 PROCEDURE — 49083 ABD PARACENTESIS W/IMAGING: CPT

## 2024-04-22 PROCEDURE — 36415 COLL VENOUS BLD VENIPUNCTURE: CPT | Performed by: EMERGENCY MEDICINE

## 2024-04-22 PROCEDURE — 84132 ASSAY OF SERUM POTASSIUM: CPT | Performed by: EMERGENCY MEDICINE

## 2024-04-22 PROCEDURE — P9047 ALBUMIN (HUMAN), 25%, 50ML: HCPCS | Mod: JZ,SE | Performed by: NURSE PRACTITIONER

## 2024-04-22 PROCEDURE — 80061 LIPID PANEL: CPT

## 2024-04-22 PROCEDURE — 71045 X-RAY EXAM CHEST 1 VIEW: CPT | Performed by: RADIOLOGY

## 2024-04-22 PROCEDURE — 83880 ASSAY OF NATRIURETIC PEPTIDE: CPT

## 2024-04-22 PROCEDURE — 99284 EMERGENCY DEPT VISIT MOD MDM: CPT | Performed by: EMERGENCY MEDICINE

## 2024-04-22 PROCEDURE — 83036 HEMOGLOBIN GLYCOSYLATED A1C: CPT

## 2024-04-22 PROCEDURE — 2720000007 HC OR 272 NO HCPCS

## 2024-04-22 PROCEDURE — 99283 EMERGENCY DEPT VISIT LOW MDM: CPT | Mod: 25 | Performed by: EMERGENCY MEDICINE

## 2024-04-22 RX ORDER — ALBUMIN HUMAN 250 G/1000ML
25 SOLUTION INTRAVENOUS ONCE
Status: COMPLETED | OUTPATIENT
Start: 2024-04-22 | End: 2024-04-22

## 2024-04-22 RX ORDER — FUROSEMIDE 40 MG/1
40 TABLET ORAL ONCE
Status: COMPLETED | OUTPATIENT
Start: 2024-04-22 | End: 2024-04-22

## 2024-04-22 RX ADMIN — FUROSEMIDE 40 MG: 40 TABLET ORAL at 05:25

## 2024-04-22 RX ADMIN — ALBUMIN HUMAN 25 G: 0.25 SOLUTION INTRAVENOUS at 09:30

## 2024-04-22 ASSESSMENT — PAIN SCALES - GENERAL
PAINLEVEL_OUTOF10: 0 - NO PAIN

## 2024-04-22 ASSESSMENT — PAIN - FUNCTIONAL ASSESSMENT
PAIN_FUNCTIONAL_ASSESSMENT: 0-10

## 2024-04-22 NOTE — ED TRIAGE NOTES
Pt to ED c/o shortness of breath x 20 minutes ago. Pt states she was arguing when it started. Pt denies any chest pain. Pt also endorsing bilateral leg swelling x 3 weeks. Pt has cirrhosis of liver & and is due to be drained tomm morning.

## 2024-04-22 NOTE — DISCHARGE INSTRUCTIONS
You should go to your appointment that is scheduled for 9 AM this morning for a paracentesis, this should help with the shortness of breath that you experienced.  Your workup today is reassuring.  Additionally I would talk to your primary care doctor at your appointment tomorrow in regards to starting you on Lasix for chronic management of your bilateral lower extremity swelling.    If your symptoms worsen, you have any chest pain, shortness of breath associated with exertion, or any other concerning symptoms you should present back to the emergency department.

## 2024-04-22 NOTE — ED PROVIDER NOTES
CC: Shortness of Breath     HPI:  Patient is a 64-year-old female with a past medical history of liver cirrhosis presenting to the emergency department today with a short period of shortness of breath.  She notes that she experiences shortness of breath like this episodically prior to her scheduled paracentesis.  Patient states she was in an argument earlier in the day and experienced some shortness of breath that resolved shortly after.  She also notes that she has been having bilateral leg swelling and pain that is been going on since 7 April.  Denies any pain in her legs, denies any hormonal medication use.  Denies any nausea, vomiting, chest pain, shortness of breath or abdominal pain.  States she was on Lasix previously which helped with the leg swelling however since she has been off those medications she has noticed that the swelling is worsened.  States that she has an appointment scheduled for paracentesis later this morning.  Also notes that she has a primary care appointment scheduled for the 23rd at 1 PM.  No other associate signs or symptoms reported at this time.    Limitations to History: none  Additional History provided by: N/A    External Records Reviewed:  Recent available ED and inpatient notes reviewed in EMR.    I reviewed the patient's ED visit from 03/07/2024.  Patient presented with worsening ascites at that time.    PMHx/PSHx:  Per HPI.   - has a past medical history of Alcohol abuse, Bipolar II disorder (Multi), Cirrhosis (Multi), Cirrhosis of liver (Multi), Cocaine abuse (Multi), Esophageal varices (Multi), Hepatitis C, Hydronephrosis, MDD (major depressive disorder), and Trichomonal vulvovaginitis (07/30/2022).  - has a past surgical history that includes Cholecystectomy (11/12/2014); US guided abdominal paracentesis (6/14/2023); US guided abdominal paracentesis (7/3/2023); US guided abdominal paracentesis (9/7/2023); US guided abdominal paracentesis (9/28/2023); US guided abdominal  paracentesis (10/19/2023); US guided abdominal paracentesis (11/9/2023); US guided abdominal paracentesis (11/30/2023); US guided abdominal paracentesis (12/21/2023); and US guided abdominal paracentesis (12/26/2023).    Medications:  Reviewed in EMR. See EMR for complete list of medications and doses.    Allergies:  Patient has no known allergies.    Social History:  - Tobacco:  reports that she has never smoked. She has never used smokeless tobacco.   - Alcohol:  reports current alcohol use.   - Illicit Drugs:  reports that she does not currently use drugs.     ROS:  Per HPI.     ???????????????????????????????????????????????????????????????  Triage Vitals:  T 37.4 °C (99.3 °F)  HR 92  BP 97/67  RR 18  O2 98 % None (Room air)    Physical Exam  Constitutional:       General: She is not in acute distress.     Appearance: Normal appearance. She is not toxic-appearing.   HENT:      Head: Normocephalic and atraumatic.      Mouth/Throat:      Mouth: Mucous membranes are moist.   Eyes:      General: No scleral icterus.     Conjunctiva/sclera: Conjunctivae normal.   Cardiovascular:      Rate and Rhythm: Normal rate and regular rhythm.      Pulses: Normal pulses.   Pulmonary:      Effort: Pulmonary effort is normal.      Breath sounds: Normal breath sounds.   Abdominal:      General: There is no distension.      Tenderness: There is no abdominal tenderness. There is no guarding.      Comments: Distended abdomen.  Nontender, no guarding.   Musculoskeletal:         General: Normal range of motion.      Cervical back: Normal range of motion and neck supple.      Right lower leg: Edema (2+ pitting edema up to the knees bilaterally.) present.      Left lower leg: Edema present.   Skin:     General: Skin is warm and dry.   Neurological:      General: No focal deficit present.      Mental Status: She is alert.   Psychiatric:         Mood and Affect: Mood normal.         Behavior: Behavior normal.         Thought Content:  Thought content normal.         Judgment: Judgment normal.       ???????????????????????????????????????????????????????????????  ED Course:  Diagnoses as of 04/22/24 0655   Shortness of breath   Cirrhosis of liver with ascites, unspecified hepatic cirrhosis type (Multi)   Edema of both legs       EKG & Images:  Independently reviewed, See ED Course      MDM:  -Patient is a 64-year-old female with the above past medical history who presented to the emergency department today with concerns for an episode of shortness of breath in addition to bilateral leg swelling.  On my examination the patient had abdominal distention with fluid wave consistent with ascites she has secondary to her alcoholic liver cirrhosis.  Additionally patient had bilateral 2-3+ pitting edema from the ankles up to her knees bilaterally.  Low concern for DVT, the patient has no significant pain, swelling asymmetry or additional risk factors for DVT.  Additionally low concern for SBP, patient has not been febrile, denying any significant pain, is scheduled to have a therapeutic paracentesis in the next 2 hours.  Patient was given a dose of Lasix here in the emergency department to help with the leg swelling additionally she has an appointment scheduled with her primary care doctor tomorrow at 1 PM.  I spoke to her about chronic management for swelling and she was agreeable with this.  Patient does not have a history of CHF, BNP normal.  Chest x-ray showed chronic changes however no concern for pulmonary edema.    Patient is discharged, is going to wait in the waiting room until her appointment at 9 AM.  Patient is agreeable with the plan.  She is discharged in stable condition.  I provided her with strict return precautions for which she is agreeable.    Final diagnoses:   [R06.02] Shortness of breath   [K74.60, R18.8] Cirrhosis of liver with ascites, unspecified hepatic cirrhosis type (Multi)   [R60.0] Edema of both legs         Social  "Determinants Limiting Care:      Disposition:  Discharge    Cheryl \"Jose\" Leonila  Emergency Medicine Resident, PGY1  [facility]   This patient was seen and staffed with Dr. Mendez    Disclaimer: This note was dictated by speech recognition. Minor errors in transcription may be present    Procedures ? SmartLinks last updated 4/22/2024 6:55 AM       ATTENDING ATTESTATION  64-year-old female with a history of cirrhosis, chronic ascites presenting to the emergency department with some exertional shortness of breath in the setting of increasing ascites.  She denies any fevers or chills no abdominal pain no falls or injuries no nausea vomiting or diarrhea.  Patient has stable saturation normal respiratory rate and effort.  She describes the symptoms as slightly worse when she lays flat improved with sitting upright.  She has a protuberant abdomen, ascites nontense, no tenderness, normal vital signs, with no clinical concern for SBP.  Patient actually has an appointment for therapeutic paracentesis today at 9:00, we provided her with IV diuretic for some symptomatic relief, no need for a diagnostic paracentesis given no clinical concern for SBP, I anticipate stable disposition to her appointment for her paracentesis    EKG reviewed independently by me and agree with interpretation above.    Urbano Mendez, [facility]    The patient was seen by the resident/fellow.  I have personally performed a substantive portion of the encounter.  I have seen and examined the patient; agree with the workup, evaluation, MDM, management and diagnosis.    I have reviewed all the nurses' notes and have confirmed their findings, and have incorporated those findings into this medical record.   The care plan has been discussed with the resident/fellow; I have reviewed the resident/fellow’s note and agree with the documented findings with the " exception/addition of information listed above.  On my own examination I agree and incorporated in this document my own history, examination findings and clinical decision making.  All notation in this Addendum supersedes information presented by the resident or CATARINO as listed above.        Cheryl Keen DO  Resident  04/22/24 0656

## 2024-04-22 NOTE — POST-PROCEDURE NOTE
INTERVENTIONAL RADIOLOGY ADVANCED PRACTICE PROCEDURE  [facility]    A time out was performed and Left Hemiabdomen was examined with US and appropriate entry point was confirmed and marked.   The patient was prepped and draped in a sterile manner, 1% lidocaine was used to anesthesize the skin and subcutaneous tissue.   A 5F Centesis needle was then introduced through the skin into the peritoneal space, the centesis catheter was then threaded without difficulty.   5300 ml of olga fluid was removed without difficulty. The catheter was then removed.   No immediate complications were noted during and immediately following the procedure.

## 2024-04-23 ENCOUNTER — OFFICE VISIT (OUTPATIENT)
Dept: PRIMARY CARE | Facility: HOSPITAL | Age: 64
End: 2024-04-23
Payer: COMMERCIAL

## 2024-04-23 ENCOUNTER — PHARMACY VISIT (OUTPATIENT)
Dept: PHARMACY | Facility: CLINIC | Age: 64
End: 2024-04-23
Payer: MEDICAID

## 2024-04-23 DIAGNOSIS — F31.81: ICD-10-CM

## 2024-04-23 DIAGNOSIS — E78.5 DYSLIPIDEMIA: ICD-10-CM

## 2024-04-23 DIAGNOSIS — K70.31 ALCOHOLIC CIRRHOSIS OF LIVER WITH ASCITES (MULTI): ICD-10-CM

## 2024-04-23 DIAGNOSIS — Z00.00 HEALTHCARE MAINTENANCE: Primary | ICD-10-CM

## 2024-04-23 DIAGNOSIS — F10.20 ALCOHOLISM (MULTI): ICD-10-CM

## 2024-04-23 LAB
ATRIAL RATE: 92 BPM
CHOLEST SERPL-MCNC: 167 MG/DL (ref 0–199)
CHOLESTEROL/HDL RATIO: 2.3
EST. AVERAGE GLUCOSE BLD GHB EST-MCNC: 91 MG/DL
HBA1C MFR BLD: 4.8 %
HDLC SERPL-MCNC: 72.9 MG/DL
LDLC SERPL CALC-MCNC: 68 MG/DL
NON HDL CHOLESTEROL: 94 MG/DL (ref 0–149)
P AXIS: 21 DEGREES
P OFFSET: 210 MS
P ONSET: 172 MS
PR INTERVAL: 104 MS
Q ONSET: 224 MS
QRS COUNT: 15 BEATS
QRS DURATION: 108 MS
QT INTERVAL: 360 MS
QTC CALCULATION(BAZETT): 445 MS
QTC FREDERICIA: 415 MS
R AXIS: -3 DEGREES
T AXIS: -19 DEGREES
T OFFSET: 404 MS
TRIGL SERPL-MCNC: 131 MG/DL (ref 0–149)
VENTRICULAR RATE: 92 BPM
VLDL: 26 MG/DL (ref 0–40)

## 2024-04-23 PROCEDURE — 99215 OFFICE O/P EST HI 40 MIN: CPT | Mod: GC

## 2024-04-23 PROCEDURE — 99215 OFFICE O/P EST HI 40 MIN: CPT

## 2024-04-23 PROCEDURE — RXMED WILLOW AMBULATORY MEDICATION CHARGE

## 2024-04-23 PROCEDURE — 3074F SYST BP LT 130 MM HG: CPT

## 2024-04-23 PROCEDURE — 90677 PCV20 VACCINE IM: CPT | Mod: GC

## 2024-04-23 PROCEDURE — 3078F DIAST BP <80 MM HG: CPT

## 2024-04-23 RX ORDER — NALTREXONE HYDROCHLORIDE 50 MG/1
50 TABLET, FILM COATED ORAL DAILY
Qty: 30 TABLET | Refills: 11 | Status: SHIPPED | OUTPATIENT
Start: 2024-04-23 | End: 2025-04-23

## 2024-04-23 RX ORDER — FUROSEMIDE 40 MG/1
40 TABLET ORAL DAILY
Qty: 30 TABLET | Refills: 0 | Status: SHIPPED | OUTPATIENT
Start: 2024-04-23 | End: 2024-05-23

## 2024-04-23 RX ORDER — ARIPIPRAZOLE 5 MG/1
5 TABLET ORAL DAILY
Qty: 30 TABLET | Refills: 11 | Status: SHIPPED | OUTPATIENT
Start: 2024-04-23 | End: 2025-04-23

## 2024-04-23 RX ORDER — LANOLIN ALCOHOL/MO/W.PET/CERES
100 CREAM (GRAM) TOPICAL DAILY
Qty: 30 TABLET | Refills: 0 | Status: SHIPPED | OUTPATIENT
Start: 2024-04-23 | End: 2025-04-23

## 2024-04-23 RX ORDER — ATORVASTATIN CALCIUM 20 MG/1
20 TABLET, FILM COATED ORAL NIGHTLY
Qty: 30 TABLET | Refills: 11 | Status: SHIPPED | OUTPATIENT
Start: 2024-04-23 | End: 2025-04-23

## 2024-04-23 RX ORDER — CARVEDILOL 3.12 MG/1
3.12 TABLET ORAL
Qty: 30 TABLET | Refills: 11 | Status: SHIPPED | OUTPATIENT
Start: 2024-04-23 | End: 2025-04-23

## 2024-04-23 RX ORDER — SPIRONOLACTONE 50 MG/1
50 TABLET, FILM COATED ORAL DAILY
Qty: 30 TABLET | Refills: 3 | Status: SHIPPED | OUTPATIENT
Start: 2024-04-23 | End: 2024-08-21

## 2024-04-23 ASSESSMENT — ENCOUNTER SYMPTOMS
LOSS OF SENSATION IN FEET: 0
OCCASIONAL FEELINGS OF UNSTEADINESS: 0
DEPRESSION: 0

## 2024-04-23 ASSESSMENT — PAIN SCALES - GENERAL: PAINLEVEL: 0-NO PAIN

## 2024-04-23 NOTE — PATIENT INSTRUCTIONS
Hi Ms Moreno,     Thank you for visiting us today. It was a pleasure seeing you. Here is a summary of what we discussed:     I refilled your medications.   2. I started you on naltrexone 50 mg daily. This will help reduce your cravings for alcohol.   3. Our main goals are to help you stay healthy. The main thing we need to focus on right now is helping support you while you're going through this tough time and help you get off of alcohol and to follow-up with the liver doctor (Dr Guillaume).  4. You got your pneumonia vaccine updated today, this should be your last pneumonia vaccine  5.  I ordered a mammogram for you    Please return to clinic In 3 months    Best,  Solomon Pink MD  [facility]    To schedule a specialty appointment, please call [phone].     If you have any questions or concerns, please contact the [facility] at [phone].   Fax: [phone]    Outpatient Follow-Up  Future Appointments   Date Time Provider Department Center   5/10/2024 10:00 AM [id number]
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
## 2024-04-24 VITALS
HEART RATE: 99 BPM | OXYGEN SATURATION: 99 % | SYSTOLIC BLOOD PRESSURE: 104 MMHG | TEMPERATURE: 99.1 F | HEIGHT: 63 IN | DIASTOLIC BLOOD PRESSURE: 69 MMHG | WEIGHT: 152 LBS | BODY MASS INDEX: 26.93 KG/M2

## 2024-04-25 LAB
BACTERIA FLD CULT: NORMAL
GRAM STN SPEC: NORMAL
GRAM STN SPEC: NORMAL

## 2024-05-06 ENCOUNTER — HOSPITAL ENCOUNTER (OUTPATIENT)
Dept: RADIOLOGY | Facility: HOSPITAL | Age: 64
Discharge: HOME | End: 2024-05-06
Payer: COMMERCIAL

## 2024-05-06 VITALS
HEART RATE: 91 BPM | RESPIRATION RATE: 16 BRPM | OXYGEN SATURATION: 100 % | TEMPERATURE: 97.7 F | DIASTOLIC BLOOD PRESSURE: 65 MMHG | SYSTOLIC BLOOD PRESSURE: 109 MMHG

## 2024-05-06 DIAGNOSIS — K70.31 ALCOHOLIC CIRRHOSIS OF LIVER WITH ASCITES (MULTI): ICD-10-CM

## 2024-05-06 PROCEDURE — 2500000004 HC RX 250 GENERAL PHARMACY W/ HCPCS (ALT 636 FOR OP/ED): Mod: JZ,SE | Performed by: NURSE PRACTITIONER

## 2024-05-06 PROCEDURE — P9047 ALBUMIN (HUMAN), 25%, 50ML: HCPCS | Mod: JZ,SE | Performed by: NURSE PRACTITIONER

## 2024-05-06 PROCEDURE — 49083 ABD PARACENTESIS W/IMAGING: CPT

## 2024-05-06 RX ORDER — ALBUMIN HUMAN 250 G/1000ML
50 SOLUTION INTRAVENOUS ONCE
Status: COMPLETED | OUTPATIENT
Start: 2024-05-06 | End: 2024-05-06

## 2024-05-06 RX ADMIN — ALBUMIN HUMAN 50 G: 0.25 SOLUTION INTRAVENOUS at 10:00

## 2024-05-06 NOTE — POST-PROCEDURE NOTE
INTERVENTIONAL RADIOLOGY ADVANCED PRACTICE PROCEDURE  [facility]    A time out was performed and Left Hemiabdomen was examined with US and appropriate entry point was confirmed and marked.   The patient was prepped and draped in a sterile manner, 1% lidocaine was used to anesthesize the skin and subcutaneous tissue.   A 5F Centesis needle was then introduced through the skin into the peritoneal space, the centesis catheter was then threaded without difficulty.   9100 ml of yellow fluid was removed without difficulty. The catheter was then removed.   No immediate complications were noted during and immediately following the procedure.

## 2024-05-10 ENCOUNTER — TELEPHONE (OUTPATIENT)
Dept: PRIMARY CARE | Facility: HOSPITAL | Age: 64
End: 2024-05-10

## 2024-05-10 DIAGNOSIS — Z59.41 FOOD INSECURITY: ICD-10-CM

## 2024-05-10 DIAGNOSIS — Z00.00 HEALTHCARE MAINTENANCE: Primary | ICD-10-CM

## 2024-05-10 SDOH — ECONOMIC STABILITY - FOOD INSECURITY: FOOD INSECURITY: Z59.41

## 2024-05-20 ENCOUNTER — HOSPITAL ENCOUNTER (OUTPATIENT)
Dept: RADIOLOGY | Facility: HOSPITAL | Age: 64
Discharge: HOME | End: 2024-05-20
Payer: COMMERCIAL

## 2024-05-20 VITALS
OXYGEN SATURATION: 98 % | RESPIRATION RATE: 16 BRPM | DIASTOLIC BLOOD PRESSURE: 85 MMHG | SYSTOLIC BLOOD PRESSURE: 110 MMHG | HEART RATE: 99 BPM

## 2024-05-20 DIAGNOSIS — K70.31 ALCOHOLIC CIRRHOSIS OF LIVER WITH ASCITES (MULTI): ICD-10-CM

## 2024-05-20 DIAGNOSIS — K70.9 ALCOHOLIC LIVER DISEASE (MULTI): Primary | ICD-10-CM

## 2024-05-20 PROCEDURE — 2500000004 HC RX 250 GENERAL PHARMACY W/ HCPCS (ALT 636 FOR OP/ED): Mod: JZ,SE | Performed by: NURSE PRACTITIONER

## 2024-05-20 PROCEDURE — 49083 ABD PARACENTESIS W/IMAGING: CPT | Performed by: NURSE PRACTITIONER

## 2024-05-20 PROCEDURE — P9047 ALBUMIN (HUMAN), 25%, 50ML: HCPCS | Mod: JZ,SE | Performed by: NURSE PRACTITIONER

## 2024-05-20 PROCEDURE — 49083 ABD PARACENTESIS W/IMAGING: CPT

## 2024-05-20 RX ORDER — ALBUMIN HUMAN 250 G/1000ML
50 SOLUTION INTRAVENOUS ONCE
Status: COMPLETED | OUTPATIENT
Start: 2024-05-20 | End: 2024-05-20

## 2024-05-20 RX ADMIN — ALBUMIN HUMAN 50 G: 0.25 SOLUTION INTRAVENOUS at 11:00

## 2024-05-20 NOTE — POST-PROCEDURE NOTE
INTERVENTIONAL RADIOLOGY ADVANCED PRACTICE PROCEDURE  [facility]    A time out was performed and Left Hemiabdomen was examined with US and appropriate entry point was confirmed and marked.   The patient was prepped and draped in a sterile manner, 1% lidocaine was used to anesthesize the skin and subcutaneous tissue.   A 5F Centesis needle was then introduced through the skin into the peritoneal space, the centesis catheter was then threaded without difficulty.   7800 ml of yellow fluid was removed without difficulty. The catheter was then removed.   No immediate complications were noted during and immediately following the procedure.

## 2024-05-21 ENCOUNTER — TELEPHONE (OUTPATIENT)
Dept: PRIMARY CARE | Facility: HOSPITAL | Age: 64
End: 2024-05-21
Payer: COMMERCIAL

## 2024-05-21 DIAGNOSIS — L30.9 ECZEMA, UNSPECIFIED TYPE: Primary | ICD-10-CM

## 2024-05-21 RX ORDER — HYDROCORTISONE 25 MG/G
CREAM TOPICAL 2 TIMES DAILY PRN
Qty: 30 G | Refills: 4 | Status: SHIPPED | OUTPATIENT
Start: 2024-05-21 | End: 2025-05-21

## 2024-05-21 NOTE — TELEPHONE ENCOUNTER
States that she has been itching on your legs, arms and chest. Has not noticed a rash, but states she has eczema and dry skin; hydrocortisone has worked for her in the past. Will order today.

## 2024-06-03 ENCOUNTER — HOSPITAL ENCOUNTER (OUTPATIENT)
Dept: RADIOLOGY | Facility: HOSPITAL | Age: 64
Discharge: HOME | End: 2024-06-03
Payer: COMMERCIAL

## 2024-06-03 DIAGNOSIS — K70.9 ALCOHOLIC LIVER DISEASE (MULTI): ICD-10-CM

## 2024-06-03 DIAGNOSIS — K70.31 ALCOHOLIC CIRRHOSIS OF LIVER WITH ASCITES (MULTI): ICD-10-CM

## 2024-06-03 DIAGNOSIS — F10.20 ALCOHOLISM (MULTI): Primary | ICD-10-CM

## 2024-06-03 PROCEDURE — P9047 ALBUMIN (HUMAN), 25%, 50ML: HCPCS | Mod: JZ,SE | Performed by: NURSE PRACTITIONER

## 2024-06-03 PROCEDURE — 49083 ABD PARACENTESIS W/IMAGING: CPT

## 2024-06-03 PROCEDURE — 76705 ECHO EXAM OF ABDOMEN: CPT | Performed by: NURSE PRACTITIONER

## 2024-06-03 PROCEDURE — 2500000004 HC RX 250 GENERAL PHARMACY W/ HCPCS (ALT 636 FOR OP/ED): Mod: JZ,SE | Performed by: NURSE PRACTITIONER

## 2024-06-03 RX ORDER — ALBUMIN HUMAN 250 G/1000ML
37.5 SOLUTION INTRAVENOUS ONCE
Status: COMPLETED | OUTPATIENT
Start: 2024-06-03 | End: 2024-06-03

## 2024-06-03 RX ADMIN — ALBUMIN HUMAN 37.5 G: 0.25 SOLUTION INTRAVENOUS at 11:00

## 2024-06-03 NOTE — POST-PROCEDURE NOTE
INTERVENTIONAL RADIOLOGY ADVANCED PRACTICE PROCEDURE  [facility]    A time out was performed and Right Hemiabdomen was examined with US and appropriate entry point was confirmed and marked.   The patient was prepped and draped in a sterile manner, 1% lidocaine was used to anesthesize the skin and subcutaneous tissue.   A 5F Centesis needle was then introduced through the skin into the peritoneal space, the centesis catheter was then threaded without difficulty.   6300 ml of yellow fluid was removed without difficulty. The catheter was then removed.   No immediate complications were noted during and immediately following the procedure.

## 2024-06-14 ENCOUNTER — CLINICAL SUPPORT (OUTPATIENT)
Dept: NUTRITION | Facility: HOSPITAL | Age: 64
End: 2024-06-14
Payer: COMMERCIAL

## 2024-06-14 DIAGNOSIS — Z59.41 FOOD INSECURITY: ICD-10-CM

## 2024-06-14 SDOH — ECONOMIC STABILITY - FOOD INSECURITY: FOOD INSECURITY: Z59.41

## 2024-06-14 NOTE — PROGRESS NOTES
[employer]  Diet Recommendation 1: Heart Healthy  Diet Recommendation 2: Protein, High  Diet Recommendation 3: Healthy Eating  Other Diet Recommendations: Ascites  Food Intolerance Avoidance: NKFA  Household Size: 3 Family Members  Interventions: Referral Number: 4th 6 Mo Referral 2 yrs (Referrals may not be consecutive)  Interventions: Visit Number: 1 of 6 Visits - Max 6 Visits/Referral Each 6 Mo Period  Education Today: MyPlate Meals  Grains: 0-25% Whole  Fruit: Fresh - 100%  Vegetables: 25-50% Fresh  Proteins: 0 Plant-based Items  Dairy: 25-50% Lowfat  Originating Site of Referral Order: [facility]  Initials of RD Assisting Today: SHARRI

## 2024-06-17 ENCOUNTER — HOSPITAL ENCOUNTER (OUTPATIENT)
Dept: RADIOLOGY | Facility: HOSPITAL | Age: 64
Discharge: HOME | End: 2024-06-17
Payer: COMMERCIAL

## 2024-06-17 VITALS
DIASTOLIC BLOOD PRESSURE: 68 MMHG | SYSTOLIC BLOOD PRESSURE: 112 MMHG | OXYGEN SATURATION: 96 % | HEART RATE: 91 BPM | RESPIRATION RATE: 17 BRPM

## 2024-06-17 DIAGNOSIS — K70.31 ALCOHOLIC CIRRHOSIS OF LIVER WITH ASCITES (MULTI): ICD-10-CM

## 2024-06-17 DIAGNOSIS — F10.20 ALCOHOLISM (MULTI): ICD-10-CM

## 2024-06-17 PROCEDURE — P9047 ALBUMIN (HUMAN), 25%, 50ML: HCPCS | Mod: JZ,SE | Performed by: PHYSICIAN ASSISTANT

## 2024-06-17 PROCEDURE — 49083 ABD PARACENTESIS W/IMAGING: CPT

## 2024-06-17 PROCEDURE — 7100000009 HC PHASE TWO TIME - INITIAL BASE CHARGE

## 2024-06-17 PROCEDURE — 2500000004 HC RX 250 GENERAL PHARMACY W/ HCPCS (ALT 636 FOR OP/ED): Mod: JZ,SE | Performed by: PHYSICIAN ASSISTANT

## 2024-06-17 PROCEDURE — 7100000010 HC PHASE TWO TIME - EACH INCREMENTAL 1 MINUTE

## 2024-06-17 RX ORDER — ALBUMIN HUMAN 250 G/1000ML
25 SOLUTION INTRAVENOUS ONCE
Status: COMPLETED | OUTPATIENT
Start: 2024-06-17 | End: 2024-06-17

## 2024-06-17 ASSESSMENT — PAIN - FUNCTIONAL ASSESSMENT: PAIN_FUNCTIONAL_ASSESSMENT: 0-10

## 2024-06-17 ASSESSMENT — PAIN SCALES - GENERAL: PAINLEVEL_OUTOF10: 0 - NO PAIN

## 2024-06-17 NOTE — POST-PROCEDURE NOTE
INTERVENTIONAL RADIOLOGY ADVANCED PRACTICE PROCEDURE  [facility]    A time out was performed and Left Hemiabdomen was examined with US and appropriate entry point was confirmed and marked.   The patient was prepped and draped in a sterile manner, 1% lidocaine was used to anesthesize the skin and subcutaneous tissue.   A 5F Centesis needle was then introduced through the skin into the peritoneal space, the centesis catheter was then threaded without difficulty.   5100 ml of yellow fluid was removed without difficulty. The catheter was then removed.   No immediate complications were noted during and immediately following the procedure.

## 2024-06-27 ENCOUNTER — HOSPITAL ENCOUNTER (EMERGENCY)
Facility: HOSPITAL | Age: 64
Discharge: HOME | End: 2024-06-27
Attending: STUDENT IN AN ORGANIZED HEALTH CARE EDUCATION/TRAINING PROGRAM
Payer: COMMERCIAL

## 2024-06-27 VITALS
RESPIRATION RATE: 16 BRPM | OXYGEN SATURATION: 99 % | TEMPERATURE: 97.3 F | DIASTOLIC BLOOD PRESSURE: 77 MMHG | HEIGHT: 66 IN | HEART RATE: 89 BPM | BODY MASS INDEX: 24.59 KG/M2 | SYSTOLIC BLOOD PRESSURE: 115 MMHG | WEIGHT: 153 LBS

## 2024-06-27 DIAGNOSIS — K64.4 EXTERNAL HEMORRHOID: Primary | ICD-10-CM

## 2024-06-27 PROCEDURE — 99284 EMERGENCY DEPT VISIT MOD MDM: CPT | Performed by: STUDENT IN AN ORGANIZED HEALTH CARE EDUCATION/TRAINING PROGRAM

## 2024-06-27 PROCEDURE — 99283 EMERGENCY DEPT VISIT LOW MDM: CPT | Performed by: STUDENT IN AN ORGANIZED HEALTH CARE EDUCATION/TRAINING PROGRAM

## 2024-06-27 RX ORDER — HYDROCORTISONE 1 %
1 CREAM (GRAM) TOPICAL 2 TIMES DAILY
Qty: 14 G | Refills: 0 | Status: SHIPPED | OUTPATIENT
Start: 2024-06-27 | End: 2024-07-11

## 2024-06-27 RX ORDER — POLYETHYLENE GLYCOL 3350 17 G/17G
17 POWDER, FOR SOLUTION ORAL DAILY
Qty: 238 G | Refills: 0 | Status: SHIPPED | OUTPATIENT
Start: 2024-06-27 | End: 2024-07-11

## 2024-06-27 ASSESSMENT — LIFESTYLE VARIABLES
HAVE YOU EVER FELT YOU SHOULD CUT DOWN ON YOUR DRINKING: NO
HAVE PEOPLE ANNOYED YOU BY CRITICIZING YOUR DRINKING: NO
TOTAL SCORE: 0
EVER HAD A DRINK FIRST THING IN THE MORNING TO STEADY YOUR NERVES TO GET RID OF A HANGOVER: NO
EVER FELT BAD OR GUILTY ABOUT YOUR DRINKING: NO

## 2024-06-27 ASSESSMENT — PAIN - FUNCTIONAL ASSESSMENT: PAIN_FUNCTIONAL_ASSESSMENT: 0-10

## 2024-06-27 ASSESSMENT — COLUMBIA-SUICIDE SEVERITY RATING SCALE - C-SSRS
6. HAVE YOU EVER DONE ANYTHING, STARTED TO DO ANYTHING, OR PREPARED TO DO ANYTHING TO END YOUR LIFE?: NO
2. HAVE YOU ACTUALLY HAD ANY THOUGHTS OF KILLING YOURSELF?: NO
1. IN THE PAST MONTH, HAVE YOU WISHED YOU WERE DEAD OR WISHED YOU COULD GO TO SLEEP AND NOT WAKE UP?: NO

## 2024-06-27 ASSESSMENT — PAIN SCALES - GENERAL: PAINLEVEL_OUTOF10: 2

## 2024-06-28 NOTE — DISCHARGE INSTRUCTIONS
You have been diagnosed with an external hemorrhoid here in the emergency department.  This typically causes pain with stooling and some mild bright red blood in in the toilet and on the toilet paper.  You have been given a prescription for hydrocortisone cream which you are instructed to apply the area 2 times per day for the next 14 days.  Please return to the emergency department if you begin her abdominal pain, shortness of breath, severe/crushing chest pain, uncontrolled fevers, dizziness/lightheadedness or passout.  You are scheduled for repeat paracentesis on 7/1 and are instructed to go to that appointment.  Please follow-up with your primary care provider within the next 7 days for reevaluation of your symptoms.

## 2024-06-28 NOTE — ED PROVIDER NOTES
CC: Blood in Urine and Black or Bloody Stool     History provided by: Patient  Limitations to History: None    HPI:  Yolie Moreno is a 64 y.o. female with past medical history of hepatitis C, decompensated alcoholic liver cirrhosis with ascites requiring paracentesis every 2 weeks, esophageal varices alcohol abuse disorder, and bipolar disorder that presents to the emergency department today for rectal bleeding.  The patient states that she had 1 episode of bright red blood per rectum occurring this afternoon which was associated with pain with defecation.  The patient states that she has never had anything like this before and denies any past history of hemorrhoids.  She denies any black/tarry stool.  She does feel some abdominal fullness but is scheduled for a repeat paracentesis on 7/1 as her last one was on 6/17.  She denies any other associated abdominal pain.  She denies any nausea, vomiting, hematemesis, chest pain, shortness of breath, dizziness/lightheadedness, or any other associated symptoms.    Records Reviewed: ED records from 4/22/2024 reviewed  ???????????????????????????????????????????????????????????????  Triage Vitals:  T 36.3 °C (97.3 °F)  HR 98  /75  RR 18  O2 98 % None (Room air)    Vital signs reviewed in nursing triage note, EMR flow sheets, and at patient's bedside.   General: Awake, alert, in no acute distress  Eyes: Gaze conjugate.  No scleral icterus or injection  HENT: Normo-cephalic, atraumatic. No stridor. No rhinorrhea or epistaxis.  CV: Regular rate and rhythm. No murmurs appreciated. Radial pulses 2+ bilaterally  Respiratory: Breathing non-labored, speaking in full sentences.  Lungs clear to auscultation bilaterally.  GI: Soft, non-tender to palpation.  Distended abdomen but not tense.  Fluid wave noted. No rebound or guarding.  Rectal exam: Nonthrombosed external hemorrhoid with tenderness to palpation and no active bleeding.  No other external masses or fissures  appreciated.  Digital rectal exam with no internal masses and brown stool present.   MSK/Extremities: No gross bony deformities. Moving all extremities.  1+ pitting edema bilaterally.  Skin: Warm. Appropriate color  Neuro: Alert. Oriented. Face symmetric. Speech is fluent.  Gross strength and sensation intact in b/l UE and LEs  Psych: Appropriate mood and affect   ???????????????????????????????????????????????????????????????  ED Course/Treatment/Medical Decision Making  MDM:  Yolie Moreno is a 64 y.o. female with the above-stated past medical history that presents to the emergency department for rectal bleeding and pain with defecation most consistent with external hemorrhoids.  Upon arrival to the emergency department the patient had stable vital signs and was afebrile.  She was nontoxic-appearing on physical exam with a distended but nontense abdomen.  Rectal exam was positive for a small nonthrombosed external hemorrhoid with no other masses and brown stool present.  Given the normal colored stool and benign abdominal exam there is low suspicion for upper GI bleed or worsening varices.  There is no indication for labs or further imaging at this time.  See ED course below for further evaluation    Differential diagnoses considered include but are not limited to: Hemorrhoids, diverticulitis, esophageal varices, upper GI bleed, lower GI bleed    ED Course:  ED Course as of 06/28/24 0929   Thu Jun 27, 2024   1050 Patient feels comfortable with discharge home at this time.  Provided with a prescription for hydrocortisone cream as well as MiraLAX.  She was instructed how to use these medications.  She was provided with strict return precautions including significant worsening of her bleeding, evidence of dark/tarry stools, severe/crushing chest pain, shortness of breath on exertion, significant dizziness/lightheadedness, syncopal episodes, signs of stroke or any other concerning symptoms.  The patient does have a  follow-up appointment for scheduled paracentesis on Monday which she is instructed to keep.  She was instructed to follow-up with her primary care provider for reevaluation of her symptoms within the next week.  The patient verbalized her understanding of the plan and she was discharged home in stable condition. [RS]      ED Course User Index  [RS] Ish Ospina DO         Diagnoses as of 06/28/24 0929   External hemorrhoid       Social Determinants Limiting Care:  None identified    Disposition:  Discharge home    Assessment and plan discussed with Dr. Susi Ospina DO   Emergency Medicine, PGY-1     Disclaimer: This note was dictated by speech recognition. Minor errors in transcription may be present.     Procedures ? SmartLinks last updated 6/28/2024 9:29 AM        Ish Ospina DO  Resident  06/28/24 0929

## 2024-07-01 ENCOUNTER — HOSPITAL ENCOUNTER (OUTPATIENT)
Dept: RADIOLOGY | Facility: HOSPITAL | Age: 64
Discharge: HOME | End: 2024-07-01
Payer: COMMERCIAL

## 2024-07-01 VITALS
HEART RATE: 86 BPM | SYSTOLIC BLOOD PRESSURE: 108 MMHG | OXYGEN SATURATION: 98 % | DIASTOLIC BLOOD PRESSURE: 62 MMHG | RESPIRATION RATE: 18 BRPM

## 2024-07-01 DIAGNOSIS — K70.31 ALCOHOLIC CIRRHOSIS OF LIVER WITH ASCITES (MULTI): ICD-10-CM

## 2024-07-01 PROCEDURE — P9047 ALBUMIN (HUMAN), 25%, 50ML: HCPCS | Mod: JZ,SE | Performed by: NURSE PRACTITIONER

## 2024-07-01 PROCEDURE — 2500000004 HC RX 250 GENERAL PHARMACY W/ HCPCS (ALT 636 FOR OP/ED): Mod: JZ,SE | Performed by: NURSE PRACTITIONER

## 2024-07-01 PROCEDURE — 49083 ABD PARACENTESIS W/IMAGING: CPT

## 2024-07-01 RX ORDER — ALBUMIN HUMAN 250 G/1000ML
12.5 SOLUTION INTRAVENOUS ONCE
Status: COMPLETED | OUTPATIENT
Start: 2024-07-01 | End: 2024-07-01

## 2024-07-01 NOTE — POST-PROCEDURE NOTE
INTERVENTIONAL RADIOLOGY ADVANCED PRACTICE PROCEDURE  [facility]    A time out was performed and Left Hemiabdomen was examined with US and appropriate entry point was confirmed and marked.   The patient was prepped and draped in a sterile manner, 1% lidocaine was used to anesthesize the skin and subcutaneous tissue.   A 5F Centesis needle was then introduced through the skin into the peritoneal space, the centesis catheter was then threaded without difficulty.   6700 ml of yellow fluid was removed without difficulty. The catheter was then removed.   No immediate complications were noted during and immediately following the procedure.

## 2024-07-15 ENCOUNTER — HOSPITAL ENCOUNTER (OUTPATIENT)
Dept: RADIOLOGY | Facility: HOSPITAL | Age: 64
Discharge: HOME | End: 2024-07-15
Payer: COMMERCIAL

## 2024-07-15 DIAGNOSIS — K70.31 ALCOHOLIC CIRRHOSIS OF LIVER WITH ASCITES (MULTI): ICD-10-CM

## 2024-07-15 PROCEDURE — 2500000004 HC RX 250 GENERAL PHARMACY W/ HCPCS (ALT 636 FOR OP/ED): Mod: JZ,SE | Performed by: NURSE PRACTITIONER

## 2024-07-15 PROCEDURE — 49083 ABD PARACENTESIS W/IMAGING: CPT

## 2024-07-15 PROCEDURE — P9047 ALBUMIN (HUMAN), 25%, 50ML: HCPCS | Mod: JZ,SE | Performed by: NURSE PRACTITIONER

## 2024-07-15 RX ORDER — ALBUMIN HUMAN 250 G/1000ML
25 SOLUTION INTRAVENOUS ONCE
Status: COMPLETED | OUTPATIENT
Start: 2024-07-15 | End: 2024-07-15

## 2024-07-15 NOTE — POST-PROCEDURE NOTE
INTERVENTIONAL RADIOLOGY ADVANCED PRACTICE PROCEDURE  [facility]    A time out was performed and Left Hemiabdomen was examined with US and appropriate entry point was confirmed and marked.   The patient was prepped and draped in a sterile manner, 1% lidocaine was used to anesthesize the skin and subcutaneous tissue.   A 5F Centesis needle was then introduced through the skin into the peritoneal space, the centesis catheter was then threaded without difficulty.   6300 ml of yellow fluid was removed without difficulty. The catheter was then removed.   No immediate complications were noted during and immediately following the procedure.

## 2024-07-17 ENCOUNTER — CLINICAL SUPPORT (OUTPATIENT)
Dept: NUTRITION | Facility: HOSPITAL | Age: 64
End: 2024-07-17
Payer: COMMERCIAL

## 2024-07-17 NOTE — PROGRESS NOTES
[employer]  Diet Recommendation 1: Heart Healthy  Diet Recommendation 2: Protein, High  Diet Recommendation 3: Healthy Eating  Other Diet Recommendations: Ascites  Food Intolerance Avoidance: NKFA  Household Size: 3 Family Members  Interventions: Referral Number: 4th 6 Mo Referral 2 yrs (Referrals may not be consecutive)  Interventions: Visit Number: 2 of 6 Visits - Max 6 Visits/Referral Each 6 Mo Period  Education Today: My[employer] Meals  Follow Up Notes for Future Visits: Paracentesis q 2 wks  Grains: 0-25% Whole  Fruit: 50-75% Fresh  Vegetables: 25-50% Fresh  Proteins: 0 Plant-based Items  Dairy: 25-50% Lowfat  [facility]  Initials of RD Assisting Today: SHARRI

## 2024-07-29 ENCOUNTER — HOSPITAL ENCOUNTER (OUTPATIENT)
Dept: RADIOLOGY | Facility: HOSPITAL | Age: 64
Discharge: HOME | End: 2024-07-29
Payer: COMMERCIAL

## 2024-07-29 VITALS
OXYGEN SATURATION: 100 % | RESPIRATION RATE: 18 BRPM | SYSTOLIC BLOOD PRESSURE: 107 MMHG | TEMPERATURE: 97.4 F | HEART RATE: 81 BPM | DIASTOLIC BLOOD PRESSURE: 76 MMHG

## 2024-07-29 DIAGNOSIS — K70.31 ALCOHOLIC CIRRHOSIS OF LIVER WITH ASCITES (MULTI): ICD-10-CM

## 2024-07-29 PROCEDURE — 2500000004 HC RX 250 GENERAL PHARMACY W/ HCPCS (ALT 636 FOR OP/ED): Mod: JZ,SE | Performed by: NURSE PRACTITIONER

## 2024-07-29 PROCEDURE — 49083 ABD PARACENTESIS W/IMAGING: CPT

## 2024-07-29 PROCEDURE — P9047 ALBUMIN (HUMAN), 25%, 50ML: HCPCS | Mod: JZ,SE | Performed by: NURSE PRACTITIONER

## 2024-07-29 RX ORDER — ALBUMIN HUMAN 250 G/1000ML
25 SOLUTION INTRAVENOUS ONCE
Status: COMPLETED | OUTPATIENT
Start: 2024-07-29 | End: 2024-07-29

## 2024-07-29 NOTE — POST-PROCEDURE NOTE
INTERVENTIONAL RADIOLOGY ADVANCED PRACTICE PROCEDURE  [facility]    A time out was performed and Right Hemiabdomen was examined with US and appropriate entry point was confirmed and marked.   The patient was prepped and draped in a sterile manner, 1% lidocaine was used to anesthesize the skin and subcutaneous tissue.   A 5F Centesis needle was then introduced through the skin into the peritoneal space, the centesis catheter was then threaded without difficulty.   4900 ml of yellow fluid was removed without difficulty. The catheter was then removed.   No immediate complications were noted during and immediately following the procedure.

## 2024-07-31 ENCOUNTER — APPOINTMENT (OUTPATIENT)
Dept: GASTROENTEROLOGY | Facility: HOSPITAL | Age: 64
End: 2024-07-31
Payer: COMMERCIAL

## 2024-08-12 ENCOUNTER — HOSPITAL ENCOUNTER (OUTPATIENT)
Dept: RADIOLOGY | Facility: HOSPITAL | Age: 64
Discharge: HOME | End: 2024-08-12
Payer: COMMERCIAL

## 2024-08-12 VITALS
OXYGEN SATURATION: 99 % | DIASTOLIC BLOOD PRESSURE: 65 MMHG | RESPIRATION RATE: 16 BRPM | TEMPERATURE: 98.6 F | SYSTOLIC BLOOD PRESSURE: 119 MMHG | HEART RATE: 110 BPM

## 2024-08-12 DIAGNOSIS — K70.31 ALCOHOLIC CIRRHOSIS OF LIVER WITH ASCITES (MULTI): ICD-10-CM

## 2024-08-12 PROCEDURE — P9047 ALBUMIN (HUMAN), 25%, 50ML: HCPCS | Mod: JZ,SE | Performed by: PHYSICIAN ASSISTANT

## 2024-08-12 PROCEDURE — 2500000004 HC RX 250 GENERAL PHARMACY W/ HCPCS (ALT 636 FOR OP/ED): Mod: JZ,SE | Performed by: PHYSICIAN ASSISTANT

## 2024-08-12 PROCEDURE — 49083 ABD PARACENTESIS W/IMAGING: CPT

## 2024-08-12 RX ORDER — ALBUMIN HUMAN 250 G/1000ML
50 SOLUTION INTRAVENOUS ONCE
Status: COMPLETED | OUTPATIENT
Start: 2024-08-12 | End: 2024-08-12

## 2024-08-12 ASSESSMENT — ENCOUNTER SYMPTOMS
RESPIRATORY NEGATIVE: 1
NEUROLOGICAL NEGATIVE: 1
ABDOMINAL DISTENTION: 1
PSYCHIATRIC NEGATIVE: 1
CONSTITUTIONAL NEGATIVE: 1
ABDOMINAL PAIN: 0
MUSCULOSKELETAL NEGATIVE: 1

## 2024-08-12 NOTE — H&P
History Of Present Illness  Yolie Moreno is a 64 y.o. female presenting with abdominal distension.  Patient with history of alcoholic cirrhosis with ascites.  She undergoes therapeutic paracentesis every 2 weeks.  She presents today for planned paracentesis.     Past Medical History  Past Medical History:   Diagnosis Date    Alcohol abuse     Bipolar II disorder (Multi)     Cirrhosis (Multi)     Cirrhosis of liver (Multi)     Cocaine abuse (Multi)     Esophageal varices (Multi)     Hepatitis C     Hydronephrosis     MDD (major depressive disorder)     Trichomonal vulvovaginitis 07/30/2022    Trichomonas vaginitis       Surgical History  Past Surgical History:   Procedure Laterality Date    CHOLECYSTECTOMY  11/12/2014    Cholecystectomy    US GUIDED ABDOMINAL PARACENTESIS  6/14/2023    US GUIDED ABDOMINAL PARACENTESIS 6/14/2023 [facility]    US GUIDED ABDOMINAL PARACENTESIS  7/3/2023    US GUIDED ABDOMINAL PARACENTESIS 7/3/2023 [facility]    US GUIDED ABDOMINAL PARACENTESIS  9/7/2023    US GUIDED ABDOMINAL PARACENTESIS 9/7/2023 [facility] US    US GUIDED ABDOMINAL PARACENTESIS  9/28/2023    US GUIDED ABDOMINAL PARACENTESIS 9/28/2023 [facility]    US GUIDED ABDOMINAL PARACENTESIS  10/19/2023    US GUIDED ABDOMINAL PARACENTESIS 10/19/2023 [facility]    US GUIDED ABDOMINAL PARACENTESIS  11/9/2023    US GUIDED ABDOMINAL PARACENTESIS 11/9/2023 Carlos Chan APRN-CNP [facility] US    US GUIDED ABDOMINAL PARACENTESIS  11/30/2023    US GUIDED ABDOMINAL PARACENTESIS 11/30/2023 [facility] US    US GUIDED ABDOMINAL PARACENTESIS  12/21/2023    US GUIDED ABDOMINAL PARACENTESIS 12/21/2023 [facility] US    US GUIDED ABDOMINAL PARACENTESIS  12/26/2023    US GUIDED ABDOMINAL PARACENTESIS 12/26/2023 Carlos Chan APRN-CNP [facility] US        Social History  She reports that she has never smoked. She has never used smokeless tobacco. She reports current alcohol use. She reports that she does not currently use drugs.    Family History  Family History   Problem Relation  Name Age of Onset    Diabetes type II Father          Allergies  Patient has no known allergies.    Review of Systems   Constitutional: Negative.    HENT: Negative.     Respiratory: Negative.     Cardiovascular:  Positive for leg swelling.   Gastrointestinal:  Positive for abdominal distention. Negative for abdominal pain.   Genitourinary: Negative.    Musculoskeletal: Negative.    Neurological: Negative.    Psychiatric/Behavioral: Negative.     All other systems reviewed and are negative.       Physical Exam  Vitals reviewed.   Constitutional:       General: She is not in acute distress.     Appearance: Normal appearance.   HENT:      Head: Normocephalic.      Mouth/Throat:      Mouth: Mucous membranes are moist.   Cardiovascular:      Rate and Rhythm: Tachycardia present.   Pulmonary:      Effort: Pulmonary effort is normal. No respiratory distress.      Comments: On room air  Abdominal:      General: There is distension.      Tenderness: There is no abdominal tenderness. There is no guarding.   Musculoskeletal:      Cervical back: Neck supple.      Right lower leg: Edema present.      Left lower leg: Edema present.   Skin:     General: Skin is warm and dry.      Coloration: Skin is not jaundiced.   Neurological:      General: No focal deficit present.      Mental Status: She is alert and oriented to person, place, and time. Mental status is at baseline.   Psychiatric:         Mood and Affect: Mood normal.         Behavior: Behavior normal.          Last Recorded Vitals  Blood pressure 133/87, pulse (!) 118, temperature 37 °C (98.6 °F), temperature source Temporal, resp. rate 18, SpO2 99%.    Relevant Results             Assessment/Plan   Active Problems:  There are no active Hospital Problems.      Yolie Moreno is a 64 y.o. female presenting with abdominal distension.  Patient with history of alcoholic cirrhosis with ascites.  She undergoes therapeutic paracentesis every 2 weeks.  She presents today for  planned paracentesis.    Uneventful paracentesis completed today, please see post-procedure note.       I spent 80 minutes in the professional and overall care of this patient.      Ale Dasilva PA-C

## 2024-08-12 NOTE — POST-PROCEDURE NOTE
INTERVENTIONAL RADIOLOGY ADVANCED PRACTICE PROCEDURE  [facility]    A time out was performed and Right Hemiabdomen was examined with US and appropriate entry point was confirmed and marked.   The patient was prepped and draped in a sterile manner, 1% lidocaine was used to anesthesize the skin and subcutaneous tissue.   A 5F Centesis needle was then introduced through the skin into the peritoneal space, the centesis catheter was then threaded without difficulty.   7200 ml of yellow fluid was removed without difficulty. The catheter was then removed.   No immediate complications were noted during and immediately following the procedure.

## 2024-08-13 ENCOUNTER — CLINICAL SUPPORT (OUTPATIENT)
Dept: NUTRITION | Facility: HOSPITAL | Age: 64
End: 2024-08-13
Payer: COMMERCIAL

## 2024-08-13 NOTE — PROGRESS NOTES
[employer] For [employer]  Diet Recommendation 1: Heart Healthy  Diet Recommendation 2: Protein, High  Diet Recommendation 3: Healthy Eating  Other Diet Recommendations: Ascites  Food Intolerance Avoidance: NKFA  Household Size: 3 Family Members  Interventions: Referral Number: 4th 6 Mo Referral 2 yrs (Referrals may not be consecutive)  Interventions: Visit Number: 3 of 6 Visits - Max 6 Visits/Referral Each 6 Mo Period  Education Today: My[employer] Meals  Follow Up Notes for Future Visits: Lost power in outage but thankfully just for 4 hours.  Fruit: 0-25% Fresh  Proteins: 0 Plant-based Items  Dairy: Lowfat - 100%  [facility]  Initials of RD Assisting Today: SHARRI

## 2024-08-26 ENCOUNTER — HOSPITAL ENCOUNTER (OUTPATIENT)
Dept: RADIOLOGY | Facility: HOSPITAL | Age: 64
Discharge: HOME | End: 2024-08-26
Payer: COMMERCIAL

## 2024-08-26 DIAGNOSIS — K70.31 ALCOHOLIC CIRRHOSIS OF LIVER WITH ASCITES (MULTI): ICD-10-CM

## 2024-08-26 PROCEDURE — 49083 ABD PARACENTESIS W/IMAGING: CPT

## 2024-08-26 PROCEDURE — P9047 ALBUMIN (HUMAN), 25%, 50ML: HCPCS | Mod: JZ,SE | Performed by: NURSE PRACTITIONER

## 2024-08-26 PROCEDURE — 2500000004 HC RX 250 GENERAL PHARMACY W/ HCPCS (ALT 636 FOR OP/ED): Mod: JZ,SE | Performed by: NURSE PRACTITIONER

## 2024-08-26 RX ORDER — ALBUMIN HUMAN 250 G/1000ML
37.5 SOLUTION INTRAVENOUS ONCE
Status: COMPLETED | OUTPATIENT
Start: 2024-08-26 | End: 2024-08-26

## 2024-08-26 NOTE — POST-PROCEDURE NOTE
INTERVENTIONAL RADIOLOGY ADVANCED PRACTICE PROCEDURE  [facility]    A time out was performed and Right Hemiabdomen was examined with US and appropriate entry point was confirmed and marked.   The patient was prepped and draped in a sterile manner, 1% lidocaine was used to anesthesize the skin and subcutaneous tissue.   A 5F Centesis needle was then introduced through the skin into the peritoneal space, the centesis catheter was then threaded without difficulty.   6700 ml of yellow fluid was removed without difficulty. The catheter was then removed.   No immediate complications were noted during and immediately following the procedure.

## 2024-08-28 ENCOUNTER — APPOINTMENT (OUTPATIENT)
Dept: GASTROENTEROLOGY | Facility: HOSPITAL | Age: 64
End: 2024-08-28
Payer: COMMERCIAL

## 2024-09-09 ENCOUNTER — CLINICAL SUPPORT (OUTPATIENT)
Dept: NUTRITION | Facility: HOSPITAL | Age: 64
End: 2024-09-09
Payer: COMMERCIAL

## 2024-09-09 ENCOUNTER — HOSPITAL ENCOUNTER (OUTPATIENT)
Dept: RADIOLOGY | Facility: HOSPITAL | Age: 64
Discharge: HOME | End: 2024-09-09
Payer: COMMERCIAL

## 2024-09-09 VITALS
TEMPERATURE: 98.1 F | OXYGEN SATURATION: 98 % | RESPIRATION RATE: 17 BRPM | SYSTOLIC BLOOD PRESSURE: 126 MMHG | DIASTOLIC BLOOD PRESSURE: 72 MMHG | HEART RATE: 97 BPM

## 2024-09-09 DIAGNOSIS — K70.31 ALCOHOLIC CIRRHOSIS OF LIVER WITH ASCITES (MULTI): ICD-10-CM

## 2024-09-09 PROCEDURE — P9047 ALBUMIN (HUMAN), 25%, 50ML: HCPCS | Mod: JZ,SE | Performed by: PHYSICIAN ASSISTANT

## 2024-09-09 PROCEDURE — 2500000004 HC RX 250 GENERAL PHARMACY W/ HCPCS (ALT 636 FOR OP/ED): Mod: JZ,SE | Performed by: PHYSICIAN ASSISTANT

## 2024-09-09 PROCEDURE — 49083 ABD PARACENTESIS W/IMAGING: CPT

## 2024-09-09 RX ORDER — ALBUMIN HUMAN 250 G/1000ML
50 SOLUTION INTRAVENOUS ONCE
Status: COMPLETED | OUTPATIENT
Start: 2024-09-09 | End: 2024-09-09

## 2024-09-09 NOTE — PROGRESS NOTES
[employer]  Diet Recommendation 1: Heart Healthy  Diet Recommendation 2: Protein, High  Diet Recommendation 3: Healthy Eating  Other Diet Recommendations: Ascites  Food Intolerance Avoidance: NKFA  Household Size: 3 Family Members  Interventions: Referral Number: 4th 6 Mo Referral 2 yrs (Referrals may not be consecutive)  Interventions: Visit Number: 4 of 6 Visits - Max 6 Visits/Referral Each 6 Mo Period  Education Today: My[employer] Meals  Follow Up Notes for Future Visits: Trying ground chicken for first time, gave ideas how to use  Grains: 0-25% Whole  Vegetables: Fresh - 100%  Proteins: 0 Plant-based Items  Dairy: Lowfat - 100%  Originating Site of Referral Order: [facility]  Initials of RD Assisting Today: SHARRI

## 2024-09-09 NOTE — POST-PROCEDURE NOTE
INTERVENTIONAL RADIOLOGY ADVANCED PRACTICE PROCEDURE  [facility]    A time out was performed and Right Hemiabdomen was examined with US and appropriate entry point was confirmed and marked.   The patient was prepped and draped in a sterile manner, 1% lidocaine was used to anesthesize the skin and subcutaneous tissue.   A 5F Centesis needle was then introduced through the skin into the peritoneal space, the centesis catheter was then threaded without difficulty.   8600 ml of yellow fluid was removed without difficulty. The catheter was then removed.   No immediate complications were noted during and immediately following the procedure.

## 2024-09-10 ENCOUNTER — APPOINTMENT (OUTPATIENT)
Dept: NUTRITION | Facility: HOSPITAL | Age: 64
End: 2024-09-10
Payer: COMMERCIAL

## 2024-09-23 ENCOUNTER — HOSPITAL ENCOUNTER (OUTPATIENT)
Dept: RADIOLOGY | Facility: HOSPITAL | Age: 64
Discharge: HOME | End: 2024-09-23
Payer: COMMERCIAL

## 2024-09-23 VITALS
DIASTOLIC BLOOD PRESSURE: 69 MMHG | TEMPERATURE: 97.8 F | OXYGEN SATURATION: 100 % | HEART RATE: 95 BPM | SYSTOLIC BLOOD PRESSURE: 110 MMHG | RESPIRATION RATE: 16 BRPM

## 2024-09-23 DIAGNOSIS — K70.31 ALCOHOLIC CIRRHOSIS OF LIVER WITH ASCITES (MULTI): ICD-10-CM

## 2024-09-23 PROCEDURE — 2500000004 HC RX 250 GENERAL PHARMACY W/ HCPCS (ALT 636 FOR OP/ED): Mod: JZ,SE | Performed by: PHYSICIAN ASSISTANT

## 2024-09-23 PROCEDURE — P9047 ALBUMIN (HUMAN), 25%, 50ML: HCPCS | Mod: JZ,SE | Performed by: PHYSICIAN ASSISTANT

## 2024-09-23 PROCEDURE — 49083 ABD PARACENTESIS W/IMAGING: CPT | Performed by: PHYSICIAN ASSISTANT

## 2024-09-23 PROCEDURE — 49083 ABD PARACENTESIS W/IMAGING: CPT

## 2024-09-23 RX ORDER — ALBUMIN HUMAN 250 G/1000ML
50 SOLUTION INTRAVENOUS ONCE
Status: COMPLETED | OUTPATIENT
Start: 2024-09-23 | End: 2024-09-23

## 2024-09-23 NOTE — POST-PROCEDURE NOTE
INTERVENTIONAL RADIOLOGY ADVANCED PRACTICE PROCEDURE  [facility]    A time out was performed and Right Hemiabdomen was examined with US and appropriate entry point was confirmed and marked.   The patient was prepped and draped in a sterile manner, 1% lidocaine was used to anesthesize the skin and subcutaneous tissue.   A 5F Centesis needle was then introduced through the skin into the peritoneal space, the centesis catheter was then threaded without difficulty.   7600 ml of yellow fluid was removed without difficulty. The catheter was then removed.   No immediate complications were noted during and immediately following the procedure.

## 2024-10-07 ENCOUNTER — HOSPITAL ENCOUNTER (OUTPATIENT)
Dept: RADIOLOGY | Facility: HOSPITAL | Age: 64
Discharge: HOME | End: 2024-10-07
Payer: COMMERCIAL

## 2024-10-07 VITALS
TEMPERATURE: 98.4 F | SYSTOLIC BLOOD PRESSURE: 112 MMHG | RESPIRATION RATE: 14 BRPM | OXYGEN SATURATION: 99 % | HEART RATE: 99 BPM | DIASTOLIC BLOOD PRESSURE: 69 MMHG

## 2024-10-07 DIAGNOSIS — K70.31 ALCOHOLIC CIRRHOSIS OF LIVER WITH ASCITES (MULTI): ICD-10-CM

## 2024-10-07 PROCEDURE — 49083 ABD PARACENTESIS W/IMAGING: CPT

## 2024-10-07 PROCEDURE — P9047 ALBUMIN (HUMAN), 25%, 50ML: HCPCS | Mod: JZ,SE | Performed by: PHYSICIAN ASSISTANT

## 2024-10-07 PROCEDURE — 76705 ECHO EXAM OF ABDOMEN: CPT | Performed by: PHYSICIAN ASSISTANT

## 2024-10-07 PROCEDURE — 2500000004 HC RX 250 GENERAL PHARMACY W/ HCPCS (ALT 636 FOR OP/ED): Mod: JZ,SE | Performed by: PHYSICIAN ASSISTANT

## 2024-10-07 RX ORDER — ALBUMIN HUMAN 250 G/1000ML
37.5 SOLUTION INTRAVENOUS ONCE
OUTPATIENT
Start: 2024-10-07 | End: 2024-10-07

## 2024-10-07 ASSESSMENT — PAIN SCALES - GENERAL
PAINLEVEL_OUTOF10: 0 - NO PAIN
PAINLEVEL_OUTOF10: 0 - NO PAIN

## 2024-10-07 ASSESSMENT — PAIN - FUNCTIONAL ASSESSMENT: PAIN_FUNCTIONAL_ASSESSMENT: 0-10

## 2024-10-07 NOTE — POST-PROCEDURE NOTE
INTERVENTIONAL RADIOLOGY ADVANCED PRACTICE PROCEDURE  [facility]    A time out was performed and Right Hemiabdomen was examined with US and appropriate entry point was confirmed and marked.   The patient was prepped and draped in a sterile manner, 1% lidocaine was used to anesthesize the skin and subcutaneous tissue.   A 5F Centesis needle was then introduced through the skin into the peritoneal space, the centesis catheter was then threaded without difficulty.   6600 ml of yellow fluid was removed without difficulty. The catheter was then removed.   No immediate complications were noted during and immediately following the procedure.

## 2024-10-20 ENCOUNTER — CLINICAL SUPPORT (OUTPATIENT)
Dept: EMERGENCY MEDICINE | Facility: HOSPITAL | Age: 64
End: 2024-10-20
Payer: COMMERCIAL

## 2024-10-20 ENCOUNTER — HOSPITAL ENCOUNTER (EMERGENCY)
Facility: HOSPITAL | Age: 64
Discharge: HOME | End: 2024-10-20
Attending: EMERGENCY MEDICINE
Payer: COMMERCIAL

## 2024-10-20 ENCOUNTER — APPOINTMENT (OUTPATIENT)
Dept: RADIOLOGY | Facility: HOSPITAL | Age: 64
End: 2024-10-20
Payer: COMMERCIAL

## 2024-10-20 VITALS
SYSTOLIC BLOOD PRESSURE: 110 MMHG | DIASTOLIC BLOOD PRESSURE: 74 MMHG | WEIGHT: 155 LBS | BODY MASS INDEX: 25.02 KG/M2 | HEART RATE: 90 BPM | TEMPERATURE: 98.9 F | RESPIRATION RATE: 16 BRPM | OXYGEN SATURATION: 100 %

## 2024-10-20 DIAGNOSIS — M62.830 LUMBAR PARASPINAL MUSCLE SPASM: ICD-10-CM

## 2024-10-20 DIAGNOSIS — E78.5 DYSLIPIDEMIA: ICD-10-CM

## 2024-10-20 DIAGNOSIS — K70.31 ALCOHOLIC CIRRHOSIS OF LIVER WITH ASCITES (MULTI): ICD-10-CM

## 2024-10-20 DIAGNOSIS — F31.81: ICD-10-CM

## 2024-10-20 DIAGNOSIS — G89.29 CHRONIC LEFT-SIDED LOW BACK PAIN WITHOUT SCIATICA: Primary | ICD-10-CM

## 2024-10-20 DIAGNOSIS — M54.50 CHRONIC LEFT-SIDED LOW BACK PAIN WITHOUT SCIATICA: Primary | ICD-10-CM

## 2024-10-20 DIAGNOSIS — F10.20 ALCOHOLISM (MULTI): ICD-10-CM

## 2024-10-20 LAB
ALBUMIN SERPL BCP-MCNC: 3 G/DL (ref 3.4–5)
ALP SERPL-CCNC: 121 U/L (ref 33–136)
ALT SERPL W P-5'-P-CCNC: 53 U/L (ref 7–45)
ANION GAP SERPL CALC-SCNC: 11 MMOL/L (ref 10–20)
AST SERPL W P-5'-P-CCNC: 94 U/L (ref 9–39)
BASOPHILS # BLD AUTO: 0.08 X10*3/UL (ref 0–0.1)
BASOPHILS NFR BLD AUTO: 1.3 %
BILIRUB SERPL-MCNC: 2.7 MG/DL (ref 0–1.2)
BUN SERPL-MCNC: 20 MG/DL (ref 6–23)
CALCIUM SERPL-MCNC: 8.9 MG/DL (ref 8.6–10.6)
CHLORIDE SERPL-SCNC: 104 MMOL/L (ref 98–107)
CO2 SERPL-SCNC: 25 MMOL/L (ref 21–32)
CREAT SERPL-MCNC: 0.79 MG/DL (ref 0.5–1.05)
EGFRCR SERPLBLD CKD-EPI 2021: 85 ML/MIN/1.73M*2
EOSINOPHIL # BLD AUTO: 0.12 X10*3/UL (ref 0–0.7)
EOSINOPHIL NFR BLD AUTO: 1.9 %
ERYTHROCYTE [DISTWIDTH] IN BLOOD BY AUTOMATED COUNT: 17.1 % (ref 11.5–14.5)
GLUCOSE SERPL-MCNC: 101 MG/DL (ref 74–99)
HCT VFR BLD AUTO: 34.9 % (ref 36–46)
HGB BLD-MCNC: 10.8 G/DL (ref 12–16)
IMM GRANULOCYTES # BLD AUTO: 0.02 X10*3/UL (ref 0–0.7)
IMM GRANULOCYTES NFR BLD AUTO: 0.3 % (ref 0–0.9)
LYMPHOCYTES # BLD AUTO: 0.88 X10*3/UL (ref 1.2–4.8)
LYMPHOCYTES NFR BLD AUTO: 14.1 %
MCH RBC QN AUTO: 27.9 PG (ref 26–34)
MCHC RBC AUTO-ENTMCNC: 30.9 G/DL (ref 32–36)
MCV RBC AUTO: 90 FL (ref 80–100)
MONOCYTES # BLD AUTO: 1.7 X10*3/UL (ref 0.1–1)
MONOCYTES NFR BLD AUTO: 27.2 %
NEUTROPHILS # BLD AUTO: 3.46 X10*3/UL (ref 1.2–7.7)
NEUTROPHILS NFR BLD AUTO: 55.2 %
NRBC BLD-RTO: 0 /100 WBCS (ref 0–0)
PLATELET # BLD AUTO: 79 X10*3/UL (ref 150–450)
POTASSIUM SERPL-SCNC: 4.9 MMOL/L (ref 3.5–5.3)
PROT SERPL-MCNC: 7.1 G/DL (ref 6.4–8.2)
RBC # BLD AUTO: 3.87 X10*6/UL (ref 4–5.2)
SODIUM SERPL-SCNC: 135 MMOL/L (ref 136–145)
WBC # BLD AUTO: 6.3 X10*3/UL (ref 4.4–11.3)

## 2024-10-20 PROCEDURE — 85025 COMPLETE CBC W/AUTO DIFF WBC: CPT

## 2024-10-20 PROCEDURE — 71046 X-RAY EXAM CHEST 2 VIEWS: CPT | Performed by: SURGERY

## 2024-10-20 PROCEDURE — 2500000004 HC RX 250 GENERAL PHARMACY W/ HCPCS (ALT 636 FOR OP/ED): Mod: SE

## 2024-10-20 PROCEDURE — 93005 ELECTROCARDIOGRAM TRACING: CPT | Mod: 59

## 2024-10-20 PROCEDURE — 2500000001 HC RX 250 WO HCPCS SELF ADMINISTERED DRUGS (ALT 637 FOR MEDICARE OP): Mod: SE

## 2024-10-20 PROCEDURE — 99283 EMERGENCY DEPT VISIT LOW MDM: CPT | Mod: 25

## 2024-10-20 PROCEDURE — 36415 COLL VENOUS BLD VENIPUNCTURE: CPT

## 2024-10-20 PROCEDURE — 80053 COMPREHEN METABOLIC PANEL: CPT

## 2024-10-20 PROCEDURE — 71046 X-RAY EXAM CHEST 2 VIEWS: CPT

## 2024-10-20 PROCEDURE — 93005 ELECTROCARDIOGRAM TRACING: CPT

## 2024-10-20 RX ORDER — METHOCARBAMOL 100 MG/ML
1000 INJECTION, SOLUTION INTRAMUSCULAR; INTRAVENOUS ONCE
Status: COMPLETED | OUTPATIENT
Start: 2024-10-20 | End: 2024-10-20

## 2024-10-20 RX ORDER — ACETAMINOPHEN 325 MG/1
975 TABLET ORAL ONCE
Status: COMPLETED | OUTPATIENT
Start: 2024-10-20 | End: 2024-10-20

## 2024-10-20 RX ORDER — ARIPIPRAZOLE 5 MG/1
5 TABLET ORAL DAILY
Qty: 30 TABLET | Refills: 3 | Status: SHIPPED | OUTPATIENT
Start: 2024-10-20 | End: 2025-10-20

## 2024-10-20 RX ORDER — NALTREXONE HYDROCHLORIDE 50 MG/1
50 TABLET, FILM COATED ORAL DAILY
Qty: 30 TABLET | Refills: 11 | Status: SHIPPED | OUTPATIENT
Start: 2024-10-20 | End: 2025-10-20

## 2024-10-20 RX ORDER — ACETAMINOPHEN 325 MG/1
975 TABLET ORAL ONCE
Status: DISCONTINUED | OUTPATIENT
Start: 2024-10-20 | End: 2024-10-20

## 2024-10-20 RX ORDER — IBUPROFEN 200 MG
400 TABLET ORAL EVERY 6 HOURS PRN
Qty: 24 TABLET | Refills: 0 | Status: SHIPPED | OUTPATIENT
Start: 2024-10-20 | End: 2024-10-24

## 2024-10-20 RX ORDER — LANOLIN ALCOHOL/MO/W.PET/CERES
100 CREAM (GRAM) TOPICAL DAILY
Qty: 30 TABLET | Refills: 3 | Status: SHIPPED | OUTPATIENT
Start: 2024-10-20 | End: 2025-10-20

## 2024-10-20 RX ORDER — FUROSEMIDE 40 MG/1
TABLET ORAL
Status: COMPLETED
Start: 2024-10-20 | End: 2024-10-20

## 2024-10-20 RX ORDER — FUROSEMIDE 40 MG/1
40 TABLET ORAL ONCE
Status: COMPLETED | OUTPATIENT
Start: 2024-10-20 | End: 2024-10-20

## 2024-10-20 RX ORDER — SPIRONOLACTONE 50 MG/1
50 TABLET, FILM COATED ORAL DAILY
Qty: 30 TABLET | Refills: 0 | Status: SHIPPED | OUTPATIENT
Start: 2024-10-20 | End: 2025-02-17

## 2024-10-20 RX ORDER — FUROSEMIDE 40 MG/1
40 TABLET ORAL DAILY
Qty: 30 TABLET | Refills: 0 | Status: SHIPPED | OUTPATIENT
Start: 2024-10-20 | End: 2024-11-20

## 2024-10-20 RX ORDER — METHOCARBAMOL 500 MG/1
500 TABLET, FILM COATED ORAL 2 TIMES DAILY PRN
Qty: 6 TABLET | Refills: 0 | Status: SHIPPED | OUTPATIENT
Start: 2024-10-20 | End: 2024-10-24

## 2024-10-20 ASSESSMENT — LIFESTYLE VARIABLES
EVER FELT BAD OR GUILTY ABOUT YOUR DRINKING: NO
EVER HAD A DRINK FIRST THING IN THE MORNING TO STEADY YOUR NERVES TO GET RID OF A HANGOVER: NO
HAVE PEOPLE ANNOYED YOU BY CRITICIZING YOUR DRINKING: NO
HAVE YOU EVER FELT YOU SHOULD CUT DOWN ON YOUR DRINKING: NO
TOTAL SCORE: 0

## 2024-10-20 ASSESSMENT — PAIN - FUNCTIONAL ASSESSMENT: PAIN_FUNCTIONAL_ASSESSMENT: 0-10

## 2024-10-20 ASSESSMENT — PAIN SCALES - GENERAL: PAINLEVEL_OUTOF10: 4

## 2024-10-20 NOTE — ED PROVIDER NOTES
History of Present Illness     History provided by: Patient  Limitations to History: None  External Records Reviewed with Brief Summary:  Previous ED and hospital records for past medical history and HPI    HPI:  Yolie Moreno is a 64 y.o. female with past medical history of hepatitis C, decompensated alcoholic liver cirrhosis with ascites requiring paracentesis every 2 weeks, esophageal varices alcohol abuse disorder, and bipolar disorder that presents for evaluation of left lumbar MSK spasm that is been occurring over the last 2 days.  She states this has happened before and states that is been tracked up to being related to her liver disease.  She is due for her paracentesis tomorrow.  She is not any fevers chills chest pain.  She does endorse some mild shortness of breath related to just her fluid status.  She is not having any numbness tingling or weakness.  No falls or traumas.  No fevers or chills.    Physical Exam   Triage vitals:  T 37.2 °C (98.9 °F)  HR 92  BP 91/53  RR 18  O2 100 %      General: Awake, alert, in no acute distress  Eyes: Gaze conjugate. + Scleral icterus without injection  HENT: Normo-cephalic, atraumatic. No stridor  CV: Regular rate, regular rhythm. Radial pulses 2+ bilaterally  Resp: Breathing non-labored, speaking in full sentences.  Clear to auscultation bilaterally  GI: Distended with fluid wave appreciable no significant tenderness or guarding   : Deferred  MSK/Extremities: No gross bony deformities. Moving all extremities, no point tenderness step-offs or deformities of the CT or L-spine, patient does have hypertonicity of the lumbar musculature most notable on the left, negative straight leg test  Skin: Warm. Appropriate color  Neuro: Alert. Oriented. Face symmetric. Speech is fluent.  Gross strength and sensation intact in b/l UE and LEs  Psych: Appropriate mood and affect      Medical Decision Making & ED Course   Medical Decision Making:  Yolie Moreno is a 64 y.o.  year old female who is presenting with back pain. No new numbness/weakness/tingling in legs, bowel/bladder incontinence, new trauma, fever, unexpected weight loss, h/o cancer, h/o IVDU, or indwelling lines. No midline CT LS spine tenderness palpation or step-offs.  5 out of 5 strength in hip and knee flexion extension as well as ankle and great toe plantar dorsiflexion bilaterally.  Normal sensation to light touch in L1-S4 nerve roots bilaterally.  2+ DP pulses bilaterally.  No concern for osteomyelitis, discitis, epidural abscess, cord compression, fracture, or cauda equina.  I do not believe they require MRI or CT imaging at this time.  Patient was given Tylenol, robaxin, Patient reports improvement.  Patient had also been having some shortness of breath.  She is acutely fluid overloaded in the setting of time for paracentesis in the morning.  Basic labs were obtained for her that shows anemia at her baseline and no significant electrolyte renal or hepatic abnormalities from her baseline.  Patient states that she been off all all of her medications including her Lasix.  Home dose of Lasix given.  Patient's prescriptions were refilled to her preferred pharmacy at [facility] and patient was discharged home in stable condition. Patient was advised to follow up with their PCP in 1 week.  Her blood pressures were lower than previously recorded values here though patient states that she typically runs with systolics in the 90s to 100s.  Did  patient on the importance of holding blood pressure meds until she can follow-up with her primary care or prescribing physician to ensure safe to take.  Patient states that she will discuss while at her paracentesis appointment tomorrow.  Patient additionally sent home with multimodal pain control for her low back.  Patient was advised to return if bowel or bladder incontinence, inability to ambulate or weakness or loss of sensation.  Patient is agreeable with plan for  home-going and all questions answered.  ----  Social Determinants of Health which Significantly Impact Care: Difficulty paying for/obtaining medications The following actions were taken to address these social determinants: Medications resent to pharmacy    EKG Independent Interpretation: EKG interpreted by myself. Please see ED Course and MDM for full interpretation.    Independent Result Review and Interpretation: Results were independently reviewed and interpreted by myself. Please see ED course and [facility] for full interpretation.    Chronic conditions affecting the patient's care: As documented in the MDM    The patient was discussed with the following consultants/services: None    Care Considerations: As per [facility]    ED Course:  ED Course as of 10/20/24 1824   Sun Oct 20, 2024   1637 ECG 12 lead  EKG was sinus rhythm with early repolarization changes in lead V2 heart rate of 92 bpm RI interval 124 MS QRS 84 MS QTc 445 MS within normal limits no acute ST segment elevations or T wave inversions, patient has resolution of T wave inversions in aVF and decreased depth of inversions in lead III compared to EKG from April 2024   [SC]   1805 64-year-old female with history of cirrhosis due to alcoholic liver disease, hypertension, schizophrenia presents with left-sided low back pain.  Patient gets scheduled paracenteses every 2 weeks and has her next one scheduled for tomorrow.  Once before she has developed a left lumbar back pain in the context of being volume overloaded and having increasing abdominal girth.  Patient states her left lower back has been hurting for the better part of 4 days.  This is in the absence of any recent trauma or exertion.  There is been no fever there is been no associated abdominal pain.  Patient is not anticoagulated.  Patient also has been out of her meds for 2 weeks which includes 2 diuretics.  In the past patient was treated with anti-inflammatories for this pain which resolved and then  went away completely following her scheduled paracenteses  Physical exam patient was initially 90s over 60s but then responded without intervention to 110 over 70s which is her baseline blood pressure.  Patient is normal cardiac.  Patient has clear breath sounds without any edema.  Normal heart sounds.  Patient has a nontender but distended abdomen with a palpable fluid wave.  Patient has tenderness over her left flank that is worsened with twisting but not with palpation.  She has no pain along the CT or L-spine.  Patient with no pain on straight leg raise and intact strength and sensation of the bilateral lower extremities  Patient with likely lumbar back strain in the context of abdominal distention from known cirrhotic liver disease.  Patient has scheduled paracentesis tomorrow.  Patient has no red flags for spinal cord compression and no trauma that makes me concern for retroperitoneal concern.  Plan will be anti-inflammatories and a refill of her home prescriptions.  Patient's blood pressure is now back at baseline and patient does not appear hypovolemic has no bleeding narrative and looks very well.  Patient be discharged with meds to her scheduled paracentesis tomorrow [CM]      ED Course User Index  [CM] Salbador Rosen MD  [SC] Aminta Booth DO         Diagnoses as of 10/20/24 1824   Chronic left-sided low back pain without sciatica   Lumbar paraspinal muscle spasm     Disposition   As a result of the work-up, the patient was discharged home.  she was informed of her diagnosis and instructed to come back with any concerns or worsening of condition.  she and was agreeable to the plan as discussed above.  she was given the opportunity to ask questions.  All of the patient's questions were answered.    Procedures   Procedures    Patient seen and discussed with ED attending physician.    Aminta Booth DO  Emergency Medicine     Aminta Booth DO  Resident  10/23/24 8171

## 2024-10-20 NOTE — DISCHARGE INSTRUCTIONS
You are seen for evaluation of back pain in the emergency department and given muscle relaxer and pain medicine with improvement.  We are sending you with a very short course of anti-inflammatory medicines and muscle relaxer to use as needed.    I have refilled your prescriptions to the [facility] pharmacy and they will be ready for pickup tomorrow.  Please be sure to not take your blood pressure medicine until after you have your appointment for your paracentesis.  And be sure to follow-up with your primary care doctor regarding further refills.  I did give you your Lasix this evening to help with your generalized swelling.

## 2024-10-20 NOTE — ED TRIAGE NOTES
PT REPORTS LOW BACK PAIN X4 DAYS. PT DENIES RECENT TRAUMA. PT STATES SHE HAS CHRONIC BACK PAIN DUE TO THE CIRRHOSIS. DENIES HEMATOCHEZIA, HEMATEMESIS. PT ABD IS SWOLLEN, BILATERAL LOWER EXTREMITIES ARE SWOLLEN AND REPORTS SOB.     HX OF ALCOHOL ABUSE (LAST DRINK 2 WEEKS AGO), CIRRHOSIS, HEP C, HTN, ESOPHAGEAL VARICES, MDD AND BIPOLAR DISORDER.     PT ALSO STATES SHE HAS BEEN OUT OF ALL MEDS FOR WEEKS.

## 2024-10-21 ENCOUNTER — HOSPITAL ENCOUNTER (OUTPATIENT)
Dept: RADIOLOGY | Facility: HOSPITAL | Age: 64
Discharge: HOME | End: 2024-10-21
Payer: COMMERCIAL

## 2024-10-21 ENCOUNTER — PHARMACY VISIT (OUTPATIENT)
Dept: PHARMACY | Facility: CLINIC | Age: 64
End: 2024-10-21
Payer: MEDICAID

## 2024-10-21 DIAGNOSIS — R18.8 OTHER ASCITES: Primary | ICD-10-CM

## 2024-10-21 DIAGNOSIS — K70.31 ALCOHOLIC CIRRHOSIS OF LIVER WITH ASCITES (MULTI): ICD-10-CM

## 2024-10-21 PROCEDURE — RXMED WILLOW AMBULATORY MEDICATION CHARGE

## 2024-10-21 PROCEDURE — 49083 ABD PARACENTESIS W/IMAGING: CPT

## 2024-10-21 PROCEDURE — 49083 ABD PARACENTESIS W/IMAGING: CPT | Performed by: PHYSICIAN ASSISTANT

## 2024-10-21 PROCEDURE — P9047 ALBUMIN (HUMAN), 25%, 50ML: HCPCS | Mod: JZ,SE | Performed by: PHYSICIAN ASSISTANT

## 2024-10-21 PROCEDURE — 2500000004 HC RX 250 GENERAL PHARMACY W/ HCPCS (ALT 636 FOR OP/ED): Mod: JZ,SE | Performed by: PHYSICIAN ASSISTANT

## 2024-10-21 RX ORDER — ALBUMIN HUMAN 250 G/1000ML
50 SOLUTION INTRAVENOUS ONCE
Status: COMPLETED | OUTPATIENT
Start: 2024-10-21 | End: 2024-10-21

## 2024-10-21 NOTE — POST-PROCEDURE NOTE
INTERVENTIONAL RADIOLOGY ADVANCED PRACTICE PROCEDURE  [facility]    A time out was performed and Right Hemiabdomen was examined with US and appropriate entry point was confirmed and marked.   The patient was prepped and draped in a sterile manner, 1% lidocaine was used to anesthesize the skin and subcutaneous tissue.   A 5F Centesis needle was then introduced through the skin into the peritoneal space, the centesis catheter was then threaded without difficulty.   7600 ml of yellow fluid was removed without difficulty. The catheter was then removed.   No immediate complications were noted during and immediately following the procedure.

## 2024-10-21 NOTE — PROGRESS NOTES
Yolie Moreno is a 64 y.o. female on day 0 of admission presenting with No Principal Problem: There is no principal problem currently on the Problem List. Please update the Problem List and refresh..    Subjective   ***       Objective     Physical Exam    Last Recorded Vitals  There were no vitals taken for this visit.  Intake/Output last 3 Shifts:  No intake/output data recorded.    Relevant Results  {If you would like to pull in Medications, type .meds     If you would like to pull in Lab results for the last 24 hours, type .[id number]    If you would like to pull in Imaging results, type .imgrslt :99}    {Link to Stroke Scoring tools - Link :99}                        Assessment/Plan   Assessment & Plan    ***     {This patient does not have an ACP note on file for this encounter, please fill one out - Advance Care Planning Activity :99}      Ben Booth MA

## 2024-10-27 ENCOUNTER — HOSPITAL ENCOUNTER (EMERGENCY)
Facility: HOSPITAL | Age: 64
Discharge: HOME | End: 2024-10-28
Attending: EMERGENCY MEDICINE
Payer: COMMERCIAL

## 2024-10-27 DIAGNOSIS — R60.1 ANASARCA: Primary | ICD-10-CM

## 2024-10-27 LAB
ALBUMIN SERPL BCP-MCNC: 2.9 G/DL (ref 3.4–5)
ALP SERPL-CCNC: 134 U/L (ref 33–136)
ALT SERPL W P-5'-P-CCNC: 55 U/L (ref 7–45)
ANION GAP SERPL CALC-SCNC: 8 MMOL/L (ref 10–20)
AST SERPL W P-5'-P-CCNC: 93 U/L (ref 9–39)
BASOPHILS # BLD AUTO: 0.11 X10*3/UL (ref 0–0.1)
BASOPHILS NFR BLD AUTO: 1.9 %
BILIRUB SERPL-MCNC: 2.1 MG/DL (ref 0–1.2)
BUN SERPL-MCNC: 10 MG/DL (ref 6–23)
CALCIUM SERPL-MCNC: 8.1 MG/DL (ref 8.6–10.6)
CHLORIDE SERPL-SCNC: 107 MMOL/L (ref 98–107)
CO2 SERPL-SCNC: 23 MMOL/L (ref 21–32)
CREAT SERPL-MCNC: 0.73 MG/DL (ref 0.5–1.05)
EGFRCR SERPLBLD CKD-EPI 2021: >90 ML/MIN/1.73M*2
EOSINOPHIL # BLD AUTO: 0.46 X10*3/UL (ref 0–0.7)
EOSINOPHIL NFR BLD AUTO: 7.9 %
ERYTHROCYTE [DISTWIDTH] IN BLOOD BY AUTOMATED COUNT: 16.7 % (ref 11.5–14.5)
GLUCOSE BLD MANUAL STRIP-MCNC: 61 MG/DL (ref 74–99)
GLUCOSE SERPL-MCNC: 72 MG/DL (ref 74–99)
HCT VFR BLD AUTO: 34.4 % (ref 36–46)
HGB BLD-MCNC: 10.6 G/DL (ref 12–16)
IMM GRANULOCYTES # BLD AUTO: 0.03 X10*3/UL (ref 0–0.7)
IMM GRANULOCYTES NFR BLD AUTO: 0.5 % (ref 0–0.9)
LYMPHOCYTES # BLD AUTO: 0.96 X10*3/UL (ref 1.2–4.8)
LYMPHOCYTES NFR BLD AUTO: 16.5 %
MCH RBC QN AUTO: 28.7 PG (ref 26–34)
MCHC RBC AUTO-ENTMCNC: 30.8 G/DL (ref 32–36)
MCV RBC AUTO: 93 FL (ref 80–100)
MONOCYTES # BLD AUTO: 1.55 X10*3/UL (ref 0.1–1)
MONOCYTES NFR BLD AUTO: 26.7 %
NEUTROPHILS # BLD AUTO: 2.7 X10*3/UL (ref 1.2–7.7)
NEUTROPHILS NFR BLD AUTO: 46.5 %
NRBC BLD-RTO: 0 /100 WBCS (ref 0–0)
PLATELET # BLD AUTO: 87 X10*3/UL (ref 150–450)
POTASSIUM SERPL-SCNC: 4.3 MMOL/L (ref 3.5–5.3)
PROT SERPL-MCNC: 6.3 G/DL (ref 6.4–8.2)
RBC # BLD AUTO: 3.69 X10*6/UL (ref 4–5.2)
SODIUM SERPL-SCNC: 134 MMOL/L (ref 136–145)
WBC # BLD AUTO: 5.8 X10*3/UL (ref 4.4–11.3)

## 2024-10-27 PROCEDURE — 99284 EMERGENCY DEPT VISIT MOD MDM: CPT | Performed by: EMERGENCY MEDICINE

## 2024-10-27 PROCEDURE — 85025 COMPLETE CBC W/AUTO DIFF WBC: CPT | Performed by: EMERGENCY MEDICINE

## 2024-10-27 PROCEDURE — 82947 ASSAY GLUCOSE BLOOD QUANT: CPT | Mod: 59

## 2024-10-27 PROCEDURE — 2500000001 HC RX 250 WO HCPCS SELF ADMINISTERED DRUGS (ALT 637 FOR MEDICARE OP): Mod: SE

## 2024-10-27 PROCEDURE — 80053 COMPREHEN METABOLIC PANEL: CPT | Performed by: EMERGENCY MEDICINE

## 2024-10-27 PROCEDURE — 36415 COLL VENOUS BLD VENIPUNCTURE: CPT | Performed by: EMERGENCY MEDICINE

## 2024-10-27 PROCEDURE — 99283 EMERGENCY DEPT VISIT LOW MDM: CPT

## 2024-10-27 RX ORDER — ACETAMINOPHEN 325 MG/1
650 TABLET ORAL ONCE
Status: COMPLETED | OUTPATIENT
Start: 2024-10-27 | End: 2024-10-27

## 2024-10-27 RX ORDER — LORAZEPAM 2 MG/ML
INJECTION INTRAMUSCULAR
Status: DISCONTINUED
Start: 2024-10-27 | End: 2024-10-28 | Stop reason: HOSPADM

## 2024-10-27 RX ORDER — METHOCARBAMOL 500 MG/1
500 TABLET, FILM COATED ORAL ONCE
Status: COMPLETED | OUTPATIENT
Start: 2024-10-27 | End: 2024-10-27

## 2024-10-27 RX ORDER — FUROSEMIDE 40 MG/1
40 TABLET ORAL ONCE
Status: COMPLETED | OUTPATIENT
Start: 2024-10-27 | End: 2024-10-27

## 2024-10-27 ASSESSMENT — LIFESTYLE VARIABLES
HAVE YOU EVER FELT YOU SHOULD CUT DOWN ON YOUR DRINKING: NO
HAVE PEOPLE ANNOYED YOU BY CRITICIZING YOUR DRINKING: NO
TOTAL SCORE: 0
EVER FELT BAD OR GUILTY ABOUT YOUR DRINKING: NO
EVER HAD A DRINK FIRST THING IN THE MORNING TO STEADY YOUR NERVES TO GET RID OF A HANGOVER: NO

## 2024-10-27 ASSESSMENT — PAIN DESCRIPTION - LOCATION: LOCATION: LEG

## 2024-10-27 ASSESSMENT — PAIN - FUNCTIONAL ASSESSMENT: PAIN_FUNCTIONAL_ASSESSMENT: 0-10

## 2024-10-27 ASSESSMENT — PAIN SCALES - GENERAL
PAINLEVEL_OUTOF10: 0 - NO PAIN
PAINLEVEL_OUTOF10: 10 - WORST POSSIBLE PAIN
PAINLEVEL_OUTOF10: 10 - WORST POSSIBLE PAIN

## 2024-10-28 VITALS
SYSTOLIC BLOOD PRESSURE: 119 MMHG | BODY MASS INDEX: 26.12 KG/M2 | WEIGHT: 153 LBS | DIASTOLIC BLOOD PRESSURE: 85 MMHG | TEMPERATURE: 96.8 F | OXYGEN SATURATION: 99 % | RESPIRATION RATE: 18 BRPM | HEART RATE: 78 BPM | HEIGHT: 64 IN

## 2024-11-01 ENCOUNTER — OFFICE VISIT (OUTPATIENT)
Dept: GASTROENTEROLOGY | Facility: HOSPITAL | Age: 64
End: 2024-11-01
Payer: COMMERCIAL

## 2024-11-01 VITALS
OXYGEN SATURATION: 98 % | HEART RATE: 96 BPM | SYSTOLIC BLOOD PRESSURE: 111 MMHG | WEIGHT: 180 LBS | BODY MASS INDEX: 30.73 KG/M2 | TEMPERATURE: 98.3 F | HEIGHT: 64 IN | DIASTOLIC BLOOD PRESSURE: 71 MMHG

## 2024-11-01 DIAGNOSIS — K70.31 ALCOHOLIC CIRRHOSIS OF LIVER WITH ASCITES (MULTI): ICD-10-CM

## 2024-11-01 DIAGNOSIS — K70.31 ASCITES DUE TO ALCOHOLIC CIRRHOSIS (MULTI): Primary | ICD-10-CM

## 2024-11-01 DIAGNOSIS — E88.09 EDEMA DUE TO HYPOALBUMINEMIA: ICD-10-CM

## 2024-11-01 PROCEDURE — 3074F SYST BP LT 130 MM HG: CPT | Performed by: NURSE PRACTITIONER

## 2024-11-01 PROCEDURE — 3008F BODY MASS INDEX DOCD: CPT | Performed by: NURSE PRACTITIONER

## 2024-11-01 PROCEDURE — 99215 OFFICE O/P EST HI 40 MIN: CPT | Performed by: NURSE PRACTITIONER

## 2024-11-01 PROCEDURE — 1036F TOBACCO NON-USER: CPT | Performed by: NURSE PRACTITIONER

## 2024-11-01 PROCEDURE — 3078F DIAST BP <80 MM HG: CPT | Performed by: NURSE PRACTITIONER

## 2024-11-01 RX ORDER — HEPARIN SODIUM,PORCINE/PF 10 UNIT/ML
50 SYRINGE (ML) INTRAVENOUS AS NEEDED
OUTPATIENT
Start: 2024-11-11

## 2024-11-01 RX ORDER — FAMOTIDINE 10 MG/ML
20 INJECTION INTRAVENOUS ONCE AS NEEDED
OUTPATIENT
Start: 2024-11-11

## 2024-11-01 RX ORDER — HEPARIN 100 UNIT/ML
500 SYRINGE INTRAVENOUS AS NEEDED
OUTPATIENT
Start: 2024-11-11

## 2024-11-01 RX ORDER — DIPHENHYDRAMINE HYDROCHLORIDE 50 MG/ML
50 INJECTION INTRAMUSCULAR; INTRAVENOUS AS NEEDED
OUTPATIENT
Start: 2024-11-11

## 2024-11-01 RX ORDER — ALBUTEROL SULFATE 0.83 MG/ML
3 SOLUTION RESPIRATORY (INHALATION) AS NEEDED
OUTPATIENT
Start: 2024-11-11

## 2024-11-01 RX ORDER — EPINEPHRINE 0.3 MG/.3ML
0.3 INJECTION SUBCUTANEOUS EVERY 5 MIN PRN
OUTPATIENT
Start: 2024-11-11

## 2024-11-01 RX ORDER — ALBUMIN HUMAN 250 G/1000ML
25 SOLUTION INTRAVENOUS ONCE
OUTPATIENT
Start: 2024-11-11

## 2024-11-01 ASSESSMENT — ENCOUNTER SYMPTOMS
SLEEP DISTURBANCE: 0
UNEXPECTED WEIGHT CHANGE: 0
CONFUSION: 0
HEMATURIA: 0
CONSTIPATION: 0
TREMORS: 0
CHILLS: 0
WEAKNESS: 1
JOINT SWELLING: 0
PALPITATIONS: 0
WHEEZING: 0
VOMITING: 0
BLOOD IN STOOL: 0
BRUISES/BLEEDS EASILY: 0
HEADACHES: 0
SHORTNESS OF BREATH: 1
NAUSEA: 0
LIGHT-HEADEDNESS: 0
ABDOMINAL PAIN: 1
NUMBNESS: 0
FREQUENCY: 0
DIFFICULTY URINATING: 0
APPETITE CHANGE: 0
VOICE CHANGE: 0
COLOR CHANGE: 0
AGITATION: 0
COUGH: 0
TROUBLE SWALLOWING: 0
DIARRHEA: 0
ABDOMINAL DISTENTION: 1
DIZZINESS: 0
FEVER: 0
DYSURIA: 0

## 2024-11-01 ASSESSMENT — PAIN SCALES - GENERAL: PAINLEVEL_OUTOF10: 10-WORST PAIN EVER

## 2024-11-04 ENCOUNTER — HOSPITAL ENCOUNTER (OUTPATIENT)
Dept: RADIOLOGY | Facility: HOSPITAL | Age: 64
Discharge: HOME | End: 2024-11-04
Payer: COMMERCIAL

## 2024-11-04 VITALS — DIASTOLIC BLOOD PRESSURE: 71 MMHG | SYSTOLIC BLOOD PRESSURE: 118 MMHG | OXYGEN SATURATION: 98 % | HEART RATE: 96 BPM

## 2024-11-04 DIAGNOSIS — K70.31 ALCOHOLIC CIRRHOSIS OF LIVER WITH ASCITES (MULTI): ICD-10-CM

## 2024-11-04 PROCEDURE — P9047 ALBUMIN (HUMAN), 25%, 50ML: HCPCS | Mod: JZ,SE | Performed by: NURSE PRACTITIONER

## 2024-11-04 PROCEDURE — 2500000001 HC RX 250 WO HCPCS SELF ADMINISTERED DRUGS (ALT 637 FOR MEDICARE OP): Mod: SE | Performed by: NURSE PRACTITIONER

## 2024-11-04 PROCEDURE — 49083 ABD PARACENTESIS W/IMAGING: CPT

## 2024-11-04 PROCEDURE — 2500000004 HC RX 250 GENERAL PHARMACY W/ HCPCS (ALT 636 FOR OP/ED): Mod: JZ,SE | Performed by: NURSE PRACTITIONER

## 2024-11-04 RX ORDER — ACETAMINOPHEN 325 MG/1
650 TABLET ORAL ONCE
Status: COMPLETED | OUTPATIENT
Start: 2024-11-04 | End: 2024-11-04

## 2024-11-04 RX ORDER — ALBUMIN HUMAN 250 G/1000ML
75 SOLUTION INTRAVENOUS ONCE
Status: COMPLETED | OUTPATIENT
Start: 2024-11-04 | End: 2024-11-04

## 2024-11-04 ASSESSMENT — PAIN DESCRIPTION - ORIENTATION: ORIENTATION: LOWER

## 2024-11-04 ASSESSMENT — PAIN DESCRIPTION - LOCATION: LOCATION: BACK

## 2024-11-04 ASSESSMENT — PAIN SCALES - GENERAL: PAINLEVEL_OUTOF10: 5 - MODERATE PAIN

## 2024-11-04 ASSESSMENT — PAIN - FUNCTIONAL ASSESSMENT: PAIN_FUNCTIONAL_ASSESSMENT: 0-10

## 2024-11-04 NOTE — POST-PROCEDURE NOTE
INTERVENTIONAL RADIOLOGY ADVANCED PRACTICE PROCEDURE  [facility]    A time out was performed and Left Hemiabdomen was examined with US and appropriate entry point was confirmed and marked.   The patient was prepped and draped in a sterile manner, 1% lidocaine was used to anesthesize the skin and subcutaneous tissue.   A 5F Centesis needle was then introduced through the skin into the peritoneal space, the centesis catheter was then threaded without difficulty.   06750 ml of yellow fluid was removed without difficulty. The catheter was then removed.   No immediate complications were noted during and immediately following the procedure.

## 2024-11-18 ENCOUNTER — PREP FOR PROCEDURE (OUTPATIENT)
Dept: RADIOLOGY | Facility: HOSPITAL | Age: 64
End: 2024-11-18
Payer: COMMERCIAL

## 2024-11-18 ENCOUNTER — HOSPITAL ENCOUNTER (OUTPATIENT)
Dept: RADIOLOGY | Facility: HOSPITAL | Age: 64
Discharge: HOME | End: 2024-11-18
Payer: COMMERCIAL

## 2024-11-18 VITALS — HEART RATE: 66 BPM | SYSTOLIC BLOOD PRESSURE: 125 MMHG | DIASTOLIC BLOOD PRESSURE: 77 MMHG | OXYGEN SATURATION: 96 %

## 2024-11-18 DIAGNOSIS — K70.31 ALCOHOLIC CIRRHOSIS OF LIVER WITH ASCITES (MULTI): Primary | ICD-10-CM

## 2024-11-18 DIAGNOSIS — K70.31 ALCOHOLIC CIRRHOSIS OF LIVER WITH ASCITES (MULTI): ICD-10-CM

## 2024-11-18 PROCEDURE — P9047 ALBUMIN (HUMAN), 25%, 50ML: HCPCS | Mod: JZ,SE | Performed by: PHYSICIAN ASSISTANT

## 2024-11-18 PROCEDURE — 49083 ABD PARACENTESIS W/IMAGING: CPT

## 2024-11-18 PROCEDURE — 2500000004 HC RX 250 GENERAL PHARMACY W/ HCPCS (ALT 636 FOR OP/ED): Mod: JZ,SE | Performed by: PHYSICIAN ASSISTANT

## 2024-11-18 RX ORDER — ALBUMIN HUMAN 250 G/1000ML
50 SOLUTION INTRAVENOUS ONCE
Status: COMPLETED | OUTPATIENT
Start: 2024-11-18 | End: 2024-11-18

## 2024-11-18 NOTE — POST-PROCEDURE NOTE
INTERVENTIONAL RADIOLOGY ADVANCED PRACTICE PROCEDURE  [facility]    A time out was performed and Right Hemiabdomen was examined with US and appropriate entry point was confirmed and marked.   The patient was prepped and draped in a sterile manner, 1% lidocaine was used to anesthesize the skin and subcutaneous tissue.   A 5F Centesis needle was then introduced through the skin into the peritoneal space, the centesis catheter was then threaded without difficulty.   9000 ml of yellow fluid was removed without difficulty. The catheter was then removed.   No immediate complications were noted during and immediately following the procedure.

## 2024-11-19 ENCOUNTER — HOSPITAL ENCOUNTER (OUTPATIENT)
Dept: RADIOLOGY | Facility: HOSPITAL | Age: 64
Discharge: HOME | End: 2024-11-19
Payer: COMMERCIAL

## 2024-11-19 ENCOUNTER — SOCIAL WORK (OUTPATIENT)
Dept: CASE MANAGEMENT | Facility: HOSPITAL | Age: 64
End: 2024-11-19
Payer: COMMERCIAL

## 2024-11-19 DIAGNOSIS — K70.31 ASCITES DUE TO ALCOHOLIC CIRRHOSIS (MULTI): ICD-10-CM

## 2024-11-19 PROCEDURE — 74177 CT ABD & PELVIS W/CONTRAST: CPT

## 2024-11-19 PROCEDURE — 74177 CT ABD & PELVIS W/CONTRAST: CPT | Performed by: RADIOLOGY

## 2024-11-19 PROCEDURE — 2550000001 HC RX 255 CONTRASTS: Mod: SE | Performed by: NURSE PRACTITIONER

## 2024-11-19 NOTE — PROGRESS NOTES
Referral per the  of [place]. Patient requesting a bus ticket to return home from her appointment. Patient has limited financial resources. A bus ticket was provided to her.    BINH Dickson

## 2024-11-25 ENCOUNTER — TELEPHONE (OUTPATIENT)
Dept: HEMATOLOGY/ONCOLOGY | Facility: HOSPITAL | Age: 64
End: 2024-11-25

## 2024-12-02 ENCOUNTER — HOSPITAL ENCOUNTER (OUTPATIENT)
Dept: RADIOLOGY | Facility: HOSPITAL | Age: 64
Discharge: HOME | End: 2024-12-02
Payer: COMMERCIAL

## 2024-12-02 VITALS — OXYGEN SATURATION: 99 % | SYSTOLIC BLOOD PRESSURE: 131 MMHG | HEART RATE: 116 BPM | DIASTOLIC BLOOD PRESSURE: 89 MMHG

## 2024-12-02 DIAGNOSIS — K70.31 ALCOHOLIC CIRRHOSIS OF LIVER WITH ASCITES (MULTI): ICD-10-CM

## 2024-12-02 PROCEDURE — 2500000004 HC RX 250 GENERAL PHARMACY W/ HCPCS (ALT 636 FOR OP/ED): Mod: JZ,SE | Performed by: NURSE PRACTITIONER

## 2024-12-02 PROCEDURE — P9047 ALBUMIN (HUMAN), 25%, 50ML: HCPCS | Mod: JZ,SE | Performed by: NURSE PRACTITIONER

## 2024-12-02 PROCEDURE — 49083 ABD PARACENTESIS W/IMAGING: CPT

## 2024-12-02 RX ORDER — ALBUMIN HUMAN 250 G/1000ML
62.5 SOLUTION INTRAVENOUS ONCE
Status: COMPLETED | OUTPATIENT
Start: 2024-12-02 | End: 2024-12-02

## 2024-12-02 NOTE — POST-PROCEDURE NOTE
INTERVENTIONAL RADIOLOGY ADVANCED PRACTICE PROCEDURE  [facility]    A time out was performed and Right Hemiabdomen was examined with US and appropriate entry point was confirmed and marked.   The patient was prepped and draped in a sterile manner, 1% lidocaine was used to anesthesize the skin and subcutaneous tissue.   A 5F Centesis needle was then introduced through the skin into the peritoneal space, the centesis catheter was then threaded without difficulty.   8900 ml of yellow fluid was removed without difficulty. The catheter was then removed.   No immediate complications were noted during and immediately following the procedure.

## 2024-12-09 ENCOUNTER — TELEPHONE (OUTPATIENT)
Dept: HEMATOLOGY/ONCOLOGY | Facility: HOSPITAL | Age: 64
End: 2024-12-09
Payer: COMMERCIAL

## 2024-12-09 NOTE — TELEPHONE ENCOUNTER
Patient was phoned for a missed albumin infusion appointment.  She stated she told her provider that she did not want anymore albumin treatments and that she would not be in for her 12/23/24 appointment either.  She then abruptly hung up the phone.  Her provider was notified.

## 2024-12-16 ENCOUNTER — HOSPITAL ENCOUNTER (OUTPATIENT)
Dept: RADIOLOGY | Facility: HOSPITAL | Age: 64
Discharge: HOME | End: 2024-12-16
Payer: COMMERCIAL

## 2024-12-16 VITALS — OXYGEN SATURATION: 97 % | SYSTOLIC BLOOD PRESSURE: 130 MMHG | DIASTOLIC BLOOD PRESSURE: 86 MMHG | HEART RATE: 106 BPM

## 2024-12-16 DIAGNOSIS — K70.31 ALCOHOLIC CIRRHOSIS OF LIVER WITH ASCITES (MULTI): ICD-10-CM

## 2024-12-16 PROCEDURE — 2500000004 HC RX 250 GENERAL PHARMACY W/ HCPCS (ALT 636 FOR OP/ED): Mod: JZ,SE | Performed by: NURSE PRACTITIONER

## 2024-12-16 PROCEDURE — 49083 ABD PARACENTESIS W/IMAGING: CPT

## 2024-12-16 PROCEDURE — P9047 ALBUMIN (HUMAN), 25%, 50ML: HCPCS | Mod: JZ,SE | Performed by: NURSE PRACTITIONER

## 2024-12-16 RX ORDER — ALBUMIN HUMAN 250 G/1000ML
62.5 SOLUTION INTRAVENOUS ONCE
Status: COMPLETED | OUTPATIENT
Start: 2024-12-16 | End: 2024-12-16

## 2024-12-16 NOTE — POST-PROCEDURE NOTE
INTERVENTIONAL RADIOLOGY ADVANCED PRACTICE PROCEDURE  [facility]    A time out was performed and Left Hemiabdomen was examined with US and appropriate entry point was confirmed and marked.   The patient was prepped and draped in a sterile manner, 1% lidocaine was used to anesthesize the skin and subcutaneous tissue.   A 5F Centesis needle was then introduced through the skin into the peritoneal space, the centesis catheter was then threaded without difficulty.   62313 ml of yellow fluid was removed without difficulty. The catheter was then removed.   No immediate complications were noted during and immediately following the procedure.

## 2024-12-26 ENCOUNTER — CLINICAL SUPPORT (OUTPATIENT)
Dept: NUTRITION | Facility: HOSPITAL | Age: 64
End: 2024-12-26
Payer: COMMERCIAL

## 2024-12-26 NOTE — PROGRESS NOTES
[employer]  Diet Recommendation 1: Heart Healthy  Diet Recommendation 2: Protein, High  Diet Recommendation 3: Healthy Eating  Other Diet Recommendations: Ascites  Food Intolerance Avoidance: NKFA  Household Size: 3 Family Members  Interventions: Referral Number: 5th 6 Mo Referral 2.5 yrs (Referrals may not be consecutive)  Interventions: Visit Number: 1 of 6 Visits - Max 6 Visits/Referral Each 6 Mo Period  Education Today: [employer]  Follow Up Notes for Future Visits: Needs new referral. Got ingredients to cook for the New Year, cooks for all the holidays!  Grains: 0-25% Whole  Fruit: Fresh - 100%  Vegetables: 25-50% Fresh  Proteins: 0 Plant-based Items  Dairy: 0-25% Lowfat  Originating Site of Referral Order: [facility]  Initials of RD Assisting Today: SHARRI

## 2024-12-30 ENCOUNTER — HOSPITAL ENCOUNTER (OUTPATIENT)
Dept: RADIOLOGY | Facility: HOSPITAL | Age: 64
Discharge: HOME | End: 2024-12-30
Payer: COMMERCIAL

## 2024-12-30 VITALS — HEART RATE: 94 BPM | SYSTOLIC BLOOD PRESSURE: 111 MMHG | DIASTOLIC BLOOD PRESSURE: 81 MMHG | OXYGEN SATURATION: 98 %

## 2024-12-30 DIAGNOSIS — K70.31 ALCOHOLIC CIRRHOSIS OF LIVER WITH ASCITES (MULTI): ICD-10-CM

## 2024-12-30 PROCEDURE — P9047 ALBUMIN (HUMAN), 25%, 50ML: HCPCS | Mod: JZ,SE | Performed by: NURSE PRACTITIONER

## 2024-12-30 PROCEDURE — 49083 ABD PARACENTESIS W/IMAGING: CPT

## 2024-12-30 PROCEDURE — 2500000004 HC RX 250 GENERAL PHARMACY W/ HCPCS (ALT 636 FOR OP/ED): Mod: JZ,SE | Performed by: NURSE PRACTITIONER

## 2024-12-30 RX ORDER — ALBUMIN HUMAN 250 G/1000ML
62.5 SOLUTION INTRAVENOUS ONCE
Status: COMPLETED | OUTPATIENT
Start: 2024-12-30 | End: 2024-12-30

## 2024-12-30 RX ADMIN — ALBUMIN HUMAN 62.5 G: 0.25 SOLUTION INTRAVENOUS at 10:22

## 2024-12-30 NOTE — POST-PROCEDURE NOTE
INTERVENTIONAL RADIOLOGY ADVANCED PRACTICE PROCEDURE  [facility]    A time out was performed and Right Hemiabdomen was examined with US and appropriate entry point was confirmed and marked.   The patient was prepped and draped in a sterile manner, 1% lidocaine was used to anesthesize the skin and subcutaneous tissue.   A 5F Centesis needle was then introduced through the skin into the peritoneal space, the centesis catheter was then threaded without difficulty.   9600 ml of yellow fluid was removed without difficulty. The catheter was then removed.   No immediate complications were noted during and immediately following the procedure.

## 2025-01-06 ENCOUNTER — APPOINTMENT (OUTPATIENT)
Dept: HEMATOLOGY/ONCOLOGY | Facility: HOSPITAL | Age: 65
End: 2025-01-06
Payer: COMMERCIAL

## 2025-01-13 ENCOUNTER — HOSPITAL ENCOUNTER (OUTPATIENT)
Dept: RADIOLOGY | Facility: HOSPITAL | Age: 65
Discharge: HOME | End: 2025-01-13
Payer: COMMERCIAL

## 2025-01-13 VITALS — HEART RATE: 101 BPM | DIASTOLIC BLOOD PRESSURE: 74 MMHG | SYSTOLIC BLOOD PRESSURE: 120 MMHG | OXYGEN SATURATION: 98 %

## 2025-01-13 DIAGNOSIS — K70.31 ALCOHOLIC CIRRHOSIS OF LIVER WITH ASCITES (MULTI): ICD-10-CM

## 2025-01-13 PROCEDURE — 49083 ABD PARACENTESIS W/IMAGING: CPT

## 2025-01-13 PROCEDURE — 2500000004 HC RX 250 GENERAL PHARMACY W/ HCPCS (ALT 636 FOR OP/ED): Mod: JZ,SE,TB | Performed by: PHYSICIAN ASSISTANT

## 2025-01-13 PROCEDURE — P9047 ALBUMIN (HUMAN), 25%, 50ML: HCPCS | Mod: JZ,SE,TB | Performed by: PHYSICIAN ASSISTANT

## 2025-01-13 RX ORDER — ALBUMIN HUMAN 250 G/1000ML
37.5 SOLUTION INTRAVENOUS ONCE
Status: COMPLETED | OUTPATIENT
Start: 2025-01-13 | End: 2025-01-13

## 2025-01-13 RX ADMIN — ALBUMIN HUMAN 37.5 G: 0.25 SOLUTION INTRAVENOUS at 11:28

## 2025-01-13 NOTE — POST-PROCEDURE NOTE
INTERVENTIONAL RADIOLOGY ADVANCED PRACTICE PROCEDURE  [facility]    A time out was performed and Right Hemiabdomen was examined with US and appropriate entry point was confirmed and marked.   The patient was prepped and draped in a sterile manner, 1% lidocaine was used to anesthesize the skin and subcutaneous tissue.   A 5F Centesis needle was then introduced through the skin into the peritoneal space, the centesis catheter was then threaded without difficulty.   7200 ml of yellow fluid was removed without difficulty. The catheter was then removed.   No immediate complications were noted during and immediately following the procedure.

## 2025-01-20 ENCOUNTER — APPOINTMENT (OUTPATIENT)
Dept: HEMATOLOGY/ONCOLOGY | Facility: HOSPITAL | Age: 65
End: 2025-01-20
Payer: COMMERCIAL

## 2025-01-27 ENCOUNTER — HOSPITAL ENCOUNTER (OUTPATIENT)
Dept: RADIOLOGY | Facility: HOSPITAL | Age: 65
Discharge: HOME | End: 2025-01-27
Payer: COMMERCIAL

## 2025-01-27 VITALS
TEMPERATURE: 97.7 F | DIASTOLIC BLOOD PRESSURE: 73 MMHG | SYSTOLIC BLOOD PRESSURE: 111 MMHG | HEART RATE: 84 BPM | OXYGEN SATURATION: 100 %

## 2025-01-27 DIAGNOSIS — K70.31 ALCOHOLIC CIRRHOSIS OF LIVER WITH ASCITES (MULTI): ICD-10-CM

## 2025-01-27 PROCEDURE — 2500000004 HC RX 250 GENERAL PHARMACY W/ HCPCS (ALT 636 FOR OP/ED): Mod: JZ,SE,TB | Performed by: PHYSICIAN ASSISTANT

## 2025-01-27 PROCEDURE — 49083 ABD PARACENTESIS W/IMAGING: CPT

## 2025-01-27 PROCEDURE — P9047 ALBUMIN (HUMAN), 25%, 50ML: HCPCS | Mod: JZ,SE,TB | Performed by: PHYSICIAN ASSISTANT

## 2025-01-27 RX ORDER — ALBUMIN HUMAN 250 G/1000ML
50 SOLUTION INTRAVENOUS ONCE
Status: COMPLETED | OUTPATIENT
Start: 2025-01-27 | End: 2025-01-27

## 2025-01-27 RX ADMIN — ALBUMIN HUMAN 50 G: 0.25 SOLUTION INTRAVENOUS at 10:44

## 2025-01-27 ASSESSMENT — PAIN - FUNCTIONAL ASSESSMENT: PAIN_FUNCTIONAL_ASSESSMENT: 0-10

## 2025-01-27 ASSESSMENT — PAIN SCALES - GENERAL: PAINLEVEL_OUTOF10: 0 - NO PAIN

## 2025-01-27 NOTE — POST-PROCEDURE NOTE
INTERVENTIONAL RADIOLOGY ADVANCED PRACTICE PROCEDURE  [facility]    A time out was performed and Left Hemiabdomen was examined with US and appropriate entry point was confirmed and marked.   The patient was prepped and draped in a sterile manner, 1% lidocaine was used to anesthesize the skin and subcutaneous tissue.   A 5F Centesis needle was then introduced through the skin into the peritoneal space, the centesis catheter was then threaded without difficulty.   7900 ml of yellow fluid was removed without difficulty. The catheter was then removed.   No immediate complications were noted during and immediately following the procedure.

## 2025-01-30 ENCOUNTER — HOSPITAL ENCOUNTER (EMERGENCY)
Facility: HOSPITAL | Age: 65
Discharge: LEFT BEFORE DISCHARGE | End: 2025-01-30
Attending: EMERGENCY MEDICINE
Payer: COMMERCIAL

## 2025-01-30 ENCOUNTER — CLINICAL SUPPORT (OUTPATIENT)
Dept: EMERGENCY MEDICINE | Facility: HOSPITAL | Age: 65
End: 2025-01-30
Payer: COMMERCIAL

## 2025-01-30 VITALS
TEMPERATURE: 98.4 F | OXYGEN SATURATION: 96 % | HEIGHT: 67 IN | RESPIRATION RATE: 18 BRPM | BODY MASS INDEX: 24.01 KG/M2 | SYSTOLIC BLOOD PRESSURE: 124 MMHG | HEART RATE: 103 BPM | WEIGHT: 153 LBS | DIASTOLIC BLOOD PRESSURE: 78 MMHG

## 2025-01-30 DIAGNOSIS — R06.02 SHORTNESS OF BREATH: Primary | ICD-10-CM

## 2025-01-30 LAB
ALBUMIN SERPL BCP-MCNC: 3.6 G/DL (ref 3.4–5)
ALP SERPL-CCNC: 194 U/L (ref 33–136)
ALT SERPL W P-5'-P-CCNC: 53 U/L (ref 7–45)
AMPHETAMINES UR QL SCN: ABNORMAL
ANION GAP BLDV CALCULATED.4IONS-SCNC: 11 MMOL/L (ref 10–25)
ANION GAP SERPL CALC-SCNC: 11 MMOL/L (ref 10–20)
APAP SERPL-MCNC: <10 UG/ML
APTT PPP: 30 SECONDS (ref 27–38)
AST SERPL W P-5'-P-CCNC: 122 U/L (ref 9–39)
ATRIAL RATE: 100 BPM
BARBITURATES UR QL SCN: ABNORMAL
BASE EXCESS BLDV CALC-SCNC: -2.4 MMOL/L (ref -2–3)
BASOPHILS # BLD AUTO: 0.1 X10*3/UL (ref 0–0.1)
BASOPHILS NFR BLD AUTO: 1.6 %
BENZODIAZ UR QL SCN: ABNORMAL
BILIRUB DIRECT SERPL-MCNC: 0.4 MG/DL (ref 0–0.3)
BILIRUB SERPL-MCNC: 1.4 MG/DL (ref 0–1.2)
BNP SERPL-MCNC: 12 PG/ML (ref 0–99)
BODY TEMPERATURE: 37 DEGREES CELSIUS
BUN SERPL-MCNC: 9 MG/DL (ref 6–23)
BZE UR QL SCN: ABNORMAL
CA-I BLDV-SCNC: 1.07 MMOL/L (ref 1.1–1.33)
CALCIUM SERPL-MCNC: 8.6 MG/DL (ref 8.6–10.6)
CANNABINOIDS UR QL SCN: ABNORMAL
CARDIAC TROPONIN I PNL SERPL HS: 6 NG/L (ref 0–34)
CHLORIDE BLDV-SCNC: 109 MMOL/L (ref 98–107)
CHLORIDE SERPL-SCNC: 107 MMOL/L (ref 98–107)
CO2 SERPL-SCNC: 23 MMOL/L (ref 21–32)
CREAT SERPL-MCNC: 0.79 MG/DL (ref 0.5–1.05)
EGFRCR SERPLBLD CKD-EPI 2021: 85 ML/MIN/1.73M*2
EOSINOPHIL # BLD AUTO: 0.39 X10*3/UL (ref 0–0.7)
EOSINOPHIL NFR BLD AUTO: 6.2 %
ERYTHROCYTE [DISTWIDTH] IN BLOOD BY AUTOMATED COUNT: 15.4 % (ref 11.5–14.5)
ETHANOL SERPL-MCNC: 314 MG/DL
FENTANYL+NORFENTANYL UR QL SCN: ABNORMAL
GLUCOSE BLDV-MCNC: 102 MG/DL (ref 74–99)
GLUCOSE SERPL-MCNC: 97 MG/DL (ref 74–99)
HCO3 BLDV-SCNC: 23.2 MMOL/L (ref 22–26)
HCT VFR BLD AUTO: 34.1 % (ref 36–46)
HCT VFR BLD EST: 35 % (ref 36–46)
HGB BLD-MCNC: 11.5 G/DL (ref 12–16)
HGB BLDV-MCNC: 11.8 G/DL (ref 12–16)
IMM GRANULOCYTES # BLD AUTO: 0.01 X10*3/UL (ref 0–0.7)
IMM GRANULOCYTES NFR BLD AUTO: 0.2 % (ref 0–0.9)
INHALED O2 CONCENTRATION: 21 %
INR PPP: 1.1 (ref 0.9–1.1)
LACTATE BLDV-SCNC: 1.7 MMOL/L (ref 0.4–2)
LIPASE SERPL-CCNC: 217 U/L (ref 9–82)
LYMPHOCYTES # BLD AUTO: 1.59 X10*3/UL (ref 1.2–4.8)
LYMPHOCYTES NFR BLD AUTO: 25.2 %
MCH RBC QN AUTO: 28 PG (ref 26–34)
MCHC RBC AUTO-ENTMCNC: 33.7 G/DL (ref 32–36)
MCV RBC AUTO: 83 FL (ref 80–100)
METHADONE UR QL SCN: ABNORMAL
MONOCYTES # BLD AUTO: 1.42 X10*3/UL (ref 0.1–1)
MONOCYTES NFR BLD AUTO: 22.5 %
NEUTROPHILS # BLD AUTO: 2.81 X10*3/UL (ref 1.2–7.7)
NEUTROPHILS NFR BLD AUTO: 44.3 %
NRBC BLD-RTO: 0 /100 WBCS (ref 0–0)
OPIATES UR QL SCN: ABNORMAL
OXYCODONE+OXYMORPHONE UR QL SCN: ABNORMAL
OXYHGB MFR BLDV: 53.4 % (ref 45–75)
P AXIS: 44 DEGREES
P OFFSET: 206 MS
P ONSET: 155 MS
PCO2 BLDV: 42 MM HG (ref 41–51)
PCP UR QL SCN: ABNORMAL
PH BLDV: 7.35 PH (ref 7.33–7.43)
PLATELET # BLD AUTO: 122 X10*3/UL (ref 150–450)
PO2 BLDV: 35 MM HG (ref 35–45)
POTASSIUM BLDV-SCNC: 4.5 MMOL/L (ref 3.5–5.3)
POTASSIUM SERPL-SCNC: 4.3 MMOL/L (ref 3.5–5.3)
PR INTERVAL: 128 MS
PROT SERPL-MCNC: 7.6 G/DL (ref 6.4–8.2)
PROTHROMBIN TIME: 12.7 SECONDS (ref 9.8–12.8)
Q ONSET: 219 MS
QRS COUNT: 16 BEATS
QRS DURATION: 86 MS
QT INTERVAL: 348 MS
QTC CALCULATION(BAZETT): 448 MS
QTC FREDERICIA: 412 MS
R AXIS: 21 DEGREES
RBC # BLD AUTO: 4.1 X10*6/UL (ref 4–5.2)
SALICYLATES SERPL-MCNC: <3 MG/DL
SAO2 % BLDV: 54 % (ref 45–75)
SODIUM BLDV-SCNC: 139 MMOL/L (ref 136–145)
SODIUM SERPL-SCNC: 137 MMOL/L (ref 136–145)
T AXIS: 48 DEGREES
T OFFSET: 393 MS
TSH SERPL-ACNC: 1.97 MIU/L (ref 0.44–3.98)
VENTRICULAR RATE: 100 BPM
WBC # BLD AUTO: 6.3 X10*3/UL (ref 4.4–11.3)

## 2025-01-30 PROCEDURE — 99285 EMERGENCY DEPT VISIT HI MDM: CPT | Performed by: PHYSICIAN ASSISTANT

## 2025-01-30 PROCEDURE — 85610 PROTHROMBIN TIME: CPT | Performed by: PHYSICIAN ASSISTANT

## 2025-01-30 PROCEDURE — 82248 BILIRUBIN DIRECT: CPT | Performed by: PHYSICIAN ASSISTANT

## 2025-01-30 PROCEDURE — 83690 ASSAY OF LIPASE: CPT | Performed by: PHYSICIAN ASSISTANT

## 2025-01-30 PROCEDURE — 85025 COMPLETE CBC W/AUTO DIFF WBC: CPT | Performed by: PHYSICIAN ASSISTANT

## 2025-01-30 PROCEDURE — 84443 ASSAY THYROID STIM HORMONE: CPT | Performed by: PHYSICIAN ASSISTANT

## 2025-01-30 PROCEDURE — 80320 DRUG SCREEN QUANTALCOHOLS: CPT | Performed by: PHYSICIAN ASSISTANT

## 2025-01-30 PROCEDURE — 36415 COLL VENOUS BLD VENIPUNCTURE: CPT | Performed by: PHYSICIAN ASSISTANT

## 2025-01-30 PROCEDURE — 82810 BLOOD GASES O2 SAT ONLY: CPT | Mod: CCI | Performed by: PHYSICIAN ASSISTANT

## 2025-01-30 PROCEDURE — 84295 ASSAY OF SERUM SODIUM: CPT | Mod: 59 | Performed by: PHYSICIAN ASSISTANT

## 2025-01-30 PROCEDURE — 93005 ELECTROCARDIOGRAM TRACING: CPT

## 2025-01-30 PROCEDURE — 80307 DRUG TEST PRSMV CHEM ANLYZR: CPT | Performed by: PHYSICIAN ASSISTANT

## 2025-01-30 PROCEDURE — 2500000001 HC RX 250 WO HCPCS SELF ADMINISTERED DRUGS (ALT 637 FOR MEDICARE OP): Mod: SE | Performed by: PHYSICIAN ASSISTANT

## 2025-01-30 PROCEDURE — 99284 EMERGENCY DEPT VISIT MOD MDM: CPT | Performed by: EMERGENCY MEDICINE

## 2025-01-30 PROCEDURE — 83880 ASSAY OF NATRIURETIC PEPTIDE: CPT | Performed by: PHYSICIAN ASSISTANT

## 2025-01-30 PROCEDURE — 84484 ASSAY OF TROPONIN QUANT: CPT | Performed by: PHYSICIAN ASSISTANT

## 2025-01-30 RX ORDER — IBUPROFEN 600 MG/1
600 TABLET ORAL ONCE
Status: COMPLETED | OUTPATIENT
Start: 2025-01-30 | End: 2025-01-30

## 2025-01-30 RX ADMIN — IBUPROFEN 600 MG: 600 TABLET, FILM COATED ORAL at 14:09

## 2025-01-30 NOTE — ED TRIAGE NOTES
"Patient comes in stating \"I don't feel right\"; endorsing SOB, cough lightheadedness, \"like I could faint\", and HA; denies NV; denies recent sick contacts; patient states she was diagnosed with a PE about 5 months ago, was never hospitalized for it, and is not currently on anticoagulation;     While in triage patient has random outbursts of screaming at staff; her daughter says she has been doing this as of recently; patient's daughter also states she has been passively endorsing SI at home, patient is currently denying    Hx cirrhosis, schizophrenia  "

## 2025-01-30 NOTE — ED PROVIDER NOTES
"Emergency Department Encounter  [facility] EMERGENCY MEDICINE    Patient: Yolie Moreno  MRN: [id number]  : 1960  Date of Evaluation: 2025  ED Provider: Nery Vallecillo PA-C        History of Present Illness     This is a 64-year-old female with past medical history of reported PE not on anticoagulation, hepatitis C with liver cirrhosis requiring biweekly paracentesis (most recent was 25), as well as schizophrenia who presents to the ED with shortness of breath and dry cough.  She said this has been occurring for the past 2 days.  She denies any productive cough.  She denies any chest pain.  She denies any syncopal episodes.  She denies any abdominal pain, nausea, vomiting, or diarrhea.  She denies any fevers or chills.  She does endorse generalized bodyaches and generalized malaise.  She also endorses a mild headache.  She denies any visual changes, paresthesias, unilateral weakness or areas of decrease sensation.  Patient denies any suicidal ideation at this time, specifically she denies any plan to harm herself.  She denies any homicidal ideation, visual or auditory hallucinations.  When asked about the passive SI noted in the triage note patient states that she is \"just tired\".  She denies any intention of harming herself and just states that she understands she has a poor prognosis due to her significant medical problems.      History provided by:  Patient   used: No             Visit Vitals  /78   Pulse (!) 103   Temp 36.9 °C (98.4 °F) (Oral)   Resp 18   Ht 1.702 m (5' 7\")   Wt 69.4 kg (153 lb)   SpO2 96%   BMI 23.96 kg/m²   OB Status Postmenopausal   Smoking Status Never   BSA 1.81 m²          Physical Exam       Triage vitals:  T 36.9 °C (98.4 °F)  HR (!) 103  /78  RR 18  O2 96 % None (Room air)    Physical Exam     Physical exam:   General: Vitals noted, no distress. Afebrile.   EENT:  Hearing grossly intact. Normal phonation. MMM. " Airway patient. PERRL. EOMI.   Neck: No midline tenderness or paraspinal tenderness. FROM.   Cardiac: Regular, rate, rhythm. Normal S1 and S2.  No murmurs, gallops, rubs.   Pulmonary: Good air exchange. Lungs clear bilaterally. No wheezes, rhonchi, rales. No accessory muscle use.   Abdomen: Mildly distended, nontender, no rebound or guarding.  No discoloration or ecchymosis.  Nonsurgical.  No peritoneal signs.   Back: No CVA tenderness. No midline tenderness or paraspinal tenderness. No obvious deformity or step off.   Extremities: No peripheral edema.  Full range of motion. Moves all extremities freely. No tenderness throughout extremities.   Skin: No rash. Warm and Dry.   Neuro: No focal neurologic deficits. CN 2-12 grossly intact. Sensation equal bilaterally. No weakness.       Results       Labs Reviewed   CBC WITH AUTO DIFFERENTIAL - Abnormal       Result Value    WBC 6.3      nRBC 0.0      RBC 4.10      Hemoglobin 11.5 (*)     Hematocrit 34.1 (*)     MCV 83      MCH 28.0      MCHC 33.7      RDW 15.4 (*)     Platelets 122 (*)     Neutrophils % 44.3      Immature Granulocytes %, Automated 0.2      Lymphocytes % 25.2      Monocytes % 22.5      Eosinophils % 6.2      Basophils % 1.6      Neutrophils Absolute 2.81      Immature Granulocytes Absolute, Automated 0.01      Lymphocytes Absolute 1.59      Monocytes Absolute 1.42 (*)     Eosinophils Absolute 0.39      Basophils Absolute 0.10     COMPREHENSIVE METABOLIC PANEL - Abnormal    Glucose 97      Sodium 137      Potassium 4.3      Chloride 107      Bicarbonate 23      Anion Gap 11      Urea Nitrogen 9      Creatinine 0.79      eGFR 85      Calcium 8.6      Albumin 3.6      Alkaline Phosphatase 194 (*)     Total Protein 7.6       (*)     Bilirubin, Total 1.4 (*)     ALT 53 (*)    LIPASE - Abnormal    Lipase 217 (*)     Narrative:     Venipuncture immediately after or during the administration of Metamizole may lead to falsely low results. Testing should be  performed immediately prior to Metamizole dosing.   BILIRUBIN, DIRECT - Abnormal    Bilirubin, Direct 0.4 (*)    BLOOD GAS VENOUS FULL PANEL - Abnormal    POCT pH, Venous 7.35      POCT pCO2, Venous 42      POCT pO2, Venous 35      POCT SO2, Venous 54      POCT Oxy Hemoglobin, Venous 53.4      POCT Hematocrit Calculated, Venous 35.0 (*)     POCT Sodium, Venous 139      POCT Potassium, Venous 4.5      POCT Chloride, Venous 109 (*)     POCT Ionized Calicum, Venous 1.07 (*)     POCT Glucose, Venous 102 (*)     POCT Lactate, Venous 1.7      POCT Base Excess, Venous -2.4 (*)     POCT HCO3 Calculated, Venous 23.2      POCT Hemoglobin, Venous 11.8 (*)     POCT Anion Gap, Venous 11.0      Patient Temperature 37.0      FiO2 21     ACUTE TOXICOLOGY PANEL, BLOOD - Abnormal    Acetaminophen <10.0      Salicylate  <3      Alcohol 314 (*)    DRUG SCREEN,URINE - Abnormal    Amphetamine Screen, Urine Presumptive Negative      Barbiturate Screen, Urine Presumptive Negative      Benzodiazepines Screen, Urine Presumptive Negative      Cannabinoid Screen, Urine Presumptive Negative      Cocaine Metabolite Screen, Urine Presumptive Positive (*)     Fentanyl Screen, Urine Presumptive Negative      Opiate Screen, Urine Presumptive Negative      Oxycodone Screen, Urine Presumptive Negative      PCP Screen, Urine Presumptive Negative      Methadone Screen, Urine Presumptive Negative      Narrative:     Drug screen results are presumptive and should not be used to assess   compliance with prescribed medication. Contact the performing [facility] laboratory   to add-on definitive confirmatory testing if clinically indicated.    Toxicology screening results are reported qualitatively. The concentration must   be greater than or equal to the cutoff to be reported as positive. The concentration   at which the screening test can detect an individual drug or metabolite varies.   The absence of expected drug(s) and/or drug metabolite(s) may indicate  non-compliance,   inappropriate timing of specimen collection relative to drug administration, poor drug   absorption, diluted/adulterated urine, or limitations of testing. For medical purposes   only; not valid for forensic use.    Interpretive questions should be directed to the laboratory medical directors.   COAGULATION SCREEN - Normal    Protime 12.7      INR 1.1      aPTT 30      Narrative:     The APTT is no longer used for monitoring Unfractionated Heparin Therapy. For monitoring Heparin Therapy, use the Heparin Assay.   TROPONIN I, HIGH SENSITIVITY - Normal    Troponin I, High Sensitivity (CMC) 6      Narrative:     Less than 99th percentile of normal range cutoff-  Female and children under 18 years old <35 ng/L; Male <54 ng/L: Negative  Repeat testing should be performed if clinically indicated.     Female and children under 18 years old  ng/L; Male  ng/L:  Consistent with possible cardiac damage and possible increased clinical   risk. Serial measurements may help to assess extent of myocardial damage.     >120 ng/L: Consistent with cardiac damage, increased clinical risk and  myocardial infarction. Serial measurements may help assess extent of   myocardial damage.      NOTE: Children less than 1 year old may have higher baseline troponin   levels and results should be interpreted in conjunction with the overall   clinical context.    NOTE: Troponin I testing is performed using a different   testing methodology at [facility] than at other   [facility]. Direct result comparisons should only   be made within the same method.     B-TYPE NATRIURETIC PEPTIDE - Normal    BNP 12      Narrative:        <100 pg/mL - Heart failure unlikely  100-299 pg/mL - Intermediate probability of acute heart                  failure exacerbation. Correlate with clinical                  context and patient history.    >=300 pg/mL - Heart Failure likely. Correlate with clinical                   context and patient history.     Biotin interference may cause falsely decreased results. Patients taking a Biotin dose of up to 5 mg/day should refrain from taking Biotin for 24 hours before sample  collection. Providers may contact their local laboratory for further information.   TSH WITH REFLEX TO FREE T4 IF ABNORMAL - Normal    Thyroid Stimulating Hormone 1.97      Narrative:     TSH testing is performed using different testing methodology at [facility] than at other [facility]. Direct result comparisons should only be made within the same method.     SARS-COV-2 AND INFLUENZA A/B PCR   RSV PCR       No orders to display         Medical Decision Making & ED Course         ED Course & MDM     Medical Decision Making  This is a 64-year-old female with past medical history of reported PE not on anticoagulation, liver cirrhosis as well as schizophrenia who presents to the ED for shortness of breath and cough.  Vitals did show she was slightly tachycardic 103 bpm, she was not hypoxic with her SpO2 being 96% on room air.  On examination lungs clear to auscultation.  No audible cardiac murmurs.  She was able to speak in full sentences without difficulty.  Abdomen was distended but was nontender, is consistent with the patient's known liver cirrhosis with associated ascites.  Patient denies any suicidal ideation at this time, specifically denies any plan to harm herself.  She denies any HI or any visual/auditory hallucinations.  Patient does demonstrate capacity at this point in time.  Laboratory studies ordered.  Patient medicated ibuprofen.  Prior to any laboratory study results coming back as well as prior to getting a CT PE study patient elected to leave AGAINST MEDICAL ADVICE.  She was able to voice the risk of leaving at this point in time.  She provided me with the phone number [phone] to call her back with any abnormal results on her laboratory studies.  Patient left the ED in stable  condition AGAINST MEDICAL ADVICE.         Diagnoses as of 01/30/25 1709   Shortness of breath          Social Determinants of Health which Significantly Impact Care: Mental health disorder     EKG Independent Interpretation:  EKG normal sinus rhythm at 100 bpm without any acute ST elevation or depression.  No T wave inversions.  Normal axis.  QT/QTc 348/448      Disposition   The patient elected to leave the Emergency Department against medical advice. In my opinion, the patient has capacity to leave AMA. she is clinically sober, free from distracting injury, appears to have intact insight, judgment, and reason, and in my opinion has capacity to make decisions. I explained to her that these symptoms may represent a serious underlying medical condition and she verbalized understanding of my concerns and understands the consequences of leaving without complete evaluation.    I had a discussion with the patient about her workup and results, and informed her what the next step in diagnosis and treatment would be, and she verbalized understanding. I explained the risks of leaving without further workup or treatment, which included reasonably foreseeable complications such as death, serious injury, and permanent disability. I also offered alternatives to departing AMA.    I have asked her to return as soon as possible to complete her evaluation, and also explained that she is welcome to return to the ED for further evaluation whenever she chooses. I have asked her to follow up with her primary doctor as soon as possible. All of her questions were answered and she was given oral discharge instructions.    Procedures     This was a shared visit with an ED attending.  The patient was seen and discussed with the ED attending    Procedures    Nery Vallecillo PA-C  Emergency Medicine      Nery Vallecillo PA-C  01/30/25 1703

## 2025-02-04 ENCOUNTER — CLINICAL SUPPORT (OUTPATIENT)
Dept: NUTRITION | Facility: HOSPITAL | Age: 65
End: 2025-02-04
Payer: COMMERCIAL

## 2025-02-04 NOTE — PROGRESS NOTES
[employer]  Diet Recommendation 1: Healthy Eating  Food Intolerance Avoidance: NKFA  Household Size: 3 Family Members  Interventions: Referral Number: 4th 6 Mo Referral 2 yrs (Referrals may not be consecutive)  Interventions: Visit Number: 3 of 6 Visits - Max 6 Visits/Referral Each 6 Mo Period  Grains: 50-75% Whole  Fruit: % Fresh  Vegetables: % Fresh  Proteins: 1-2 Plant-based Items  Dairy: 25-50% Lowfat  Originating Site of Referral Order: KEERTHI Bustos  Initials of RD Assisting Today: ORESTES

## 2025-02-10 ENCOUNTER — HOSPITAL ENCOUNTER (EMERGENCY)
Facility: HOSPITAL | Age: 65
Discharge: HOME | End: 2025-02-10
Attending: EMERGENCY MEDICINE
Payer: COMMERCIAL

## 2025-02-10 ENCOUNTER — HOSPITAL ENCOUNTER (OUTPATIENT)
Dept: RADIOLOGY | Facility: HOSPITAL | Age: 65
Discharge: HOME | End: 2025-02-10
Payer: COMMERCIAL

## 2025-02-10 VITALS — HEART RATE: 93 BPM | OXYGEN SATURATION: 97 % | DIASTOLIC BLOOD PRESSURE: 75 MMHG | SYSTOLIC BLOOD PRESSURE: 113 MMHG

## 2025-02-10 VITALS
SYSTOLIC BLOOD PRESSURE: 111 MMHG | OXYGEN SATURATION: 99 % | TEMPERATURE: 98.1 F | BODY MASS INDEX: 23.3 KG/M2 | RESPIRATION RATE: 16 BRPM | WEIGHT: 145 LBS | HEIGHT: 66 IN | DIASTOLIC BLOOD PRESSURE: 64 MMHG | HEART RATE: 87 BPM

## 2025-02-10 DIAGNOSIS — R18.8 OTHER ASCITES: Primary | ICD-10-CM

## 2025-02-10 DIAGNOSIS — K70.31 ALCOHOLIC CIRRHOSIS OF LIVER WITH ASCITES (MULTI): ICD-10-CM

## 2025-02-10 PROCEDURE — 99283 EMERGENCY DEPT VISIT LOW MDM: CPT | Mod: 25 | Performed by: EMERGENCY MEDICINE

## 2025-02-10 PROCEDURE — 2500000004 HC RX 250 GENERAL PHARMACY W/ HCPCS (ALT 636 FOR OP/ED): Mod: SE | Performed by: NURSE PRACTITIONER

## 2025-02-10 PROCEDURE — P9047 ALBUMIN (HUMAN), 25%, 50ML: HCPCS | Mod: SE | Performed by: NURSE PRACTITIONER

## 2025-02-10 PROCEDURE — 49083 ABD PARACENTESIS W/IMAGING: CPT

## 2025-02-10 RX ORDER — ALBUMIN HUMAN 250 G/1000ML
25 SOLUTION INTRAVENOUS ONCE
Status: DISCONTINUED | OUTPATIENT
Start: 2025-02-10 | End: 2025-02-11 | Stop reason: HOSPADM

## 2025-02-10 RX ORDER — ALBUMIN HUMAN 250 G/1000ML
25 SOLUTION INTRAVENOUS ONCE
Status: COMPLETED | OUTPATIENT
Start: 2025-02-10 | End: 2025-02-10

## 2025-02-10 RX ADMIN — ALBUMIN HUMAN 25 G: 0.25 SOLUTION INTRAVENOUS at 13:00

## 2025-02-10 ASSESSMENT — PAIN - FUNCTIONAL ASSESSMENT: PAIN_FUNCTIONAL_ASSESSMENT: 0-10

## 2025-02-10 ASSESSMENT — LIFESTYLE VARIABLES
HAVE PEOPLE ANNOYED YOU BY CRITICIZING YOUR DRINKING: NO
EVER FELT BAD OR GUILTY ABOUT YOUR DRINKING: NO
TOTAL SCORE: 1
HAVE YOU EVER FELT YOU SHOULD CUT DOWN ON YOUR DRINKING: NO
EVER HAD A DRINK FIRST THING IN THE MORNING TO STEADY YOUR NERVES TO GET RID OF A HANGOVER: YES

## 2025-02-10 ASSESSMENT — PAIN SCALES - GENERAL: PAINLEVEL_OUTOF10: 3

## 2025-02-10 NOTE — ED PROVIDER NOTES
Emergency Department Provider Note        History of Present Illness     History provided by: Patient  Limitations to History: None  External Records Reviewed with Brief Summary:  Procedure documentation on 1/27 for paracentesis.    HPI:  Yolie Moreno is a 64 y.o. female With PMH of alcohol abuse, alcoholic cirrhosis, bipolar type II, chronic hep C, esophageal varices who presents to the emergency department for paracentesis.  Patient normally gets a paracentesis done biweekly.  Her last procedure was 2 weeks ago (1/27) where they drained out 5 L of fluid.  Patient states he normally drain off 10 L.  She was post have an appointment today but her ride was unable to pick her up so she called EMS to bring her in.  Patient denies any chest pain or shortness of breath.  She does feel distended and full in her abdomen.  Patient's had a nonproductive cough for the last week but denies fevers, chills, nasal congestion, rhinorrhea, sore throat or other illness sick like symptoms.  Patient is still drinking, last drink was yesterday.      Physical Exam   Triage vitals:  T 36.7 °C (98.1 °F)  HR 87  /64  RR 16  O2 99 % None (Room air)    Physical Exam  Vitals and nursing note reviewed.   Constitutional:       General: She is not in acute distress.     Appearance: She is well-developed.   HENT:      Head: Normocephalic and atraumatic.   Eyes:      General: Scleral icterus present.   Cardiovascular:      Rate and Rhythm: Normal rate and regular rhythm.      Pulses: Normal pulses.      Heart sounds: No murmur heard.  Pulmonary:      Effort: Pulmonary effort is normal. No respiratory distress.      Breath sounds: Normal breath sounds.   Abdominal:      General: There is distension.      Palpations: Abdomen is soft. There is fluid wave.      Tenderness: There is no abdominal tenderness.      Hernia: No hernia is present.      Comments: Ascites   Musculoskeletal:         General: Swelling (Nonpitting bilateral lower  extremity) present.   Skin:     General: Skin is warm and dry.   Neurological:      General: No focal deficit present.      Mental Status: She is alert and oriented to person, place, and time.          Medical Decision Making & ED Course   Medical Decision Making:  Yolie Moreno is a 64 y.o. female With PMH of alcohol abuse, alcoholic cirrhosis, bipolar type II, chronic hep C, esophageal varices who presents to the emergency department for paracentesis.  Patient is distended but abdomen is nontender on examination.  It is her standard pain with distention.  No concern for SBP or systemic signs of infection at this time.    Independent Result Review and Interpretation: None obtained    Chronic conditions affecting the patient's care: As documented above in [facility]    The patient was discussed with the following consultants/services:  Discussed with the team and charge patient's paracentesis.  Her appointment was actually scheduled for 10 AM.  They were able to get her in today.    Care Considerations: As documented above in [facility]    ED Course:  ED Course as of 02/10/25 1030   Mon Feb 10, 2025   1019 Pt without fevers.  Eating and drinking at baseline.  AP is at baseline for when she is due for a tap.  Pt is only in the ED because her ride cancelled and she couldn't get to the hospital for her 10 am tap.  I contacted the NP that usually cares for her.  They recommend that we discharge her to their clinic.  Pt is comfortable with plan. [YT]      ED Course User Index  [YT] Christa Ocampo MD         Diagnoses as of 02/10/25 1030   Other ascites     Disposition   As a result of the work-up, the patient was discharged home.  she was informed of her diagnosis and instructed to come back with any concerns or worsening of condition.  she and was agreeable to the plan as discussed above.  she was given the opportunity to ask questions.  All of the patient's questions were answered.    Procedures   Procedures    Patient seen and  discussed with ED attending physician.    Tamar Hernandez DO  Emergency Medicine     Tamar Hernandez DO  Resident  02/10/25 4623

## 2025-02-10 NOTE — POST-PROCEDURE NOTE
INTERVENTIONAL RADIOLOGY ADVANCED PRACTICE PROCEDURE  [facility]    A time out was performed and Right Hemiabdomen was examined with US and appropriate entry point was confirmed and marked.   The patient was prepped and draped in a sterile manner, 1% lidocaine was used to anesthesize the skin and subcutaneous tissue.   A 5F Centesis needle was then introduced through the skin into the peritoneal space, the centesis catheter was then threaded without difficulty.   5100 ml of yellow fluid was removed without difficulty. The catheter was then removed.   No immediate complications were noted during and immediately following the procedure.

## 2025-02-10 NOTE — ED TRIAGE NOTES
Pt has hx of cirrhosis and gets a paracentesis every 2 weeks. States they typically drain between 5 and 10 L from her abdomen. Is due to get drained today, but her ride fell through. Pt having no acute complaints at this time. Endorses daily alcohol use, smells of ETOH at this time

## 2025-02-24 ENCOUNTER — HOSPITAL ENCOUNTER (OUTPATIENT)
Dept: RADIOLOGY | Facility: HOSPITAL | Age: 65
Discharge: HOME | End: 2025-02-24
Payer: COMMERCIAL

## 2025-02-24 VITALS — HEART RATE: 63 BPM | OXYGEN SATURATION: 95 % | DIASTOLIC BLOOD PRESSURE: 77 MMHG | SYSTOLIC BLOOD PRESSURE: 113 MMHG

## 2025-02-24 DIAGNOSIS — K70.31 ALCOHOLIC CIRRHOSIS OF LIVER WITH ASCITES (MULTI): ICD-10-CM

## 2025-02-24 PROCEDURE — 49083 ABD PARACENTESIS W/IMAGING: CPT

## 2025-02-24 RX ORDER — KETOROLAC TROMETHAMINE 30 MG/ML
30 INJECTION, SOLUTION INTRAMUSCULAR; INTRAVENOUS ONCE
Status: DISCONTINUED | OUTPATIENT
Start: 2025-02-24 | End: 2025-02-25 | Stop reason: HOSPADM

## 2025-02-24 RX ORDER — ALBUMIN HUMAN 250 G/1000ML
50 SOLUTION INTRAVENOUS ONCE
Status: DISCONTINUED | OUTPATIENT
Start: 2025-02-24 | End: 2025-02-25 | Stop reason: HOSPADM

## 2025-02-24 NOTE — POST-PROCEDURE NOTE
INTERVENTIONAL RADIOLOGY ADVANCED PRACTICE PROCEDURE  [facility]    A time out was performed and Left Hemiabdomen was examined with US and appropriate entry point was confirmed and marked.   The patient was prepped and draped in a sterile manner, 1% lidocaine was used to anesthesize the skin and subcutaneous tissue.   A 5F Centesis needle was then introduced through the skin into the peritoneal space, the centesis catheter was then threaded without difficulty.   8500 ml of yellow fluid was removed without difficulty. The catheter was then removed.   No immediate complications were noted during and immediately following the procedure.

## 2025-03-10 ENCOUNTER — HOSPITAL ENCOUNTER (OUTPATIENT)
Dept: RADIOLOGY | Facility: HOSPITAL | Age: 65
Discharge: HOME | End: 2025-03-10
Payer: COMMERCIAL

## 2025-03-10 VITALS — OXYGEN SATURATION: 98 % | DIASTOLIC BLOOD PRESSURE: 79 MMHG | HEART RATE: 106 BPM | SYSTOLIC BLOOD PRESSURE: 119 MMHG

## 2025-03-10 DIAGNOSIS — K70.31 ALCOHOLIC CIRRHOSIS OF LIVER WITH ASCITES (MULTI): ICD-10-CM

## 2025-03-10 PROCEDURE — P9047 ALBUMIN (HUMAN), 25%, 50ML: HCPCS | Mod: SE | Performed by: NURSE PRACTITIONER

## 2025-03-10 PROCEDURE — 49083 ABD PARACENTESIS W/IMAGING: CPT

## 2025-03-10 PROCEDURE — 2500000004 HC RX 250 GENERAL PHARMACY W/ HCPCS (ALT 636 FOR OP/ED): Mod: SE | Performed by: NURSE PRACTITIONER

## 2025-03-10 RX ORDER — ALBUMIN HUMAN 250 G/1000ML
50 SOLUTION INTRAVENOUS ONCE
Status: COMPLETED | OUTPATIENT
Start: 2025-03-10 | End: 2025-03-10

## 2025-03-10 RX ADMIN — ALBUMIN HUMAN 50 G: 0.25 SOLUTION INTRAVENOUS at 10:37

## 2025-03-10 NOTE — POST-PROCEDURE NOTE
INTERVENTIONAL RADIOLOGY ADVANCED PRACTICE PROCEDURE  [facility]    A time out was performed and Left Hemiabdomen was examined with US and appropriate entry point was confirmed and marked.   The patient was prepped and draped in a sterile manner, 1% lidocaine was used to anesthesize the skin and subcutaneous tissue.   A 5F Centesis needle was then introduced through the skin into the peritoneal space, the centesis catheter was then threaded without difficulty.   9125 ml of yellow fluid was removed without difficulty. The catheter was then removed.   No immediate complications were noted during and immediately following the procedure.

## 2025-03-11 ENCOUNTER — CLINICAL SUPPORT (OUTPATIENT)
Facility: HOSPITAL | Age: 65
End: 2025-03-11
Payer: COMMERCIAL

## 2025-03-11 NOTE — PROGRESS NOTES
[employer]  Diet Recommendation 1: Healthy Eating  Food Intolerance Avoidance: NKFA  Household Size: 3 Family Members  Interventions: Referral Number: 4th 6 Mo Referral 2 yrs (Referrals may not be consecutive)  Interventions: Visit Number: 4 of 6 Visits - Max 6 Visits/Referral Each 6 Mo Period  Grains: 25-50% Whole  Fruit: 25-50% Fresh  Vegetables: 50-75% Fresh  Proteins: 1-2 Plant-based Items  Dairy: 25-50% Lowfat  Originating Site of Referral Order: KEERTHI Bustos  Initials of RD Assisting Today: ORESTES

## 2025-03-20 ENCOUNTER — APPOINTMENT (OUTPATIENT)
Dept: RADIOLOGY | Facility: HOSPITAL | Age: 65
End: 2025-03-20
Payer: COMMERCIAL

## 2025-03-24 ENCOUNTER — APPOINTMENT (OUTPATIENT)
Dept: RADIOLOGY | Facility: HOSPITAL | Age: 65
End: 2025-03-24
Payer: COMMERCIAL

## 2025-03-24 ENCOUNTER — HOSPITAL ENCOUNTER (OUTPATIENT)
Dept: RADIOLOGY | Facility: HOSPITAL | Age: 65
Discharge: HOME | End: 2025-03-24
Payer: COMMERCIAL

## 2025-03-24 VITALS — DIASTOLIC BLOOD PRESSURE: 87 MMHG | HEART RATE: 89 BPM | OXYGEN SATURATION: 98 % | SYSTOLIC BLOOD PRESSURE: 137 MMHG

## 2025-03-24 DIAGNOSIS — K70.31 ALCOHOLIC CIRRHOSIS OF LIVER WITH ASCITES (MULTI): ICD-10-CM

## 2025-03-24 PROCEDURE — 49083 ABD PARACENTESIS W/IMAGING: CPT

## 2025-03-24 PROCEDURE — P9047 ALBUMIN (HUMAN), 25%, 50ML: HCPCS | Mod: SE | Performed by: NURSE PRACTITIONER

## 2025-03-24 PROCEDURE — 2500000004 HC RX 250 GENERAL PHARMACY W/ HCPCS (ALT 636 FOR OP/ED): Mod: SE | Performed by: NURSE PRACTITIONER

## 2025-03-24 RX ORDER — ALBUMIN HUMAN 250 G/1000ML
50 SOLUTION INTRAVENOUS ONCE
Status: COMPLETED | OUTPATIENT
Start: 2025-03-24 | End: 2025-03-24

## 2025-03-24 RX ADMIN — ALBUMIN HUMAN 50 G: 0.25 SOLUTION INTRAVENOUS at 10:45

## 2025-03-24 NOTE — POST-PROCEDURE NOTE
INTERVENTIONAL RADIOLOGY ADVANCED PRACTICE PROCEDURE  [facility]    A time out was performed and Left Hemiabdomen was examined with US and appropriate entry point was confirmed and marked.   The patient was prepped and draped in a sterile manner, 1% lidocaine was used to anesthesize the skin and subcutaneous tissue.   A 5F Centesis needle was then introduced through the skin into the peritoneal space, the centesis catheter was then threaded without difficulty.   8200 ml of yellow fluid was removed without difficulty. The catheter was then removed.   No immediate complications were noted during and immediately following the procedure.

## 2025-04-07 ENCOUNTER — HOSPITAL ENCOUNTER (OUTPATIENT)
Dept: RADIOLOGY | Facility: HOSPITAL | Age: 65
Discharge: HOME | End: 2025-04-07
Payer: COMMERCIAL

## 2025-04-07 VITALS
OXYGEN SATURATION: 100 % | HEART RATE: 115 BPM | DIASTOLIC BLOOD PRESSURE: 87 MMHG | RESPIRATION RATE: 18 BRPM | SYSTOLIC BLOOD PRESSURE: 125 MMHG

## 2025-04-07 DIAGNOSIS — K70.31 ALCOHOLIC CIRRHOSIS OF LIVER WITH ASCITES (MULTI): ICD-10-CM

## 2025-04-07 PROCEDURE — 49083 ABD PARACENTESIS W/IMAGING: CPT

## 2025-04-07 PROCEDURE — 2500000004 HC RX 250 GENERAL PHARMACY W/ HCPCS (ALT 636 FOR OP/ED): Mod: SE | Performed by: PHYSICIAN ASSISTANT

## 2025-04-07 PROCEDURE — P9047 ALBUMIN (HUMAN), 25%, 50ML: HCPCS | Mod: SE | Performed by: PHYSICIAN ASSISTANT

## 2025-04-07 RX ORDER — ALBUMIN HUMAN 250 G/1000ML
50 SOLUTION INTRAVENOUS ONCE
Status: COMPLETED | OUTPATIENT
Start: 2025-04-07 | End: 2025-04-07

## 2025-04-07 RX ADMIN — ALBUMIN HUMAN 50 G: 0.25 SOLUTION INTRAVENOUS at 10:47

## 2025-04-07 NOTE — POST-PROCEDURE NOTE
INTERVENTIONAL RADIOLOGY ADVANCED PRACTICE PROCEDURE  [facility]    A time out was performed and Left Hemiabdomen was examined with US and appropriate entry point was confirmed and marked.   The patient was prepped and draped in a sterile manner, 1% lidocaine was used to anesthesize the skin and subcutaneous tissue.   A 5F Centesis needle was then introduced through the skin into the peritoneal space, the centesis catheter was then threaded without difficulty.   8000 ml of yellow fluid was removed without difficulty. The catheter was then removed.   No immediate complications were noted during and immediately following the procedure.

## 2025-04-11 ENCOUNTER — CLINICAL SUPPORT (OUTPATIENT)
Facility: HOSPITAL | Age: 65
End: 2025-04-11
Payer: COMMERCIAL

## 2025-04-11 NOTE — PROGRESS NOTES
[employer]  Diet Recommendation 1: Healthy Eating  Diet Recommendation 2: Protein, High  Diet Recommendation 3: Healthy Eating  Food Intolerance Avoidance: NKFA  Household Size: 3 Family Members  Interventions: Referral Number: 4th 6 Mo Referral 2 yrs (Referrals may not be consecutive)  Interventions: Visit Number: 5 of 6 Visits - Max 6 Visits/Referral Each 6 Mo Period  Grains: 25-50% Whole  Fruit: 25-50% Fresh  Vegetables: 50-75% Fresh  Proteins: 1-2 Plant-based Items  Dairy: Lowfat - 100%  Originating Site of Referral Order: KEERTHI Bustos  Initials of RD Assisting Today: SB

## 2025-04-21 ENCOUNTER — HOSPITAL ENCOUNTER (OUTPATIENT)
Dept: RADIOLOGY | Facility: HOSPITAL | Age: 65
Discharge: HOME | End: 2025-04-21
Payer: COMMERCIAL

## 2025-04-21 DIAGNOSIS — K70.31 ALCOHOLIC CIRRHOSIS OF LIVER WITH ASCITES (MULTI): ICD-10-CM

## 2025-04-21 PROCEDURE — 2500000004 HC RX 250 GENERAL PHARMACY W/ HCPCS (ALT 636 FOR OP/ED): Mod: SE | Performed by: NURSE PRACTITIONER

## 2025-04-21 PROCEDURE — 49083 ABD PARACENTESIS W/IMAGING: CPT

## 2025-04-21 PROCEDURE — P9047 ALBUMIN (HUMAN), 25%, 50ML: HCPCS | Mod: SE | Performed by: NURSE PRACTITIONER

## 2025-04-21 RX ORDER — ALBUMIN HUMAN 250 G/1000ML
50 SOLUTION INTRAVENOUS ONCE
Status: COMPLETED | OUTPATIENT
Start: 2025-04-21 | End: 2025-04-21

## 2025-04-21 RX ADMIN — ALBUMIN HUMAN 50 G: 0.25 SOLUTION INTRAVENOUS at 10:11

## 2025-04-21 NOTE — POST-PROCEDURE NOTE
INTERVENTIONAL RADIOLOGY ADVANCED PRACTICE PROCEDURE  [facility]    A time out was performed and Left Hemiabdomen was examined with US and appropriate entry point was confirmed and marked.   The patient was prepped and draped in a sterile manner, 1% lidocaine was used to anesthesize the skin and subcutaneous tissue.   A 5F Centesis needle was then introduced through the skin into the peritoneal space, the centesis catheter was then threaded without difficulty.   9000 ml of yellow fluid was removed without difficulty. The catheter was then removed.   No immediate complications were noted during and immediately following the procedure.

## 2025-04-23 ENCOUNTER — APPOINTMENT (OUTPATIENT)
Facility: HOSPITAL | Age: 65
End: 2025-04-23
Payer: COMMERCIAL

## 2025-05-05 ENCOUNTER — HOSPITAL ENCOUNTER (OUTPATIENT)
Dept: RADIOLOGY | Facility: HOSPITAL | Age: 65
Discharge: HOME | End: 2025-05-05
Payer: COMMERCIAL

## 2025-05-05 VITALS — DIASTOLIC BLOOD PRESSURE: 81 MMHG | OXYGEN SATURATION: 97 % | HEART RATE: 108 BPM | SYSTOLIC BLOOD PRESSURE: 129 MMHG

## 2025-05-05 DIAGNOSIS — K70.31 ALCOHOLIC CIRRHOSIS OF LIVER WITH ASCITES (MULTI): ICD-10-CM

## 2025-05-05 DIAGNOSIS — R18.8 OTHER ASCITES: Primary | ICD-10-CM

## 2025-05-05 PROCEDURE — P9047 ALBUMIN (HUMAN), 25%, 50ML: HCPCS | Mod: SE | Performed by: PHYSICIAN ASSISTANT

## 2025-05-05 PROCEDURE — 49083 ABD PARACENTESIS W/IMAGING: CPT

## 2025-05-05 PROCEDURE — 2500000004 HC RX 250 GENERAL PHARMACY W/ HCPCS (ALT 636 FOR OP/ED): Mod: SE | Performed by: PHYSICIAN ASSISTANT

## 2025-05-05 RX ORDER — ALBUMIN HUMAN 250 G/1000ML
50 SOLUTION INTRAVENOUS ONCE
Status: COMPLETED | OUTPATIENT
Start: 2025-05-05 | End: 2025-05-05

## 2025-05-05 RX ADMIN — ALBUMIN HUMAN 50 G: 0.25 SOLUTION INTRAVENOUS at 10:35

## 2025-05-05 NOTE — POST-PROCEDURE NOTE
INTERVENTIONAL RADIOLOGY ADVANCED PRACTICE PROCEDURE  [facility]    A time out was performed and Left Hemiabdomen was examined with US and appropriate entry point was confirmed and marked.   The patient was prepped and draped in a sterile manner, 1% lidocaine was used to anesthesize the skin and subcutaneous tissue.   A 5F Centesis needle was then introduced through the skin into the peritoneal space, the centesis catheter was then threaded without difficulty.   29855 ml of yellow fluid was removed without difficulty. The catheter was then removed.   No immediate complications were noted during and immediately following the procedure.

## 2025-05-12 ENCOUNTER — HOSPITAL ENCOUNTER (OUTPATIENT)
Dept: RADIOLOGY | Facility: HOSPITAL | Age: 65
Discharge: HOME | End: 2025-05-12
Payer: COMMERCIAL

## 2025-05-12 VITALS — OXYGEN SATURATION: 97 % | DIASTOLIC BLOOD PRESSURE: 86 MMHG | HEART RATE: 109 BPM | SYSTOLIC BLOOD PRESSURE: 123 MMHG

## 2025-05-12 DIAGNOSIS — R18.8 OTHER ASCITES: ICD-10-CM

## 2025-05-12 PROCEDURE — 2500000004 HC RX 250 GENERAL PHARMACY W/ HCPCS (ALT 636 FOR OP/ED): Mod: SE | Performed by: NURSE PRACTITIONER

## 2025-05-12 PROCEDURE — P9047 ALBUMIN (HUMAN), 25%, 50ML: HCPCS | Mod: SE | Performed by: NURSE PRACTITIONER

## 2025-05-12 PROCEDURE — 49083 ABD PARACENTESIS W/IMAGING: CPT

## 2025-05-12 RX ORDER — ALBUMIN HUMAN 250 G/1000ML
50 SOLUTION INTRAVENOUS ONCE
Status: COMPLETED | OUTPATIENT
Start: 2025-05-12 | End: 2025-05-12

## 2025-05-12 RX ADMIN — ALBUMIN HUMAN 50 G: 0.25 SOLUTION INTRAVENOUS at 11:43

## 2025-05-12 NOTE — POST-PROCEDURE NOTE
INTERVENTIONAL RADIOLOGY ADVANCED PRACTICE PROCEDURE  [facility]    A time out was performed and Left Hemiabdomen was examined with US and appropriate entry point was confirmed and marked.   The patient was prepped and draped in a sterile manner, 1% lidocaine was used to anesthesize the skin and subcutaneous tissue.   A 5F Centesis needle was then introduced through the skin into the peritoneal space, the centesis catheter was then threaded without difficulty.   8100 ml of yellow fluid was removed without difficulty. The catheter was then removed.   No immediate complications were noted during and immediately following the procedure.

## 2025-05-13 ENCOUNTER — CLINICAL SUPPORT (OUTPATIENT)
Facility: HOSPITAL | Age: 65
End: 2025-05-13
Payer: COMMERCIAL

## 2025-05-13 NOTE — PROGRESS NOTES
[employer] [facility] [employer]  Diet Recommendation 1: Healthy Eating  Diet Recommendation 2: Protein, High  Food Intolerance Avoidance: NKFA  Household Size: 3 Family Members  Interventions: Referral Number: 4th 6 Mo Referral 2 yrs (Referrals may not be consecutive)  Interventions: Visit Number: 6 of 6 Visits - Max 6 Visits/Referral Each 6 Mo Period  Grains: 25-50% Whole  Fruit: 25-50% Fresh  Vegetables: 50-75% Fresh  Proteins: 1-2 Plant-based Items  Dairy: Lowfat - 100%  Originating Site of Referral Order: KEERTHI Bustos  Initials of RD Assisting Today: ORESTES

## 2025-05-19 ENCOUNTER — HOSPITAL ENCOUNTER (OUTPATIENT)
Dept: RADIOLOGY | Facility: HOSPITAL | Age: 65
Discharge: HOME | End: 2025-05-19
Payer: COMMERCIAL

## 2025-05-19 VITALS — DIASTOLIC BLOOD PRESSURE: 82 MMHG | OXYGEN SATURATION: 97 % | HEART RATE: 100 BPM | SYSTOLIC BLOOD PRESSURE: 133 MMHG

## 2025-05-19 DIAGNOSIS — K70.31 ALCOHOLIC CIRRHOSIS OF LIVER WITH ASCITES (MULTI): ICD-10-CM

## 2025-05-19 PROCEDURE — P9047 ALBUMIN (HUMAN), 25%, 50ML: HCPCS | Mod: SE | Performed by: NURSE PRACTITIONER

## 2025-05-19 PROCEDURE — 2500000004 HC RX 250 GENERAL PHARMACY W/ HCPCS (ALT 636 FOR OP/ED): Mod: SE | Performed by: NURSE PRACTITIONER

## 2025-05-19 PROCEDURE — 49083 ABD PARACENTESIS W/IMAGING: CPT

## 2025-05-19 RX ORDER — ALBUMIN HUMAN 250 G/1000ML
25 SOLUTION INTRAVENOUS ONCE
Status: COMPLETED | OUTPATIENT
Start: 2025-05-19 | End: 2025-05-19

## 2025-05-19 RX ADMIN — ALBUMIN HUMAN 25 G: 0.25 SOLUTION INTRAVENOUS at 09:58

## 2025-05-19 NOTE — POST-PROCEDURE NOTE
INTERVENTIONAL RADIOLOGY ADVANCED PRACTICE PROCEDURE  [facility]    A time out was performed and Left Hemiabdomen was examined with US and appropriate entry point was confirmed and marked.   The patient was prepped and draped in a sterile manner, 1% lidocaine was used to anesthesize the skin and subcutaneous tissue.   A 5F Centesis needle was then introduced through the skin into the peritoneal space, the centesis catheter was then threaded without difficulty.   6000 ml of yellow fluid was removed without difficulty. The catheter was then removed.   No immediate complications were noted during and immediately following the procedure.

## 2025-05-26 ENCOUNTER — APPOINTMENT (OUTPATIENT)
Dept: RADIOLOGY | Facility: HOSPITAL | Age: 65
End: 2025-05-26
Payer: COMMERCIAL

## 2025-05-27 ENCOUNTER — HOSPITAL ENCOUNTER (OUTPATIENT)
Dept: RADIOLOGY | Facility: HOSPITAL | Age: 65
Discharge: HOME | End: 2025-05-27
Payer: COMMERCIAL

## 2025-05-27 ENCOUNTER — SOCIAL WORK (OUTPATIENT)
Dept: CASE MANAGEMENT | Facility: HOSPITAL | Age: 65
End: 2025-05-27
Payer: COMMERCIAL

## 2025-05-27 VITALS — HEART RATE: 90 BPM | DIASTOLIC BLOOD PRESSURE: 76 MMHG | SYSTOLIC BLOOD PRESSURE: 108 MMHG | OXYGEN SATURATION: 98 %

## 2025-05-27 DIAGNOSIS — R18.8 OTHER ASCITES: ICD-10-CM

## 2025-05-27 DIAGNOSIS — K70.9 ALCOHOLIC LIVER DISEASE: Primary | ICD-10-CM

## 2025-05-27 PROCEDURE — 2500000004 HC RX 250 GENERAL PHARMACY W/ HCPCS (ALT 636 FOR OP/ED): Mod: SE | Performed by: PHYSICIAN ASSISTANT

## 2025-05-27 PROCEDURE — P9047 ALBUMIN (HUMAN), 25%, 50ML: HCPCS | Mod: SE | Performed by: PHYSICIAN ASSISTANT

## 2025-05-27 PROCEDURE — 49083 ABD PARACENTESIS W/IMAGING: CPT

## 2025-05-27 RX ORDER — ALBUMIN HUMAN 250 G/1000ML
50 SOLUTION INTRAVENOUS ONCE
Status: COMPLETED | OUTPATIENT
Start: 2025-05-27 | End: 2025-05-27

## 2025-05-27 RX ADMIN — ALBUMIN HUMAN 50 G: 0.25 SOLUTION INTRAVENOUS at 10:20

## 2025-05-27 NOTE — POST-PROCEDURE NOTE
INTERVENTIONAL RADIOLOGY ADVANCED PRACTICE PROCEDURE  [facility]    A time out was performed and Left Hemiabdomen was examined with US and appropriate entry point was confirmed and marked.   The patient was prepped and draped in a sterile manner, 1% lidocaine was used to anesthesize the skin and subcutaneous tissue.   A 5F Centesis needle was then introduced through the skin into the peritoneal space, the centesis catheter was then threaded without difficulty.   6000 ml of yellow fluid was removed without difficulty. The catheter was then removed.   No immediate complications were noted during and immediately following the procedure.

## 2025-05-27 NOTE — PROGRESS NOTES
Social Work Note    Referral Source: Darleen Razo  Meeting Location: In-Person  Person(s) Present: Pt  Identified Needs: Bus Ticket  Impression and Plan: SW received Epic Secure Chat from FARZANA Razo the Pt requested to speak with SW. The Pt explained to SW that she has limited resources and did not have enough money for a return bus ticket following her appt. SW provided 1 bus ticket. The Pt declined interest in further assistance and denied further needs.    Interventions Provided: Care Coordination  Estimated Time Spent: 15 min    SW will continue to follow as needed.       Patrica Minor LMSW

## 2025-06-02 ENCOUNTER — HOSPITAL ENCOUNTER (OUTPATIENT)
Dept: RADIOLOGY | Facility: HOSPITAL | Age: 65
Discharge: HOME | End: 2025-06-02
Payer: COMMERCIAL

## 2025-06-02 ENCOUNTER — SOCIAL WORK (OUTPATIENT)
Dept: HEMATOLOGY/ONCOLOGY | Facility: HOSPITAL | Age: 65
End: 2025-06-02
Payer: COMMERCIAL

## 2025-06-02 VITALS — OXYGEN SATURATION: 98 % | HEART RATE: 106 BPM | DIASTOLIC BLOOD PRESSURE: 74 MMHG | SYSTOLIC BLOOD PRESSURE: 119 MMHG

## 2025-06-02 DIAGNOSIS — R18.8 OTHER ASCITES: ICD-10-CM

## 2025-06-02 DIAGNOSIS — K70.9 ALCOHOLIC LIVER DISEASE: Primary | ICD-10-CM

## 2025-06-02 PROCEDURE — 2500000004 HC RX 250 GENERAL PHARMACY W/ HCPCS (ALT 636 FOR OP/ED): Mod: SE | Performed by: PHYSICIAN ASSISTANT

## 2025-06-02 PROCEDURE — 49083 ABD PARACENTESIS W/IMAGING: CPT

## 2025-06-02 PROCEDURE — P9047 ALBUMIN (HUMAN), 25%, 50ML: HCPCS | Mod: SE | Performed by: PHYSICIAN ASSISTANT

## 2025-06-02 RX ORDER — ALBUMIN HUMAN 250 G/1000ML
37.5 SOLUTION INTRAVENOUS ONCE
Status: COMPLETED | OUTPATIENT
Start: 2025-06-02 | End: 2025-06-02

## 2025-06-02 RX ADMIN — ALBUMIN HUMAN 37.5 G: 0.25 SOLUTION INTRAVENOUS at 10:48

## 2025-06-02 NOTE — PROGRESS NOTES
Social Work Note    Referral Source: [employer]  Meeting Location: In-Person  Person(s) Present: Patient  Identified Needs: Transportation  Impression and Plan: JONO informed by [facility] staff that pt is requesting a bus ticket home. Per chart review, pt receives weekly paracentesis at . Pt reports she has recently changed from treatment every 2 weeks to weekly. These treatments are ordered by her liver doctor and patient does not receive care at [facility]. JONO informed pt that we are unable to provide transportation assistance to non-[facility] patients. LISW provided 1 ticket today and pt understands they will no longer be able to receive bus tickets from [facility]. Pt is connected with her [employer] transportation who provides her bus tickets. Also, her daughter helps bring her to appointments. If pt returns to [facility] requesting a bus ticket, please re-direct to utilizing her [employer] insurance or contacting family. Pt agreeable to plan.   Interventions Provided: Assessment and Referral to Community Resource  Estimated Time Spent: 20 min     SW will continue to follow as needed.

## 2025-06-02 NOTE — POST-PROCEDURE NOTE
INTERVENTIONAL RADIOLOGY ADVANCED PRACTICE PROCEDURE  [facility]    A time out was performed and Left Hemiabdomen was examined with US and appropriate entry point was confirmed and marked.   The patient was prepped and draped in a sterile manner, 1% lidocaine was used to anesthesize the skin and subcutaneous tissue.   A 5F Centesis needle was then introduced through the skin into the peritoneal space, the centesis catheter was then threaded without difficulty.   6000 ml of yellow fluid was removed without difficulty. The catheter was then removed.   No immediate complications were noted during and immediately following the procedure.

## 2025-06-05 ENCOUNTER — CLINICAL SUPPORT (OUTPATIENT)
Facility: HOSPITAL | Age: 65
End: 2025-06-05
Payer: COMMERCIAL

## 2025-06-05 NOTE — PROGRESS NOTES
[employer] [facility] [employer]  Diet Recommendation 1: Healthy Eating  Diet Recommendation 2: Protein, High  Other Diet Recommendations: Ascites  Food Intolerance Avoidance: NKFA  Household Size: 3 Family Members  Interventions: Referral Number: 5th 6 Mo Referral 2.5 yrs (Referrals may not be consecutive)  Interventions: Visit Number: 1 of 6 Visits - Max 6 Visits/Referral Each 6 Mo Period  Education Today: [employer]  Follow Up Notes for Future Visits: Gets food her grandchildren will like  Grains: 50-75% Whole  Fruit: % Fresh  Vegetables: 0-25% Fresh  Proteins: 0 Plant-based Items  Dairy: 50-75% Lowfat  Relevant [facility] [employer] Inpatient Discharge Items: Needs new referral- pt going to call MD today  Originating Site of Referral Order: KEERTHI Bustos  Initials of RD Assisting Today: SHARRI

## 2025-06-09 ENCOUNTER — HOSPITAL ENCOUNTER (OUTPATIENT)
Dept: RADIOLOGY | Facility: HOSPITAL | Age: 65
Discharge: HOME | End: 2025-06-09
Payer: COMMERCIAL

## 2025-06-09 VITALS — HEART RATE: 95 BPM | SYSTOLIC BLOOD PRESSURE: 115 MMHG | OXYGEN SATURATION: 97 % | DIASTOLIC BLOOD PRESSURE: 76 MMHG

## 2025-06-09 DIAGNOSIS — K70.31 ALCOHOLIC CIRRHOSIS OF LIVER WITH ASCITES (MULTI): ICD-10-CM

## 2025-06-09 PROCEDURE — 49083 ABD PARACENTESIS W/IMAGING: CPT

## 2025-06-09 NOTE — POST-PROCEDURE NOTE
INTERVENTIONAL RADIOLOGY ADVANCED PRACTICE PROCEDURE  [facility]    A time out was performed and Left Hemiabdomen was examined with US and appropriate entry point was confirmed and marked.   The patient was prepped and draped in a sterile manner, 1% lidocaine was used to anesthesize the skin and subcutaneous tissue.   A 5F Centesis needle was then introduced through the skin into the peritoneal space, the centesis catheter was then threaded without difficulty.   5000 ml of yellow fluid was removed without difficulty. The catheter was then removed.   No immediate complications were noted during and immediately following the procedure.

## 2025-06-11 ENCOUNTER — APPOINTMENT (OUTPATIENT)
Facility: HOSPITAL | Age: 65
End: 2025-06-11
Payer: COMMERCIAL

## 2025-06-16 ENCOUNTER — HOSPITAL ENCOUNTER (OUTPATIENT)
Dept: RADIOLOGY | Facility: HOSPITAL | Age: 65
Discharge: HOME | End: 2025-06-16
Payer: COMMERCIAL

## 2025-06-16 VITALS — HEART RATE: 115 BPM | DIASTOLIC BLOOD PRESSURE: 74 MMHG | SYSTOLIC BLOOD PRESSURE: 122 MMHG | OXYGEN SATURATION: 98 %

## 2025-06-16 DIAGNOSIS — K70.31 ALCOHOLIC CIRRHOSIS OF LIVER WITH ASCITES (MULTI): ICD-10-CM

## 2025-06-16 PROCEDURE — 49083 ABD PARACENTESIS W/IMAGING: CPT

## 2025-06-16 NOTE — POST-PROCEDURE NOTE
INTERVENTIONAL RADIOLOGY ADVANCED PRACTICE PROCEDURE  [facility]    A time out was performed and Left Hemiabdomen was examined with US and appropriate entry point was confirmed and marked.   The patient was prepped and draped in a sterile manner, 1% lidocaine was used to anesthesize the skin and subcutaneous tissue.   A 5F Centesis needle was then introduced through the skin into the peritoneal space, the centesis catheter was then threaded without difficulty.   5000 ml of yellow fluid was removed without difficulty. The catheter was then removed.   No immediate complications were noted during and immediately following the procedure.

## 2025-06-23 ENCOUNTER — HOSPITAL ENCOUNTER (OUTPATIENT)
Dept: RADIOLOGY | Facility: HOSPITAL | Age: 65
Discharge: HOME | End: 2025-06-23
Payer: COMMERCIAL

## 2025-06-23 VITALS — HEART RATE: 99 BPM | DIASTOLIC BLOOD PRESSURE: 78 MMHG | OXYGEN SATURATION: 98 % | SYSTOLIC BLOOD PRESSURE: 124 MMHG

## 2025-06-23 DIAGNOSIS — K70.31 ALCOHOLIC CIRRHOSIS OF LIVER WITH ASCITES (MULTI): ICD-10-CM

## 2025-06-23 PROCEDURE — 49083 ABD PARACENTESIS W/IMAGING: CPT

## 2025-06-23 NOTE — POST-PROCEDURE NOTE
INTERVENTIONAL RADIOLOGY ADVANCED PRACTICE PROCEDURE  [facility]    A time out was performed and Left Hemiabdomen was examined with US and appropriate entry point was confirmed and marked.   The patient was prepped and draped in a sterile manner, 1% lidocaine was used to anesthesize the skin and subcutaneous tissue.   A 5F Centesis needle was then introduced through the skin into the peritoneal space, the centesis catheter was then threaded without difficulty.   5000 ml of yellow fluid was removed without difficulty. The catheter was then removed.   No immediate complications were noted during and immediately following the procedure.

## 2025-06-30 ENCOUNTER — HOSPITAL ENCOUNTER (OUTPATIENT)
Dept: RADIOLOGY | Facility: HOSPITAL | Age: 65
Discharge: HOME | End: 2025-06-30
Payer: COMMERCIAL

## 2025-06-30 VITALS — OXYGEN SATURATION: 100 % | DIASTOLIC BLOOD PRESSURE: 73 MMHG | HEART RATE: 88 BPM | SYSTOLIC BLOOD PRESSURE: 101 MMHG

## 2025-06-30 DIAGNOSIS — K70.31 ALCOHOLIC CIRRHOSIS OF LIVER WITH ASCITES (MULTI): ICD-10-CM

## 2025-06-30 PROCEDURE — 49083 ABD PARACENTESIS W/IMAGING: CPT | Performed by: PHYSICIAN ASSISTANT

## 2025-06-30 PROCEDURE — 49083 ABD PARACENTESIS W/IMAGING: CPT

## 2025-06-30 PROCEDURE — P9047 ALBUMIN (HUMAN), 25%, 50ML: HCPCS | Mod: SE | Performed by: PHYSICIAN ASSISTANT

## 2025-06-30 PROCEDURE — 2500000004 HC RX 250 GENERAL PHARMACY W/ HCPCS (ALT 636 FOR OP/ED): Mod: SE | Performed by: PHYSICIAN ASSISTANT

## 2025-06-30 RX ORDER — ALBUMIN HUMAN 250 G/1000ML
25 SOLUTION INTRAVENOUS ONCE
Status: COMPLETED | OUTPATIENT
Start: 2025-06-30 | End: 2025-06-30

## 2025-06-30 RX ADMIN — ALBUMIN HUMAN 25 G: 0.25 SOLUTION INTRAVENOUS at 10:46

## 2025-06-30 NOTE — POST-PROCEDURE NOTE
INTERVENTIONAL RADIOLOGY ADVANCED PRACTICE PROCEDURE  [facility]    A time out was performed and Left Hemiabdomen was examined with US and appropriate entry point was confirmed and marked.   The patient was prepped and draped in a sterile manner, 1% lidocaine was used to anesthesize the skin and subcutaneous tissue.   A 5F Centesis needle was then introduced through the skin into the peritoneal space, the centesis catheter was then threaded without difficulty.   6000 ml of yellow fluid was removed without difficulty. The catheter was then removed.   No immediate complications were noted during and immediately following the procedure.

## 2025-07-02 ENCOUNTER — APPOINTMENT (OUTPATIENT)
Facility: HOSPITAL | Age: 65
End: 2025-07-02
Payer: COMMERCIAL

## 2025-07-07 ENCOUNTER — HOSPITAL ENCOUNTER (OUTPATIENT)
Dept: RADIOLOGY | Facility: HOSPITAL | Age: 65
Discharge: HOME | End: 2025-07-07
Payer: COMMERCIAL

## 2025-07-07 ENCOUNTER — CLINICAL SUPPORT (OUTPATIENT)
Facility: HOSPITAL | Age: 65
End: 2025-07-07
Payer: COMMERCIAL

## 2025-07-07 VITALS — SYSTOLIC BLOOD PRESSURE: 129 MMHG | DIASTOLIC BLOOD PRESSURE: 80 MMHG | OXYGEN SATURATION: 98 % | HEART RATE: 104 BPM

## 2025-07-07 DIAGNOSIS — Z59.41 FOOD INSECURITY: Primary | ICD-10-CM

## 2025-07-07 DIAGNOSIS — K70.31 ALCOHOLIC CIRRHOSIS OF LIVER WITH ASCITES (MULTI): ICD-10-CM

## 2025-07-07 PROCEDURE — P9047 ALBUMIN (HUMAN), 25%, 50ML: HCPCS | Mod: SE | Performed by: PHYSICIAN ASSISTANT

## 2025-07-07 PROCEDURE — 2500000004 HC RX 250 GENERAL PHARMACY W/ HCPCS (ALT 636 FOR OP/ED): Mod: SE | Performed by: PHYSICIAN ASSISTANT

## 2025-07-07 PROCEDURE — 49083 ABD PARACENTESIS W/IMAGING: CPT

## 2025-07-07 PROCEDURE — 49083 ABD PARACENTESIS W/IMAGING: CPT | Performed by: PHYSICIAN ASSISTANT

## 2025-07-07 RX ORDER — ALBUMIN HUMAN 250 G/1000ML
37.5 SOLUTION INTRAVENOUS ONCE
Status: COMPLETED | OUTPATIENT
Start: 2025-07-07 | End: 2025-07-07

## 2025-07-07 RX ADMIN — ALBUMIN HUMAN 37.5 G: 0.25 SOLUTION INTRAVENOUS at 10:41

## 2025-07-07 SDOH — ECONOMIC STABILITY - FOOD INSECURITY: FOOD INSECURITY: Z59.41

## 2025-07-07 NOTE — PROGRESS NOTES
[employer] [facility] [employer]  Diet Recommendation 1: Healthy Eating  Diet Recommendation 2: Protein, High  Other Diet Recommendations: Ascites  Food Intolerance Avoidance: NKFA  Household Size: 3 Family Members  Interventions: Referral Number: 5th 6 Mo Referral 2.5 yrs (Referrals may not be consecutive)  Interventions: Visit Number: 2 of 6 Visits - Max 6 Visits/Referral Each 6 Mo Period  Education Today: [employer]  Follow Up Notes for Future Visits: Gets food her grandchildren will like  Grains: 0-25% Whole  Fruit: 50-75% Fresh  Vegetables: 25-50% Fresh  Proteins: 1-2 Plant-based Items  Dairy: 25-50% Lowfat  Relevant [facility] [employer] Inpatient Discharge Items: Messaged Provider for new referral  Originating Site of Referral Order: Dr. Kendra Gonzalez  Initials of RD Assisting Today: NINA

## 2025-07-07 NOTE — POST-PROCEDURE NOTE
INTERVENTIONAL RADIOLOGY ADVANCED PRACTICE PROCEDURE  [facility]    A time out was performed and Left Hemiabdomen was examined with US and appropriate entry point was confirmed and marked.   The patient was prepped and draped in a sterile manner, 1% lidocaine was used to anesthesize the skin and subcutaneous tissue.   A 5F Centesis needle was then introduced through the skin into the peritoneal space, the centesis catheter was then threaded without difficulty.   6000 ml of yellow fluid was removed without difficulty. The catheter was then removed.   No immediate complications were noted during and immediately following the procedure.

## 2025-07-14 ENCOUNTER — HOSPITAL ENCOUNTER (OUTPATIENT)
Dept: RADIOLOGY | Facility: HOSPITAL | Age: 65
Discharge: HOME | End: 2025-07-14
Payer: COMMERCIAL

## 2025-07-14 VITALS — HEART RATE: 90 BPM | OXYGEN SATURATION: 100 % | DIASTOLIC BLOOD PRESSURE: 70 MMHG | SYSTOLIC BLOOD PRESSURE: 109 MMHG

## 2025-07-14 DIAGNOSIS — K70.31 ALCOHOLIC CIRRHOSIS OF LIVER WITH ASCITES (MULTI): ICD-10-CM

## 2025-07-14 PROCEDURE — 49083 ABD PARACENTESIS W/IMAGING: CPT

## 2025-07-14 PROCEDURE — P9047 ALBUMIN (HUMAN), 25%, 50ML: HCPCS | Mod: SE | Performed by: PHYSICIAN ASSISTANT

## 2025-07-14 PROCEDURE — 2500000004 HC RX 250 GENERAL PHARMACY W/ HCPCS (ALT 636 FOR OP/ED): Mod: SE | Performed by: PHYSICIAN ASSISTANT

## 2025-07-14 RX ORDER — ALBUMIN HUMAN 250 G/1000ML
37.5 SOLUTION INTRAVENOUS ONCE
Status: COMPLETED | OUTPATIENT
Start: 2025-07-14 | End: 2025-07-14

## 2025-07-14 RX ADMIN — ALBUMIN HUMAN 37.5 G: 0.25 SOLUTION INTRAVENOUS at 10:40

## 2025-07-14 NOTE — POST-PROCEDURE NOTE
INTERVENTIONAL RADIOLOGY ADVANCED PRACTICE PROCEDURE  [facility]    A time out was performed and Left Hemiabdomen was examined with US and appropriate entry point was confirmed and marked.   The patient was prepped and draped in a sterile manner, 1% lidocaine was used to anesthesize the skin and subcutaneous tissue.   A 5F Centesis needle was then introduced through the skin into the peritoneal space, the centesis catheter was then threaded without difficulty.   5800 ml of yellow fluid was removed without difficulty. The catheter was then removed.   No immediate complications were noted during and immediately following the procedure.

## 2025-07-21 ENCOUNTER — HOSPITAL ENCOUNTER (OUTPATIENT)
Dept: RADIOLOGY | Facility: HOSPITAL | Age: 65
Discharge: HOME | End: 2025-07-21
Payer: COMMERCIAL

## 2025-07-21 VITALS — DIASTOLIC BLOOD PRESSURE: 75 MMHG | SYSTOLIC BLOOD PRESSURE: 113 MMHG | OXYGEN SATURATION: 100 % | HEART RATE: 88 BPM

## 2025-07-21 DIAGNOSIS — K70.31 ALCOHOLIC CIRRHOSIS OF LIVER WITH ASCITES (MULTI): ICD-10-CM

## 2025-07-21 PROCEDURE — 49083 ABD PARACENTESIS W/IMAGING: CPT | Performed by: PHYSICIAN ASSISTANT

## 2025-07-21 PROCEDURE — 2500000004 HC RX 250 GENERAL PHARMACY W/ HCPCS (ALT 636 FOR OP/ED): Mod: SE | Performed by: PHYSICIAN ASSISTANT

## 2025-07-21 PROCEDURE — P9047 ALBUMIN (HUMAN), 25%, 50ML: HCPCS | Mod: SE | Performed by: PHYSICIAN ASSISTANT

## 2025-07-21 PROCEDURE — 49083 ABD PARACENTESIS W/IMAGING: CPT

## 2025-07-21 RX ORDER — ALBUMIN HUMAN 250 G/1000ML
50 SOLUTION INTRAVENOUS ONCE
Status: COMPLETED | OUTPATIENT
Start: 2025-07-21 | End: 2025-07-21

## 2025-07-21 RX ADMIN — ALBUMIN HUMAN 50 G: 0.25 SOLUTION INTRAVENOUS at 10:09

## 2025-07-21 NOTE — POST-PROCEDURE NOTE
INTERVENTIONAL RADIOLOGY ADVANCED PRACTICE PROCEDURE  [facility]    A time out was performed and Left Hemiabdomen was examined with US and appropriate entry point was confirmed and marked.   The patient was prepped and draped in a sterile manner, 1% lidocaine was used to anesthesize the skin and subcutaneous tissue.   A 5F Centesis needle was then introduced through the skin into the peritoneal space, the centesis catheter was then threaded without difficulty.   6000 ml of olga fluid was removed without difficulty. The catheter was then removed.   No immediate complications were noted during and immediately following the procedure.

## 2025-07-28 ENCOUNTER — HOSPITAL ENCOUNTER (OUTPATIENT)
Dept: RADIOLOGY | Facility: HOSPITAL | Age: 65
Discharge: HOME | End: 2025-07-28
Payer: COMMERCIAL

## 2025-07-28 VITALS — DIASTOLIC BLOOD PRESSURE: 69 MMHG | HEART RATE: 86 BPM | OXYGEN SATURATION: 98 % | SYSTOLIC BLOOD PRESSURE: 105 MMHG

## 2025-07-28 DIAGNOSIS — K70.31 ALCOHOLIC CIRRHOSIS OF LIVER WITH ASCITES (MULTI): ICD-10-CM

## 2025-07-28 PROCEDURE — 2500000004 HC RX 250 GENERAL PHARMACY W/ HCPCS (ALT 636 FOR OP/ED): Mod: SE | Performed by: PHYSICIAN ASSISTANT

## 2025-07-28 PROCEDURE — P9047 ALBUMIN (HUMAN), 25%, 50ML: HCPCS | Mod: SE | Performed by: PHYSICIAN ASSISTANT

## 2025-07-28 PROCEDURE — 49083 ABD PARACENTESIS W/IMAGING: CPT

## 2025-07-28 PROCEDURE — 49083 ABD PARACENTESIS W/IMAGING: CPT | Performed by: PHYSICIAN ASSISTANT

## 2025-07-28 RX ORDER — ALBUMIN HUMAN 250 G/1000ML
50 SOLUTION INTRAVENOUS ONCE
Status: COMPLETED | OUTPATIENT
Start: 2025-07-28 | End: 2025-07-28

## 2025-07-28 RX ADMIN — ALBUMIN HUMAN 50 G: 0.25 SOLUTION INTRAVENOUS at 10:27

## 2025-07-28 NOTE — POST-PROCEDURE NOTE
INTERVENTIONAL RADIOLOGY ADVANCED PRACTICE PROCEDURE  [facility]    A time out was performed and Left Hemiabdomen was examined with US and appropriate entry point was confirmed and marked.   The patient was prepped and draped in a sterile manner, 1% lidocaine was used to anesthesize the skin and subcutaneous tissue.   A 5F Centesis needle was then introduced through the skin into the peritoneal space, the centesis catheter was then threaded without difficulty.   5000 ml of olga fluid was removed without difficulty. The catheter was then removed.   No immediate complications were noted during and immediately following the procedure.

## 2025-08-04 ENCOUNTER — HOSPITAL ENCOUNTER (OUTPATIENT)
Dept: RADIOLOGY | Facility: HOSPITAL | Age: 65
Discharge: HOME | End: 2025-08-04
Payer: COMMERCIAL

## 2025-08-04 VITALS — SYSTOLIC BLOOD PRESSURE: 115 MMHG | HEART RATE: 99 BPM | DIASTOLIC BLOOD PRESSURE: 76 MMHG | OXYGEN SATURATION: 99 %

## 2025-08-04 DIAGNOSIS — K70.31 ALCOHOLIC CIRRHOSIS OF LIVER WITH ASCITES (MULTI): ICD-10-CM

## 2025-08-04 PROCEDURE — 2500000004 HC RX 250 GENERAL PHARMACY W/ HCPCS (ALT 636 FOR OP/ED): Mod: SE | Performed by: PHYSICIAN ASSISTANT

## 2025-08-04 PROCEDURE — 49083 ABD PARACENTESIS W/IMAGING: CPT

## 2025-08-04 PROCEDURE — P9047 ALBUMIN (HUMAN), 25%, 50ML: HCPCS | Mod: SE | Performed by: PHYSICIAN ASSISTANT

## 2025-08-04 PROCEDURE — 49083 ABD PARACENTESIS W/IMAGING: CPT | Performed by: PHYSICIAN ASSISTANT

## 2025-08-04 RX ORDER — ALBUMIN HUMAN 250 G/1000ML
50 SOLUTION INTRAVENOUS ONCE
Status: COMPLETED | OUTPATIENT
Start: 2025-08-04 | End: 2025-08-04

## 2025-08-04 RX ADMIN — ALBUMIN HUMAN 50 G: 0.25 SOLUTION INTRAVENOUS at 10:45

## 2025-08-04 NOTE — POST-PROCEDURE NOTE
INTERVENTIONAL RADIOLOGY ADVANCED PRACTICE PROCEDURE  [facility]    A time out was performed and Left Hemiabdomen was examined with US and appropriate entry point was confirmed and marked.   The patient was prepped and draped in a sterile manner, 1% lidocaine was used to anesthesize the skin and subcutaneous tissue.   A 5F Centesis needle was then introduced through the skin into the peritoneal space, the centesis catheter was then threaded without difficulty.   5100 ml of yellow fluid was removed without difficulty. The catheter was then removed.   No immediate complications were noted during and immediately following the procedure.

## 2025-08-05 ENCOUNTER — OFFICE VISIT (OUTPATIENT)
Dept: PRIMARY CARE | Facility: HOSPITAL | Age: 65
End: 2025-08-05
Payer: COMMERCIAL

## 2025-08-05 ENCOUNTER — PHARMACY VISIT (OUTPATIENT)
Dept: PHARMACY | Facility: CLINIC | Age: 65
End: 2025-08-05
Payer: MEDICAID

## 2025-08-05 VITALS
TEMPERATURE: 98.1 F | SYSTOLIC BLOOD PRESSURE: 105 MMHG | OXYGEN SATURATION: 100 % | HEIGHT: 66 IN | BODY MASS INDEX: 23.4 KG/M2 | DIASTOLIC BLOOD PRESSURE: 70 MMHG | HEART RATE: 82 BPM

## 2025-08-05 DIAGNOSIS — K55.069 SUPERIOR MESENTERIC VEIN THROMBOSIS (MULTI): ICD-10-CM

## 2025-08-05 DIAGNOSIS — Z12.39 BREAST SCREENING: Primary | ICD-10-CM

## 2025-08-05 DIAGNOSIS — I85.00 ESOPHAGEAL VARICES WITHOUT BLEEDING, UNSPECIFIED ESOPHAGEAL VARICES TYPE (MULTI): ICD-10-CM

## 2025-08-05 DIAGNOSIS — K76.6 PORTAL HYPERTENSION (MULTI): ICD-10-CM

## 2025-08-05 DIAGNOSIS — E78.5 DYSLIPIDEMIA: ICD-10-CM

## 2025-08-05 DIAGNOSIS — F31.81: ICD-10-CM

## 2025-08-05 DIAGNOSIS — F10.251: ICD-10-CM

## 2025-08-05 DIAGNOSIS — I85.10 SECONDARY ESOPHAGEAL VARICES WITHOUT BLEEDING (MULTI): ICD-10-CM

## 2025-08-05 DIAGNOSIS — K70.31 ALCOHOLIC CIRRHOSIS OF LIVER WITH ASCITES (MULTI): ICD-10-CM

## 2025-08-05 DIAGNOSIS — L30.9 ECZEMA, UNSPECIFIED TYPE: ICD-10-CM

## 2025-08-05 PROCEDURE — 99212 OFFICE O/P EST SF 10 MIN: CPT

## 2025-08-05 PROCEDURE — 1036F TOBACCO NON-USER: CPT

## 2025-08-05 PROCEDURE — 3078F DIAST BP <80 MM HG: CPT

## 2025-08-05 PROCEDURE — 3074F SYST BP LT 130 MM HG: CPT

## 2025-08-05 PROCEDURE — 99214 OFFICE O/P EST MOD 30 MIN: CPT

## 2025-08-05 PROCEDURE — RXMED WILLOW AMBULATORY MEDICATION CHARGE

## 2025-08-05 RX ORDER — ARIPIPRAZOLE 5 MG/1
5 TABLET ORAL DAILY
Qty: 30 TABLET | Refills: 5 | Status: SHIPPED | OUTPATIENT
Start: 2025-08-05 | End: 2026-08-05

## 2025-08-05 RX ORDER — FUROSEMIDE 40 MG/1
40 TABLET ORAL DAILY
Qty: 30 TABLET | Refills: 5 | Status: SHIPPED | OUTPATIENT
Start: 2025-08-05 | End: 2026-02-01

## 2025-08-05 RX ORDER — ATORVASTATIN CALCIUM 20 MG/1
20 TABLET, FILM COATED ORAL NIGHTLY
Qty: 30 TABLET | Refills: 11 | Status: SHIPPED | OUTPATIENT
Start: 2025-08-05 | End: 2026-08-05

## 2025-08-05 RX ORDER — SPIRONOLACTONE 50 MG/1
50 TABLET, FILM COATED ORAL DAILY
Qty: 30 TABLET | Refills: 5 | Status: SHIPPED | OUTPATIENT
Start: 2025-08-05 | End: 2026-02-01

## 2025-08-05 RX ORDER — CARVEDILOL 3.12 MG/1
3.12 TABLET ORAL
Qty: 30 TABLET | Refills: 11 | Status: SHIPPED | OUTPATIENT
Start: 2025-08-05 | End: 2026-08-05

## 2025-08-05 RX ORDER — HYDROCORTISONE 25 MG/G
CREAM TOPICAL 2 TIMES DAILY PRN
Qty: 30 G | Refills: 4 | Status: SHIPPED | OUTPATIENT
Start: 2025-08-05 | End: 2026-08-05

## 2025-08-05 RX ORDER — LANOLIN ALCOHOL/MO/W.PET/CERES
100 CREAM (GRAM) TOPICAL DAILY
Qty: 30 TABLET | Refills: 11 | Status: SHIPPED | OUTPATIENT
Start: 2025-08-05 | End: 2026-08-05

## 2025-08-05 ASSESSMENT — PATIENT HEALTH QUESTIONNAIRE - PHQ9
1. LITTLE INTEREST OR PLEASURE IN DOING THINGS: NOT AT ALL
2. FEELING DOWN, DEPRESSED OR HOPELESS: NOT AT ALL
SUM OF ALL RESPONSES TO PHQ9 QUESTIONS 1 AND 2: 0

## 2025-08-05 ASSESSMENT — PAIN SCALES - GENERAL: PAINLEVEL_OUTOF10: 0-NO PAIN

## 2025-08-05 ASSESSMENT — ENCOUNTER SYMPTOMS
DEPRESSION: 0
OCCASIONAL FEELINGS OF UNSTEADINESS: 0
LOSS OF SENSATION IN FEET: 0

## 2025-08-05 NOTE — PROGRESS NOTES
"Chief complaint: follow-up    HPI:  Yolie Moreno is a 65 y.o. female with pmh decompensated Alcoholic Cirrhosis 2/2 EtOH use disorder c/b large EV and recurrent ascites, bipolar with psychotic features, SMV thrombosis who is presenting for follow-up appointment, last seen in [facility] [place]2024.    Patient is very agitated today due to her being in the waiting room for an hour after showing up an hour early for her appointment.  When I entered the room, patient said she did not want to talk about anything medical and just wanted refills on her medication so she can \"get out of here\".  She was not interested in discussing her medical conditions any further.  Upon further questioning, unfortunately it sounds like patient has continued to drink alcohol.  She states that \"nothing will get me to stop drinking alcohol \" and the naltrexone did not help and she would like to stop it.  She also states that she has not had any of her medications for some time as they have all ran out.  She continues to present to outpatient IR every 2 weeks for large-volume paracentesis with her last being yesterday where 5.1 L were removed.  She is not interested in following up with hepatology or having an EGD or colonoscopy for screening despite my recommendation that she do so given her prior findings of esophageal varices and her high risk liver disease.    Medications:  Current Outpatient Medications   Medication Instructions    ARIPiprazole (ABILIFY) 5 mg, oral, Daily    atorvastatin (LIPITOR) 20 mg, oral, Nightly    carvedilol (Coreg) 3.125 mg tablet TAKE 1 TABLET BY MOUTH TWO TIMES A DAY WITH MEALS    furosemide (LASIX) 40 mg, oral, Daily    hydrocortisone 2.5 % cream Topical, 2 times daily PRN    naltrexone (DEPADE) 50 mg, oral, Daily    spironolactone (ALDACTONE) 50 mg, oral, Daily    thiamine (VITAMIN B-1) 100 mg, oral, Daily       Allergies:  RX Allergies[1]    Past medical history:  Medical History[2]    Surgical history:  Surgical " History[3]    Family history:  Family History[4]    Social history:   reports that she has never smoked. She has never used smokeless tobacco. She reports current alcohol use. She reports that she does not currently use drugs.    Health maintenance:  Health Maintenance   Topic Date Due    MMR Vaccines (1 of 1 - Standard series) Never done    Hepatitis A Vaccines (1 of 2 - Risk 2-dose series) Never done    DTaP/Tdap/Td Vaccines (1 - Tdap) Never done    Zoster Vaccines (1 of 2) Never done    Yearly Adult Physical  07/07/2016    RSV High Risk: (Elderly (60+) or Pregnant Population) (1 - Risk 60-74 years 1-dose series) Never done    Hepatitis B Vaccines (1 of 3 - Risk 3-dose series) Never done    Mammogram  06/13/2024    COVID-19 Vaccine (1 - 2024-25 season) Never done    Influenza Vaccine (1) 09/01/2025    Diabetes Screening  01/30/2026    Cervical Cancer Screening  07/21/2026    Lipid Panel  04/22/2029    Colorectal Cancer Screening  08/12/2032    Pneumococcal Vaccine  Completed    HIV Screening  Completed    HIB Vaccines  Aged Out    IPV Vaccines  Aged Out    Meningococcal Vaccine  Aged Out    Rotavirus Vaccines  Aged Out    HPV Vaccines  Aged Out    Irritable Bowel Syndrome  Discontinued     Review of systems:  Unable to discuss due to patient frustration    Vitals:  Vitals:    08/05/25 1323   BP: 105/70   Pulse: 82   Temp: 36.7 °C (98.1 °F)   SpO2: 100%     Physical exam:  Physical Exam  Constitutional:       Comments: Agitated, but fluctuating mood (between agitation and calm composure).     Eyes:      Comments: BL conjunctival injection     Cardiovascular:      Rate and Rhythm: Normal rate and regular rhythm.   Pulmonary:      Effort: Pulmonary effort is normal.      Breath sounds: Normal breath sounds.   Abdominal:      General: There is distension.      Tenderness: There is no abdominal tenderness. There is no guarding or rebound.     Musculoskeletal:      Right lower leg: Edema present.      Left lower leg:  "Edema present.      Comments: BL 1-2+ pitting edema     Psychiatric:      Comments: Agitated, but fluctuating mood (between agitation and calm composure).         Labs:  Lab Results   Component Value Date    WBC 6.3 01/30/2025    HGB 11.5 (L) 01/30/2025    HCT 34.1 (L) 01/30/2025    MCV 83 01/30/2025     (L) 01/30/2025       Lab Results   Component Value Date    GLUCOSE 97 01/30/2025    CALCIUM 8.6 01/30/2025     01/30/2025    K 4.3 01/30/2025    CO2 23 01/30/2025     01/30/2025    BUN 9 01/30/2025    CREATININE 0.79 01/30/2025       Lab Results   Component Value Date    HGBA1C 4.8 04/22/2024        Lab Results   Component Value Date    CHOL 167 04/22/2024    CHOL 202 (H) 06/07/2023    CHOL 234 (H) 05/09/2023     Lab Results   Component Value Date    HDL 72.9 04/22/2024    HDL 38.9 (A) 06/07/2023    HDL 48.2 05/09/2023     Lab Results   Component Value Date    LDLCALC 68 04/22/2024     Lab Results   Component Value Date    TRIG 131 04/22/2024    TRIG 148 06/07/2023    TRIG 122 05/09/2023     No components found for: \"CHOLHDL\"    Imaging:  Imaging  US guided abdominal paracentesis  Result Date: 8/4/2025  Uneventful ultrasound guided paracentesis, as detailed above.   I was present for and/or performed the critical portions of the procedure and immediately available throughout the entire procedure.   I personally reviewed the image(s) / study and interpretation. I agree with the findings as stated.   Performed and dictated at [facility].   MACRO: None   Signed by: Ale Dasilva 8/4/2025 2:21 PM Dictation workstation:   [id number]      Cardiology, Vascular, and Other Imaging  No other imaging results found for the past 7 days      Assessment and plan:  Yolie Moreno is a 65 y.o. female with pmh decompensated Alcoholic Cirrhosis 2/2 EtOH use disorder c/b large EV and recurrent ascites, bipolar with psychotic features, SMV thrombosis who is presenting for " "follow-up appointment, last seen in [facility] [place].    Updates:  -Very agitated after waiting for appointment, labile mood  -Refilled medications  -Pt still drinking, not interested in continuing naltrexone or trying to quit alcohol at this time - I stopped the naltrexone  -Not interested in screening EGD or Cscope, I placed a referral to Hepatology where this can be re-discussed  -I placed a behavioral health referral for hx of bipolar, ETOH use disorder, and for continued management of psych meds    #Decompensated Alcoholic Cirrhosis w/ large esophageal varices  #Alcohol use disorder  ::Endoscopy-large esophageal varices, PHG  ::Hep C Ab positive 2022, Viral Load undectable  -Left AMA 2023 prior to planned repeat EGD w/ ligation  -Continues to follow-up w/ IR every 2 weeks for large volume paracentesis  Plan:  -Continue coreg to 3.125 BID  -Continue with furosemide 40mg and spironolactone 50mg   -Continue low sodium diet   -Encouraged alcohol sobriety, previously started naltrexone and placed alcohol dependence clinic referral - pt did not follow-up and stated that naltrexone is not helpful; \"nothing will help me quit\"  -Placed hepatology referral for follow-up     #Bipolar with psychotic features, Depression   -Symptoms well controlled w/ abilify, becomes agitated when aripiprazole runes out  Plan:  -Re-ordered aripiprazole 5 mg daily  -Placed psych referral -- informed pt that we do not manage aripiprazole in [facility] and that she would need to follow-up with Psych for further refills     #Mild Cognitive Impairment  ::19-20 on MOCA 2023  -Intoxicated today, continues to complain of poor memory  Plan:  -Encouraged alcohol cessation     # Sinus tachycardia  :: Patient asymptomatic at this time        # Health Maintenance  Mammogram:  negative, re-ordered mammogram  Pap smear/pelvic Exam: completed  , follow up in   Colon cancer screenin/22, repeat due in  - pt not interested at this time, " though her previous plan was for EGD and Cscope with hepatology; hepatology referral placed today.  HIV/STD screening: not done  Influenza: 2022  Shingrix: Did not want previously, will need to address at next visit  Pneumococcal Vaccine: not done (recommended)  COVID Vaccine: COVID primary and 2 boosters completed    Plan     Follow-up in 6 months.    Patient and plan discussed with attending physician Dr. Limon.    Smita Pink MD          [1] No Known Allergies  [2]   Past Medical History:  Diagnosis Date    Alcohol abuse     Bipolar II disorder (Multi)     Cirrhosis (Multi)     Cirrhosis of liver (Multi)     Cocaine abuse     Esophageal varices     Hepatitis C     Hydronephrosis     MDD (major depressive disorder)     Trichomonal vulvovaginitis 07/30/2022    Trichomonas vaginitis   [3]   Past Surgical History:  Procedure Laterality Date    CHOLECYSTECTOMY  11/12/2014    Cholecystectomy    US GUIDED ABDOMINAL PARACENTESIS  6/14/2023    US GUIDED ABDOMINAL PARACENTESIS 6/14/2023 [facility] [place]    US GUIDED ABDOMINAL PARACENTESIS  7/3/2023    US GUIDED ABDOMINAL PARACENTESIS 7/3/2023 [facility] [place]    US GUIDED ABDOMINAL PARACENTESIS  9/7/2023    US GUIDED ABDOMINAL PARACENTESIS 9/7/2023 [facility]    US GUIDED ABDOMINAL PARACENTESIS  9/28/2023    US GUIDED ABDOMINAL PARACENTESIS 9/28/2023 [facility]    US GUIDED ABDOMINAL PARACENTESIS  10/19/2023    US GUIDED ABDOMINAL PARACENTESIS 10/19/2023 [facility]    US GUIDED ABDOMINAL PARACENTESIS  11/9/2023    US GUIDED ABDOMINAL PARACENTESIS 11/9/2023 [facility]    US GUIDED ABDOMINAL PARACENTESIS  11/30/2023    US GUIDED ABDOMINAL PARACENTESIS 11/30/2023 [facility]    US GUIDED ABDOMINAL PARACENTESIS  12/21/2023    US GUIDED ABDOMINAL PARACENTESIS 12/21/2023 [facility]    US GUIDED ABDOMINAL PARACENTESIS  12/26/2023    US GUIDED ABDOMINAL PARACENTESIS 12/26/2023 [facility]   [4]   Family History  Problem Relation Name Age of Onset    Diabetes type  II Father

## 2025-08-05 NOTE — PATIENT INSTRUCTIONS
As discussed today, our plan is:     Medication changes: None, refills placed  Consultations - you were referred to see the following specialists: Hepatology, Behavioral Health/Psychiatry. You should receive a call from central scheduling in the next few days if you do not receive a call within 3-5 business days please call [phone] to schedule your appointment.   4.   If you smoke or use other tobacco products, take steps to quit. Call [phone] for more information or to set up an appointment with  [facility]. The [place] [facility] Quit Line is a free resource for people who don’t have insurance, receive Medicaid, pregnant women, or members of the [place] [facility]. Call 8-196-QUIT-NOW or 1-503.812.5633.    Please come back to see us in: 6 months   ------  If you have any problems or questions, please contact the clinic at [phone] to leave a message. Our fax number is [phone]. If your issue cannot wait until the next business day, please go to urgent care or the emergency department.     I also strongly urge all of my patients to register for [facility] by going to: [email or web address]  (The  staff can also send you a text/email link to register when you check out).    No shows: It is understandable if you are unable to make it to a visit, but please cancel your appointment instead of not showing up. This helps to give other patients access to primary care and keeps wait times low.        Rashad Pearson [facility]   [phone]
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
## 2025-08-11 ENCOUNTER — HOSPITAL ENCOUNTER (OUTPATIENT)
Dept: RADIOLOGY | Facility: HOSPITAL | Age: 65
Discharge: HOME | End: 2025-08-11
Payer: COMMERCIAL

## 2025-08-11 ENCOUNTER — APPOINTMENT (OUTPATIENT)
Dept: RADIOLOGY | Facility: HOSPITAL | Age: 65
End: 2025-08-11
Payer: COMMERCIAL

## 2025-08-11 ENCOUNTER — PREP FOR PROCEDURE (OUTPATIENT)
Dept: RADIOLOGY | Facility: HOSPITAL | Age: 65
End: 2025-08-11

## 2025-08-11 VITALS — DIASTOLIC BLOOD PRESSURE: 77 MMHG | HEART RATE: 78 BPM | OXYGEN SATURATION: 95 % | SYSTOLIC BLOOD PRESSURE: 121 MMHG

## 2025-08-11 DIAGNOSIS — K70.31 ASCITES DUE TO ALCOHOLIC CIRRHOSIS (MULTI): Primary | ICD-10-CM

## 2025-08-11 DIAGNOSIS — K70.31 ALCOHOLIC CIRRHOSIS OF LIVER WITH ASCITES (MULTI): Primary | ICD-10-CM

## 2025-08-11 DIAGNOSIS — K70.31 ASCITES DUE TO ALCOHOLIC CIRRHOSIS (MULTI): ICD-10-CM

## 2025-08-11 PROCEDURE — 49083 ABD PARACENTESIS W/IMAGING: CPT

## 2025-08-20 ENCOUNTER — HOSPITAL ENCOUNTER (OUTPATIENT)
Dept: RADIOLOGY | Facility: HOSPITAL | Age: 65
Discharge: HOME | End: 2025-08-20
Payer: COMMERCIAL

## 2025-08-20 VITALS — DIASTOLIC BLOOD PRESSURE: 78 MMHG | OXYGEN SATURATION: 99 % | SYSTOLIC BLOOD PRESSURE: 117 MMHG | HEART RATE: 94 BPM

## 2025-08-20 DIAGNOSIS — K70.31 ALCOHOLIC CIRRHOSIS OF LIVER WITH ASCITES (MULTI): ICD-10-CM

## 2025-08-20 PROCEDURE — 49083 ABD PARACENTESIS W/IMAGING: CPT

## 2025-08-25 ENCOUNTER — HOSPITAL ENCOUNTER (OUTPATIENT)
Dept: RADIOLOGY | Facility: HOSPITAL | Age: 65
Discharge: HOME | End: 2025-08-25
Payer: COMMERCIAL

## 2025-08-25 VITALS — OXYGEN SATURATION: 99 % | SYSTOLIC BLOOD PRESSURE: 110 MMHG | DIASTOLIC BLOOD PRESSURE: 73 MMHG | HEART RATE: 111 BPM

## 2025-08-25 DIAGNOSIS — K70.31 ALCOHOLIC CIRRHOSIS OF LIVER WITH ASCITES (MULTI): ICD-10-CM

## 2025-08-25 LAB
ALBUMIN SERPL BCP-MCNC: 2.5 G/DL (ref 3.4–5)
ALP SERPL-CCNC: 114 U/L (ref 33–136)
ALT SERPL W P-5'-P-CCNC: 25 U/L (ref 7–45)
ANION GAP SERPL CALC-SCNC: 9 MMOL/L (ref 10–20)
AST SERPL W P-5'-P-CCNC: 58 U/L (ref 9–39)
BASOPHILS # BLD AUTO: 0.07 X10*3/UL (ref 0–0.1)
BASOPHILS NFR BLD AUTO: 1.5 %
BILIRUB SERPL-MCNC: 1 MG/DL (ref 0–1.2)
BUN SERPL-MCNC: 14 MG/DL (ref 6–23)
CALCIUM SERPL-MCNC: 7.8 MG/DL (ref 8.6–10.6)
CHLORIDE SERPL-SCNC: 110 MMOL/L (ref 98–107)
CO2 SERPL-SCNC: 21 MMOL/L (ref 21–32)
CREAT SERPL-MCNC: 0.81 MG/DL (ref 0.5–1.05)
EGFRCR SERPLBLD CKD-EPI 2021: 82 ML/MIN/1.73M*2
EOSINOPHIL # BLD AUTO: 0.21 X10*3/UL (ref 0–0.7)
EOSINOPHIL NFR BLD AUTO: 4.4 %
ERYTHROCYTE [DISTWIDTH] IN BLOOD BY AUTOMATED COUNT: 16.2 % (ref 11.5–14.5)
GLUCOSE SERPL-MCNC: 94 MG/DL (ref 74–99)
HCT VFR BLD AUTO: 26.9 % (ref 36–46)
HGB BLD-MCNC: 9 G/DL (ref 12–16)
IMM GRANULOCYTES # BLD AUTO: 0.01 X10*3/UL (ref 0–0.7)
IMM GRANULOCYTES NFR BLD AUTO: 0.2 % (ref 0–0.9)
INR PPP: 1.2 (ref 0.9–1.1)
LYMPHOCYTES # BLD AUTO: 0.83 X10*3/UL (ref 1.2–4.8)
LYMPHOCYTES NFR BLD AUTO: 17.4 %
MCH RBC QN AUTO: 28.5 PG (ref 26–34)
MCHC RBC AUTO-ENTMCNC: 33.5 G/DL (ref 32–36)
MCV RBC AUTO: 85 FL (ref 80–100)
MONOCYTES # BLD AUTO: 1.29 X10*3/UL (ref 0.1–1)
MONOCYTES NFR BLD AUTO: 27 %
NEUTROPHILS # BLD AUTO: 2.36 X10*3/UL (ref 1.2–7.7)
NEUTROPHILS NFR BLD AUTO: 49.5 %
NRBC BLD-RTO: 0 /100 WBCS (ref 0–0)
PLATELET # BLD AUTO: 71 X10*3/UL (ref 150–450)
POTASSIUM SERPL-SCNC: 5.3 MMOL/L (ref 3.5–5.3)
PROT SERPL-MCNC: 5.9 G/DL (ref 6.4–8.2)
PROTHROMBIN TIME: 13.5 SECONDS (ref 9.8–12.4)
RBC # BLD AUTO: 3.16 X10*6/UL (ref 4–5.2)
SODIUM SERPL-SCNC: 135 MMOL/L (ref 136–145)
WBC # BLD AUTO: 4.8 X10*3/UL (ref 4.4–11.3)

## 2025-08-25 PROCEDURE — 49083 ABD PARACENTESIS W/IMAGING: CPT

## 2025-08-25 PROCEDURE — P9047 ALBUMIN (HUMAN), 25%, 50ML: HCPCS | Mod: SE | Performed by: NURSE PRACTITIONER

## 2025-08-25 PROCEDURE — 85610 PROTHROMBIN TIME: CPT | Performed by: NURSE PRACTITIONER

## 2025-08-25 PROCEDURE — 2500000004 HC RX 250 GENERAL PHARMACY W/ HCPCS (ALT 636 FOR OP/ED): Mod: SE | Performed by: NURSE PRACTITIONER

## 2025-08-25 PROCEDURE — 80053 COMPREHEN METABOLIC PANEL: CPT | Performed by: NURSE PRACTITIONER

## 2025-08-25 PROCEDURE — 36415 COLL VENOUS BLD VENIPUNCTURE: CPT | Performed by: NURSE PRACTITIONER

## 2025-08-25 PROCEDURE — 85025 COMPLETE CBC W/AUTO DIFF WBC: CPT | Performed by: NURSE PRACTITIONER

## 2025-08-25 RX ORDER — ALBUMIN HUMAN 250 G/1000ML
25 SOLUTION INTRAVENOUS ONCE
Status: COMPLETED | OUTPATIENT
Start: 2025-08-25 | End: 2025-08-25

## 2025-08-25 RX ADMIN — ALBUMIN HUMAN 25 G: 0.25 SOLUTION INTRAVENOUS at 10:45

## 2025-08-27 ENCOUNTER — PHARMACY VISIT (OUTPATIENT)
Dept: PHARMACY | Facility: CLINIC | Age: 65
End: 2025-08-27
Payer: MEDICAID

## 2025-08-27 ENCOUNTER — HOSPITAL ENCOUNTER (EMERGENCY)
Facility: HOSPITAL | Age: 65
Discharge: HOME | End: 2025-08-27
Payer: COMMERCIAL

## 2025-08-27 PROCEDURE — RXMED WILLOW AMBULATORY MEDICATION CHARGE

## 2025-08-27 ASSESSMENT — PAIN SCALES - GENERAL: PAINLEVEL_OUTOF10: 2

## 2025-08-27 ASSESSMENT — VISUAL ACUITY: OU: 1

## 2025-08-27 ASSESSMENT — PAIN DESCRIPTION - LOCATION: LOCATION: EYE

## 2025-08-27 ASSESSMENT — PAIN DESCRIPTION - PAIN TYPE: TYPE: ACUTE PAIN

## 2025-08-27 ASSESSMENT — PAIN DESCRIPTION - ORIENTATION: ORIENTATION: LEFT

## 2025-08-27 ASSESSMENT — PAIN - FUNCTIONAL ASSESSMENT: PAIN_FUNCTIONAL_ASSESSMENT: 0-10

## 2025-08-29 ENCOUNTER — PATIENT OUTREACH (OUTPATIENT)
Dept: CARE COORDINATION | Facility: CLINIC | Age: 65
End: 2025-08-29
Payer: COMMERCIAL

## 2025-09-01 ENCOUNTER — APPOINTMENT (OUTPATIENT)
Dept: RADIOLOGY | Facility: HOSPITAL | Age: 65
End: 2025-09-01
Payer: COMMERCIAL

## 2025-09-02 ENCOUNTER — APPOINTMENT (OUTPATIENT)
Dept: RADIOLOGY | Facility: HOSPITAL | Age: 65
End: 2025-09-02
Payer: COMMERCIAL

## 2025-09-03 ENCOUNTER — HOSPITAL ENCOUNTER (OUTPATIENT)
Dept: RADIOLOGY | Facility: HOSPITAL | Age: 65
Discharge: HOME | End: 2025-09-03
Payer: COMMERCIAL

## 2025-09-03 VITALS — SYSTOLIC BLOOD PRESSURE: 124 MMHG | DIASTOLIC BLOOD PRESSURE: 73 MMHG | OXYGEN SATURATION: 97 % | HEART RATE: 112 BPM

## 2025-09-03 DIAGNOSIS — K70.31 ALCOHOLIC CIRRHOSIS OF LIVER WITH ASCITES (MULTI): ICD-10-CM

## 2025-09-03 PROCEDURE — P9047 ALBUMIN (HUMAN), 25%, 50ML: HCPCS | Mod: SE | Performed by: NURSE PRACTITIONER

## 2025-09-03 PROCEDURE — 2500000004 HC RX 250 GENERAL PHARMACY W/ HCPCS (ALT 636 FOR OP/ED): Mod: SE | Performed by: NURSE PRACTITIONER

## 2025-09-03 PROCEDURE — 49083 ABD PARACENTESIS W/IMAGING: CPT

## 2025-09-03 RX ORDER — ALBUMIN HUMAN 250 G/1000ML
25 SOLUTION INTRAVENOUS ONCE
Status: COMPLETED | OUTPATIENT
Start: 2025-09-03 | End: 2025-09-03

## 2025-09-03 RX ADMIN — ALBUMIN HUMAN 25 G: 0.25 SOLUTION INTRAVENOUS at 14:07
